# Patient Record
Sex: FEMALE | Race: WHITE | Employment: FULL TIME | URBAN - METROPOLITAN AREA
[De-identification: names, ages, dates, MRNs, and addresses within clinical notes are randomized per-mention and may not be internally consistent; named-entity substitution may affect disease eponyms.]

---

## 2017-03-09 ENCOUNTER — OFFICE VISIT (OUTPATIENT)
Dept: FAMILY MEDICINE | Facility: CLINIC | Age: 62
End: 2017-03-09
Payer: COMMERCIAL

## 2017-03-09 VITALS
WEIGHT: 169.31 LBS | OXYGEN SATURATION: 97 % | SYSTOLIC BLOOD PRESSURE: 146 MMHG | HEART RATE: 97 BPM | DIASTOLIC BLOOD PRESSURE: 90 MMHG | TEMPERATURE: 97.5 F | HEIGHT: 64 IN | BODY MASS INDEX: 28.91 KG/M2

## 2017-03-09 DIAGNOSIS — B86 SCABIES: Primary | ICD-10-CM

## 2017-03-09 PROCEDURE — 99213 OFFICE O/P EST LOW 20 MIN: CPT | Performed by: PHYSICIAN ASSISTANT

## 2017-03-09 RX ORDER — TRIAMCINOLONE ACETONIDE 1 MG/G
CREAM TOPICAL
Qty: 80 G | Refills: 0 | Status: SHIPPED | OUTPATIENT
Start: 2017-03-09 | End: 2017-05-09

## 2017-03-09 RX ORDER — PERMETHRIN 50 MG/G
CREAM TOPICAL
Qty: 60 G | Refills: 1 | Status: SHIPPED | OUTPATIENT
Start: 2017-03-09 | End: 2017-05-09

## 2017-03-09 NOTE — MR AVS SNAPSHOT
After Visit Summary   3/9/2017    Jacqueline Banegas    MRN: [id number]           Patient Information     Date Of Birth          1955        Visit Information        Provider Department      3/9/2017 11:40 AM Klaudia Chow PA-C [facility]        Today's Diagnoses     Screen for colon cancer    -  1    Scabies          Care Instructions    Permethrin cream apply chin to toes and leave on overnight  Wash off in the morning  Reapply in 1 week  Wash all clothing and bedding that was used during infection  Continue allegra and Benadryl for hives  Scabies  Scabies is an infection caused by mites that burrow into the skin. The mites, called Sarcoptes scabiei, are very tiny. They cause severe itching. Though children are most commonly infected, anyone can get scabies. Scabies mites can pass from person to person through close physical contact. They can also be passed through shared clothing, towels, and bedding. Scabies infection is not usually dangerous, but it is uncomfortable. Because it is so contagious, scabies should be treated immediately to keep the infection from spreading.     Scabies bites are often found in body creases.      Symptoms  Symptoms of scabies appear about 4 weeks to 6 weeks after infection in a child or adult who has never had scabies before. A child or adult who has been infected before will experience symptoms much sooner, in 1 day to 4 days. Signs of scabies infection may include:    Intense itching, especially at night or after a hot bath.    Skin irritations that look like hives, insect bites, pimples, or blisters, especially on warmer areas of the body (such as between the fingers, in the armpits, and in the creases of the wrists, elbows, and knees).    Sores on the body caused by scratching (the sores may become infected).    [place] created by mites traveling under the skin, which look like lines on the skin s surface.  Treating Scabies  Infection  Scabies infections are usually treated with a prescription lotion that kills the mites. The lotion must be applied to the entire body from the neck down (including the palms of the hands, soles of the feet, groin, and under the fingernails). The lotion must be left on for 8 hours to 14 hours. In some cases, a second application of lotion is needed a week after the first. Medications work quickly, but most children and adults continue to have an itchy rash for several weeks after treatment. Marks on the skin from scabies usually go away in a week or 2, but sometimes take a few months to clear.  Preventing Spread of the Infection  To prevent reinfection and the spread of scabies to others, follow these instructions:    Treatment of all household members who may have been exposed to scabies may be necessary, whether they show symptoms or not. Talk with your doctor.    Wash the infected person s clothing, towels, bed linens, cloth toys, and other personal items in very hot, soapy water. Dry them thoroughly. Do not share among family members.     Seal items that can t be washed in plastic bags for 2 weeks.    Vacuum floors and furniture. Throw the vacuum bag away afterward.    Notify an infected child s school and caregivers so that other children can be checked and treated.    Keep an infected child home from day care or school until the morning after treatment for scabies.    Warn children not to share items such as clothing and towels with other children.    DO NOT spray your house with chemicals or pesticides. These can be dangerous to your family s health.     When to Call the Doctor  Call your doctor if:    The infected person has a fever, red streaks, pain, or swelling of the skin.    Sores get worse or do not heal.    New rashes appear or itching continues for more than 2 weeks after treatment.        2213-7592 [employer]. [address] [place] [address], [place] [address]. All rights reserved.  "This information is not intended as a substitute for professional medical care. Always follow your healthcare professional's instructions.              Follow-ups after your visit        Who to contact     If you have questions or need follow up information about today's clinic visit or your schedule please contact [facility] directly at [phone].  Normal or non-critical lab and imaging results will be communicated to you by MyChart, letter or phone within 4 business days after the clinic has received the results. If you do not hear from us within 7 days, please contact the clinic through [employer]ourage[employer] or phone. If you have a critical or abnormal lab result, we will notify you by phone as soon as possible.  Submit refill requests through [employer] or call your pharmacy and they will forward the refill request to us. Please allow 3 business days for your refill to be completed.          Additional Information About Your Visit        MyChart Information     [employer] gives you secure access to your electronic health record. If you see a primary care provider, you can also send messages to your care team and make appointments. If you have questions, please call your primary care clinic.  If you do not have a primary care provider, please call [phone] and they will assist you.        Care EveryWhere ID     This is your Care EveryWhere ID. This could be used by other organizations to access your [facility] medical records  [id number]        Your Vitals Were     Pulse Temperature Height Pulse Oximetry BMI (Body Mass Index)       97 97.5  F (36.4  C) (Oral) 5' 4\" (1.626 m) 97% 29.06 kg/m2        Blood Pressure from Last 3 Encounters:   03/09/17 146/90   11/10/16 126/86   10/31/16 136/88    Weight from Last 3 Encounters:   03/09/17 169 lb 5 oz (76.8 kg)   11/10/16 170 lb (77.1 kg)   10/31/16 170 lb 12.8 oz (77.5 kg)              Today, you had the following     No orders found for display         Today's " Medication Changes          These changes are accurate as of: 3/9/17 12:06 PM.  If you have any questions, ask your nurse or doctor.               Start taking these medicines.        Dose/Directions    permethrin 5 % cream   Commonly known as:  ELIMITE   Used for:  Scabies   Started by:  Klaudia Chow PA-C        Apply cream from head to toe (except the face); leave on for 8-14 hours then wash off with water; reapply in 1 week   Quantity:  60 g   Refills:  1            Where to get your medicines      These medications were sent to [facility] - [place], [place] - [address]  [address], [facility] [address]     Phone:  [phone]     permethrin 5 % cream                Primary Care Provider Office Phone # Fax #    Venessa Carter [phone]       [facility] [address] [place] [address]        Thank you!     Thank you for choosing [facility]  for your care. Our goal is always to provide you with excellent care. Hearing back from our patients is one way we can continue to improve our services. Please take a few minutes to complete the written survey that you may receive in the mail after your visit with us. Thank you!             Your Updated Medication List - Protect others around you: Learn how to safely use, store and throw away your medicines at www.disposemymeds.org.          This list is accurate as of: 3/9/17 12:06 PM.  Always use your most recent med list.                   Brand Name Dispense Instructions for use    ALLEGRA PO      Take by mouth as needed for allergies       aspirin 81 MG tablet      Take 1 tablet (81 mg) by mouth daily       permethrin 5 % cream    ELIMITE    60 g    Apply cream from head to toe (except the face); leave on for 8-14 hours then wash off with water; reapply in 1 week       simvastatin 40 MG tablet    ZOCOR    90 tablet    Take 1 tablet by mouth daily        SUMAtriptan 50 MG tablet    IMITREX    9 tablet    Take 1-2 tablets ( mg) by mouth at onset of headache for migraine May repeat in 2 hours if needed: max 2/day       SYNTHROID 112 MCG tablet   Generic drug:  levothyroxine     90 tablet    Take 1 tablet by mouth daily

## 2017-03-09 NOTE — PROGRESS NOTES
SUBJECTIVE:                                                    Jacqueline Banegas is a 62 year old female who presents to clinic today for the following health issues:      Rash     Onset: 8-9 months , 1 day ago got worse     Description:   Location: all over body  Character: red, blotchy  Itching (Pruritis): YES    Progression of Symptoms:  worsening    Accompanying Signs & Symptoms:  Fever: no   Body aches or joint pain: no   Sore throat symptoms: no   Recent cold symptoms: YES     grandkids had scabies and stayed at house last week and were sleeping in the same bed with the patient.    History:   Previous similar rash: YES    Precipitating factors:   Exposure to similar rash: YES  New exposures: medication cassia rivero    Recent travel: no     Alleviating factors:      Has seen an allergist for the hives      Therapies Tried and outcome: allegra , cassia seltzer           Problem list and histories reviewed & adjusted, as indicated.  Additional history: as documented    Patient Active Problem List   Diagnosis     Hyperlipidemia with target LDL less than 130     Migraines     History of basal cell carcinoma     PFO (patent foramen ovale)     Advanced directives, counseling/discussion     Acquired hypothyroidism     Past Surgical History   Procedure Laterality Date     C  delivery only  ,,      Appendectomy       C shoulder surg proc unlisted       RT     C laminectomy,>2 sgmt,cervical       Colonoscopy  2011     Procedure:COLONOSCOPY; Colonoscopy, screening; Surgeon:AUDREY SPENCER; Location:[facility] OR     Biopsy       Skin Ca.       Social History   Substance Use Topics     Smoking status: Former Smoker     Packs/day: 0.25     Years: 10.00     Types: Cigarettes     Quit date: 1990     Smokeless tobacco: Never Used     Alcohol use No     Family History   Problem Relation Age of Onset     Cancer - colorectal Mother 65     HEART DISEASE Mother      IHSS     Hypertension Mother   "    Colon Cancer Mother      CANCER Father      skin     Blood Disease Father      pernicious anemia     Hypertension Father      Hyperlipidemia Father      Asthma Daughter      CANCER Paternal Aunt      uterine     DIABETES Other      cousin, type I     DIABETES Other          Current Outpatient Prescriptions   Medication Sig Dispense Refill     permethrin (ELIMITE) 5 % cream Apply cream from head to toe (except the face); leave on for 8-14 hours then wash off with water; reapply in 1 week 60 g 1     Fexofenadine HCl (ALLEGRA PO) Take by mouth as needed for allergies       simvastatin (ZOCOR) 40 MG tablet Take 1 tablet by mouth daily 90 tablet 3     SYNTHROID 112 MCG tablet Take 1 tablet by mouth daily 90 tablet 3     aspirin 81 MG tablet Take 1 tablet (81 mg) by mouth daily       SUMAtriptan (IMITREX) 50 MG tablet Take 1-2 tablets ( mg) by mouth at onset of headache for migraine May repeat in 2 hours if needed: max 2/day (Patient not taking: Reported on 3/9/2017) 9 tablet 11     Allergies   Allergen Reactions     Erythromycin Hives     palpitations     Penicillins Rash     palpitations     Peanuts [Nuts] Itching and Rash       Reviewed and updated as needed this visit by clinical staff  Tobacco  Allergies  Meds  Med Hx  Surg Hx  Fam Hx  Soc Hx      Reviewed and updated as needed this visit by Provider         ROS:  Constitutional, HEENT, cardiovascular, pulmonary, gi and gu systems are negative, except as otherwise noted.    OBJECTIVE:                                                    /90  Pulse 97  Temp 97.5  F (36.4  C) (Oral)  Ht 5' 4\" (1.626 m)  Wt 169 lb 5 oz (76.8 kg)  SpO2 97%  BMI 29.06 kg/m2  Body mass index is 29.06 kg/(m^2).  GENERAL: healthy, alert and no distress  SKIN: multiple wheals , burrows and excoriation marks on the waist  And in the groin     Diagnostic Test Results:  none      ASSESSMENT/PLAN:                                                        ICD-10-CM    1. Scabies " B86 permethrin (ELIMITE) 5 % cream     Permethrin cream apply chin to toes and leave on overnight  Wash off in the morning  Reapply in 1 week  Wash all clothing and bedding that was used during infection  Continue allegra and Benadryl for hives  Triamcinolone cream three times a day for the  itching      Klaudia Chow PA-C  [facility]

## 2017-03-09 NOTE — PATIENT INSTRUCTIONS
Permethrin cream apply chin to toes and leave on overnight  Wash off in the morning  Reapply in 1 week  Wash all clothing and bedding that was used during infection  Continue allegra and Benadryl for hives  Scabies  Scabies is an infection caused by mites that burrow into the skin. The mites, called Sarcoptes scabiei, are very tiny. They cause severe itching. Though children are most commonly infected, anyone can get scabies. Scabies mites can pass from person to person through close physical contact. They can also be passed through shared clothing, towels, and bedding. Scabies infection is not usually dangerous, but it is uncomfortable. Because it is so contagious, scabies should be treated immediately to keep the infection from spreading.     Scabies bites are often found in body creases.      Symptoms  Symptoms of scabies appear about 4 weeks to 6 weeks after infection in a child or adult who has never had scabies before. A child or adult who has been infected before will experience symptoms much sooner, in 1 day to 4 days. Signs of scabies infection may include:    Intense itching, especially at night or after a hot bath.    Skin irritations that look like hives, insect bites, pimples, or blisters, especially on warmer areas of the body (such as between the fingers, in the armpits, and in the creases of the wrists, elbows, and knees).    Sores on the body caused by scratching (the sores may become infected).    [place] created by mites traveling under the skin, which look like lines on the skin s surface.  Treating Scabies Infection  Scabies infections are usually treated with a prescription lotion that kills the mites. The lotion must be applied to the entire body from the neck down (including the palms of the hands, soles of the feet, groin, and under the fingernails). The lotion must be left on for 8 hours to 14 hours. In some cases, a second application of lotion is needed a week after the first. Medications  work quickly, but most children and adults continue to have an itchy rash for several weeks after treatment. Marks on the skin from scabies usually go away in a week or 2, but sometimes take a few months to clear.  Preventing Spread of the Infection  To prevent reinfection and the spread of scabies to others, follow these instructions:    Treatment of all household members who may have been exposed to scabies may be necessary, whether they show symptoms or not. Talk with your doctor.    Wash the infected person s clothing, towels, bed linens, cloth toys, and other personal items in very hot, soapy water. Dry them thoroughly. Do not share among family members.     Seal items that can t be washed in plastic bags for 2 weeks.    Vacuum floors and furniture. Throw the vacuum bag away afterward.    Notify an infected child s school and caregivers so that other children can be checked and treated.    Keep an infected child home from day care or school until the morning after treatment for scabies.    Warn children not to share items such as clothing and towels with other children.    DO NOT spray your house with chemicals or pesticides. These can be dangerous to your family s health.     When to Call the Doctor  Call your doctor if:    The infected person has a fever, red streaks, pain, or swelling of the skin.    Sores get worse or do not heal.    New rashes appear or itching continues for more than 2 weeks after treatment.        4548-7510 [employer]. [address] [place] [address]. All rights reserved. This information is not intended as a substitute for professional medical care. Always follow your healthcare professional's instructions.

## 2017-03-09 NOTE — NURSING NOTE
"Chief Complaint   Patient presents with     Derm Problem       Initial /90  Pulse 97  Temp 97.5  F (36.4  C) (Oral)  Ht 5' 4\" (1.626 m)  Wt 169 lb 5 oz (76.8 kg)  SpO2 97%  BMI 29.06 kg/m2 Estimated body mass index is 29.06 kg/(m^2) as calculated from the following:    Height as of this encounter: 5' 4\" (1.626 m).    Weight as of this encounter: 169 lb 5 oz (76.8 kg).  Medication Reconciliation: complete       Nadeen Gotti CMA      "

## 2017-05-09 ENCOUNTER — OFFICE VISIT (OUTPATIENT)
Dept: FAMILY MEDICINE | Facility: CLINIC | Age: 62
End: 2017-05-09
Payer: COMMERCIAL

## 2017-05-09 ENCOUNTER — RADIANT APPOINTMENT (OUTPATIENT)
Dept: GENERAL RADIOLOGY | Facility: CLINIC | Age: 62
End: 2017-05-09
Attending: FAMILY MEDICINE
Payer: COMMERCIAL

## 2017-05-09 VITALS
BODY MASS INDEX: 28.17 KG/M2 | SYSTOLIC BLOOD PRESSURE: 136 MMHG | OXYGEN SATURATION: 94 % | RESPIRATION RATE: 15 BRPM | DIASTOLIC BLOOD PRESSURE: 78 MMHG | TEMPERATURE: 98 F | WEIGHT: 165 LBS | HEART RATE: 104 BPM | HEIGHT: 64 IN

## 2017-05-09 DIAGNOSIS — K13.0 MUCOCELE OF LOWER LIP: ICD-10-CM

## 2017-05-09 DIAGNOSIS — M25.561 RIGHT KNEE PAIN, UNSPECIFIED CHRONICITY: Primary | ICD-10-CM

## 2017-05-09 DIAGNOSIS — M17.11 PRIMARY OSTEOARTHRITIS OF RIGHT KNEE: Primary | ICD-10-CM

## 2017-05-09 DIAGNOSIS — M25.561 RIGHT KNEE PAIN, UNSPECIFIED CHRONICITY: ICD-10-CM

## 2017-05-09 PROCEDURE — 73562 X-RAY EXAM OF KNEE 3: CPT | Mod: RT

## 2017-05-09 PROCEDURE — 99213 OFFICE O/P EST LOW 20 MIN: CPT | Performed by: FAMILY MEDICINE

## 2017-05-09 NOTE — MR AVS SNAPSHOT
After Visit Summary   5/9/2017    Jacqueline Banegas    MRN: [id number]           Patient Information     Date Of Birth          1955        Visit Information        Provider Department      5/9/2017 5:40 PM Venessa Crater MD [facility]        Today's Diagnoses     Right knee pain, unspecified chronicity    -  1    Mucocele of lower lip          Care Instructions    [facility]    If you have any questions regarding to your visit please contact your care team:       Team Red:   Clinic Hours Telephone Number   Dr. Venessa Carlin, NP   7am-7pm  Monday - Thursday   7am-5pm  Fridays  [phone]  (Appointment scheduling available 24/7)    Questions about your visit?   Team Line  [phone]   [facility] [place] [facility] 11am-9pm Monday-Friday Saturday-Sunday- 9am-5pm   [place] - 5pm-9pm Monday-Friday Saturday-Sunday- 9am-5pm  [phone] - [facility]  [phone] - [place]       What options do I have for visits at the clinic other than the traditional office visit?  To expand how we care for you, many of our providers are utilizing electronic visits (e-visits) and telephone visits, when medically appropriate, for interactions with their patients rather than a visit in the clinic.   We also offer nurse visits for many medical concerns. Just like any other service, we will bill your insurance company for this type of visit based on time spent on the phone with your provider. Not all insurance companies cover these visits. Please check with your medical insurance if this type of visit is covered. You will be responsible for any charges that are not paid by your insurance.      E-visits via [employer]:  generally incur a $35.00 fee.  Telephone visits:  Time spent on the phone: *charged based on time that is spent on the phone in increments of 10 minutes. Estimated cost:   5-10 mins $30.00   11-20  mins. $59.00   21-30 mins. $85.00     Use [employer] (secure email communication and access to your chart) to send your primary care provider a message or make an appointment. Ask someone on your Team how to sign up for FoodFan.  For a Price Quote for your services, please call our Consumer Price Line at [phone].      As always, Thank you for trusting us with your health care needs!          Follow-ups after your visit        Additional Services     OTOLARYNGOLOGY REFERRAL       Your provider has referred you to: RICHELLE: [facility] [phone]   [email or web address]    Please be aware that coverage of these services is subject to the terms and limitations of your health insurance plan.  Call member services at your health plan with any benefit or coverage questions.      Please bring the following with you to your appointment:    (1) Any X-Rays, CTs or MRIs which have been performed.  Contact the facility where they were done to arrange for  prior to your scheduled appointment.   (2) List of current medications  (3) This referral request   (4) Any documents/labs given to you for this referral                  Follow-up notes from your care team     Return in about 6 months (around 11/9/2017) for physical (fasting labs up to one week prior).      Who to contact     If you have questions or need follow up information about today's clinic visit or your schedule please contact [facility] directly at [phone].  Normal or non-critical lab and imaging results will be communicated to you by [employer]Chiam[employer]art, letter or phone within 4 business days after the clinic has received the results. If you do not hear from us within 7 days, please contact the clinic through [employer] or phone. If you have a critical or abnormal lab result, we will notify you by phone as soon as possible.  Submit refill requests through FoodFan or call your pharmacy and they will forward the  "refill request to us. Please allow 3 business days for your refill to be completed.          Additional Information About Your Visit        [facility] Information     [employer] gives you secure access to your electronic health record. If you see a primary care provider, you can also send messages to your care team and make appointments. If you have questions, please call your primary care clinic.  If you do not have a primary care provider, please call [phone] and they will assist you.        Care EveryWhere ID     This is your Care EveryWhere ID. This could be used by other organizations to access your [facility] medical records  [id number]        Your Vitals Were     Pulse Temperature Respirations Height Pulse Oximetry BMI (Body Mass Index)    104 98  F (36.7  C) 15 5' 4\" (1.626 m) 94% 28.32 kg/m2       Blood Pressure from Last 3 Encounters:   05/09/17 136/78   03/09/17 146/90   11/10/16 126/86    Weight from Last 3 Encounters:   05/09/17 165 lb (74.8 kg)   03/09/17 169 lb 5 oz (76.8 kg)   11/10/16 170 lb (77.1 kg)              We Performed the Following     OTOLARYNGOLOGY REFERRAL        Primary Care Provider Office Phone # Fax #    Venessa Carter [phone]       [address] [address]        Thank you!     Thank you for choosing [facility]  for your care. Our goal is always to provide you with excellent care. Hearing back from our patients is one way we can continue to improve our services. Please take a few minutes to complete the written survey that you may receive in the mail after your visit with us. Thank you!             Your Updated Medication List - Protect others around you: Learn how to safely use, store and throw away your medicines at www.disposemymeds.org.          This list is accurate as of: 5/9/17  6:33 PM.  Always use your most recent med list.                   Brand Name Dispense Instructions for use    ALLEGRA PO      " Take by mouth as needed for allergies Reported on 5/9/2017       aspirin 81 MG tablet      Take 81 mg by mouth daily Reported on 5/9/2017       simvastatin 40 MG tablet    ZOCOR    90 tablet    Take 1 tablet by mouth daily       SUMAtriptan 50 MG tablet    IMITREX    9 tablet    Take 1-2 tablets ( mg) by mouth at onset of headache for migraine May repeat in 2 hours if needed: max 2/day       SYNTHROID 112 MCG tablet   Generic drug:  levothyroxine     90 tablet    Take 1 tablet by mouth daily

## 2017-05-09 NOTE — NURSING NOTE
"Chief Complaint   Patient presents with     Lesion     left side bottom of lip [id number]     Knee Pain     right knee pain and achy/ movement x2 month       Initial /78  Pulse 104  Temp 98  F (36.7  C)  Resp 15  Ht 5' 4\" (1.626 m)  Wt 165 lb (74.8 kg)  SpO2 94%  BMI 28.32 kg/m2 Estimated body mass index is 28.32 kg/(m^2) as calculated from the following:    Height as of this encounter: 5' 4\" (1.626 m).    Weight as of this encounter: 165 lb (74.8 kg).  Medication Reconciliation: complete     Andreia Griffith. MA      "

## 2017-05-09 NOTE — PROGRESS NOTES
"  SUBJECTIVE:                                                    Jacqueline Banegas is a 62 year old female who presents to clinic today for the following health issues:    Joint Pain     Onset: 2-3 months    Description:   Location: right knee  Character: Sharp    Intensity: moderate    Progression of Symptoms: same    Accompanying Signs & Symptoms:  Other symptoms: radiation of pain to goes in the back of calf and swelling   History:   Previous similar pain: no       Precipitating factors:   Trauma or overuse: YES    Alleviating factors:  Improved by: stretching and ibprofen       Therapies Tried and outcome: walking, ibuprofen    Symptoms began after prolonged walking. No instabilty or locking. Symptoms are mild and improving.     ROS:  C: NEGATIVE for fever, chills, change in weight  ENT/MOUTH: soft lesion on lower lip   MUSCULOSKELETAL: right knee pain   N: NEGATIVE for weakness, dizziness or paresthesias    OBJECTIVE:                                                    /78  Pulse 104  Temp 98  F (36.7  C)  Resp 15  Ht 5' 4\" (1.626 m)  Wt 165 lb (74.8 kg)  SpO2 94%  BMI 28.32 kg/m2  Body mass index is 28.32 kg/(m^2).  GENERAL: healthy, alert and no distress  HENT: pink symmetric papule on lower lip   MS: no gross musculoskeletal defects noted, no edema  PSYCH: mentation appears normal, affect normal/bright    Diagnostic Test Results:  Xray pending      ASSESSMENT/PLAN:                                                    (M25.561) Right knee pain, unspecified chronicity  (primary encounter diagnosis)  Comment: suspect MMT   Plan: XR Knee Right 3 Views        Over the counter pain medication as needed, ice as she finds helpful, avoidance of aggravating activities, and return for persistence for consideration of further imaging or referral.     (K13.79) Mucocele of lower lip  Plan: OTOLARYNGOLOGY REFERRAL             See Patient Instructions    Venessa Carter MD  [facility]    "

## 2017-05-09 NOTE — PATIENT INSTRUCTIONS
[facility]    If you have any questions regarding to your visit please contact your care team:       Team Red:   Clinic Hours Telephone Number   Dr. Venessa Carlin, NP   7am-7pm  Monday - Thursday   7am-5pm  Fridays  [phone]  (Appointment scheduling available 24/7)    Questions about your visit?   Team Line  [phone]   [facility] [place] [facility] 11am-9pm Monday-Friday Saturday-Sunday- 9am-5pm   [place] - 5pm-9pm Monday-Friday Saturday-Sunday- 9am-5pm  [phone] - [place]   [phone] - [place]       What options do I have for visits at the clinic other than the traditional office visit?  To expand how we care for you, many of our providers are utilizing electronic visits (e-visits) and telephone visits, when medically appropriate, for interactions with their patients rather than a visit in the clinic.   We also offer nurse visits for many medical concerns. Just like any other service, we will bill your insurance company for this type of visit based on time spent on the phone with your provider. Not all insurance companies cover these visits. Please check with your medical insurance if this type of visit is covered. You will be responsible for any charges that are not paid by your insurance.      E-visits via [employer]:  generally incur a $35.00 fee.  Telephone visits:  Time spent on the phone: *charged based on time that is spent on the phone in increments of 10 minutes. Estimated cost:   5-10 mins $30.00   11-20 mins. $59.00   21-30 mins. $85.00     Use [employer] (secure email communication and access to your chart) to send your primary care provider a message or make an appointment. Ask someone on your Team how to sign up for [employer].  For a Price Quote for your services, please call our Consumer Price Line at [phone].      As always, Thank you for trusting us with your health care needs!

## 2017-05-10 NOTE — PROGRESS NOTES
Jacqueline,    If your pain persists, you can see our sports medicine provider to further evaluate a possible soft tissue injury. I have referred you to:  FM: [place] [facility]  [phone]   [email or web address]    Venessa Carter MD

## 2017-05-27 ENCOUNTER — HEALTH MAINTENANCE LETTER (OUTPATIENT)
Age: 62
End: 2017-05-27

## 2017-07-11 ENCOUNTER — OFFICE VISIT (OUTPATIENT)
Dept: ORTHOPEDICS | Facility: CLINIC | Age: 62
End: 2017-07-11
Payer: COMMERCIAL

## 2017-07-11 VITALS
DIASTOLIC BLOOD PRESSURE: 80 MMHG | SYSTOLIC BLOOD PRESSURE: 122 MMHG | BODY MASS INDEX: 26.33 KG/M2 | WEIGHT: 158 LBS | HEIGHT: 65 IN

## 2017-07-11 DIAGNOSIS — M17.11 PRIMARY OSTEOARTHRITIS OF RIGHT KNEE: Primary | ICD-10-CM

## 2017-07-11 PROCEDURE — 20610 DRAIN/INJ JOINT/BURSA W/O US: CPT | Mod: RT | Performed by: FAMILY MEDICINE

## 2017-07-11 PROCEDURE — 99203 OFFICE O/P NEW LOW 30 MIN: CPT | Mod: 25 | Performed by: FAMILY MEDICINE

## 2017-07-11 RX ORDER — LIDOCAINE HYDROCHLORIDE 10 MG/ML
4 INJECTION, SOLUTION INFILTRATION; PERINEURAL ONCE
Qty: 4 ML | Refills: 0 | OUTPATIENT
Start: 2017-07-11 | End: 2017-07-11

## 2017-07-11 RX ORDER — METHYLPREDNISOLONE ACETATE 40 MG/ML
40 INJECTION, SUSPENSION INTRA-ARTICULAR; INTRALESIONAL; INTRAMUSCULAR; SOFT TISSUE ONCE
Qty: 1 ML | Refills: 0 | OUTPATIENT
Start: 2017-07-11 | End: 2017-07-11

## 2017-07-11 NOTE — PROGRESS NOTES
[facility]   Clinic Visit s 2017    PCP: Venessa Carter      Jacqueline is a 62 year old female who is seen in consultation at the request of Dr. Carter  for   Chief Complaint   Patient presents with     Knee Pain     right       Injury: Reports insidious onset without acute precipitating event.    Location of Pain: right medial posterior knee  Duration of Pain: 4 month(s)  Rating of Pain at worst: 8/10  Rating of Pain Currently: 0/10  Pain is worse with: sitting, driving, standing for too long, extension  Pain is better with: flexion, ice  Treatment so far consists of: ice and ibuprofen  Associated symptoms: swelling and burning sensations, and feelings its may give out  Prior History of related problems: none    Social History: works at clinical director at [employer]     Past Medical History:   Diagnosis Date     Adenomatous goiter      Allergic rhinitis      Basal cell carcinoma ,     scalp, chest, RT upper arm, back     DDD (degenerative disc disease), cervical      History of blood transfusion          Hyperlipidemia LDL goal < 130      Hypothyroidism      Migraine headaches      PFO (patent foramen ovale) 2010     Right knee DJD      Tubular adenoma 2005     Urticaria        Patient Active Problem List    Diagnosis Date Noted     Migraines      Priority: High     Hyperlipidemia with target LDL less than 130      Priority: High             Right knee DJD      Priority: Medium     Acquired hypothyroidism 10/31/2016     Priority: Medium     PFO (patent foramen ovale) 2010     Priority: Medium     Advanced directives, counseling/discussion 2011     Priority: Low     Advanced directive materials discussed and given to patient.         History of basal cell carcinoma 2010     Priority: Low       Family History   Problem Relation Age of Onset     Cancer - colorectal Mother 65     HEART DISEASE Mother      IHSS     Hypertension Mother   "    Colon Cancer Mother      CANCER Father      skin     Blood Disease Father      pernicious anemia     Hypertension Father      Hyperlipidemia Father      Asthma Daughter      CANCER Paternal Aunt      uterine     DIABETES Other      cousin, type I     DIABETES Other        Social History     Social History     Marital status:      Spouse name: N/A     Number of children: 4     Years of education: 16     Occupational History     RN, clinical director [employer]     Social History Main Topics     Smoking status: Former Smoker     Packs/day: 0.25     Years: 10.00     Types: Cigarettes     Quit date: 1990     Smokeless tobacco: Never Used     Alcohol use No       Past Surgical History:   Procedure Laterality Date     APPENDECTOMY  1967     BIOPSY      Skin Ca.     C  DELIVERY ONLY  ,,      C LAMINECTOMY,>2 SGMT,CERVICAL       C SHOULDER SURG PROC UNLISTED      RT     COLONOSCOPY  2011    Procedure:COLONOSCOPY; Colonoscopy, screening; Surgeon:AUDREY SPENCER; Location:[facility] OR       Review of Systems   Musculoskeletal: Positive for joint pain.   All other systems reviewed and are negative.        Physical Exam   Musculoskeletal:        Right knee: Medial joint line tenderness noted.     /80  Ht 5' 4.5\" (1.638 m)  Wt 158 lb (71.7 kg)  BMI 26.7 kg/m2  Constitutional:well-developed, well-nourished, and in no distress.   Cardiovascular: Intact distal pulses.    Neurological: alert. Gait Abnormal:   Antalgic gait  mild limp  Skin: Skin is warm and dry.   Psychiatric: Mood and affect normal.   Respiratory: unlabored, speaks in full sentences  Lymph: no LAD, no lymphangitis      Left Knee Exam   Swelling: None  Effusion: No    Tenderness   None    Range of Motion   Extension: Normal  Flexion:     Normal    Tests   McMurrays:  Medial - Negative      Lateral - Negative  Lachman:  Anterior - Negative    Posterior - n/t  Drawer:       Anterior - Negative     " Posterior - Negative  Varus:  Negative  Valgus: Negative  Pivot Shift: n/t  Patellar Apprehension: No    Comments:  Mild crepitus with ROM    Right Knee Exam   Swelling: Moderate  Effusion: Yes    Tenderness   The patient is experiencing tenderness in the medial joint line.    Range of Motion   Extension: Normal  Flexion:     140    Tests   McMurrays:  Medial - Positive        Lachman:  Anterior - Negative      Drawer:       Anterior - Negative    Posterior - Negative  Varus:  Negative  Valgus: Negative  Pivot Shift: n/t  Patellar Apprehension: No            X-ray images Ordered and independently reviewed by me in the office today with the patient. X-ray shows:mild djd      KNEE RIGHT THREE VIEWS   5/9/2017 6:42 PM      HISTORY: Pain in right knee.     COMPARISON: None.     FINDINGS: Mild hypertrophic changes right knee including  patellofemoral articulation. No fracture. Nothing clearly acute.         IMPRESSION: Mild degenerative changes right knee.     SAMANTHA CRESPO MD    ASSESSMENT/PLAN    ICD-10-CM    1. Primary osteoarthritis of right knee M17.11 DRAIN/INJECT LARGE JOINT/BURSA     METHYLPREDNISOLONE 40 MG INJ     methylPREDNISolone acetate (DEPO-MEDROL) 40 MG/ML injection     lidocaine 1 % injection     Reviewed nature of degenerative arthritis, discussed options including joint protection strategies and symptom management, including NSAIDS,  acetaminophen on a scheduled and/or as needed basis, joint injection with cortisone or hyaluronic acid derivatives, bracing, glucosamine, maintenance of overall knee stability through strengthening through physical therapy.  Pt opts for  symptom treatment and injection therapy    The benefits, risks, indications for and alternatives to the procedure were discussed with the patient, including bleeding, infection, hyperglycemia, localized lipodystrophy and hypopigmentation. She elected to proceed, and informed consent was signed.    PROCEDURE:  JOINT INJECTION.          After a discussion of risks, benefits and side effects of procedure, informed patient consent was obtained, and form signed by patient and physician.       The right  knee was prepped with Chloroprep.    INJECTION:  Using 4 cc of 1% lidocaine mixed                           with 40 mg of DepoMedrol, the right knee joint was successfully injected                           without complication using a 22G needle with the patient lying supine and the injection administered using the superolateral or lateral patellar approach.  She tolerated the procedure well with minimal discomfort. Bandaid applied. Instructed to monitor for increased warmth, redness, pain or swelling which might indicate an infection.

## 2017-07-11 NOTE — MR AVS SNAPSHOT
"              After Visit Summary   7/11/2017    Jacqueline Banegas    MRN: [id number]           Patient Information     Date Of Birth          1955        Visit Information        Provider Department      7/11/2017 12:00 PM Damaso Mejia MD [place] [facility]        Today's Diagnoses     Primary osteoarthritis of right knee    -  1       Follow-ups after your visit        Who to contact     If you have questions or need follow up information about today's clinic visit or your schedule please contact [facility] directly at [phone].  Normal or non-critical lab and imaging results will be communicated to you by [employer], letter or phone within 4 business days after the clinic has received the results. If you do not hear from us within 7 days, please contact the clinic through [employer] or phone. If you have a critical or abnormal lab result, we will notify you by phone as soon as possible.  Submit refill requests through [employer] or call your pharmacy and they will forward the refill request to us. Please allow 3 business days for your refill to be completed.          Additional Information About Your Visit        MyChart Information     [employer] gives you secure access to your electronic health record. If you see a primary care provider, you can also send messages to your care team and make appointments. If you have questions, please call your primary care clinic.  If you do not have a primary care provider, please call [phone] and they will assist you.        Care EveryWhere ID     This is your Care EveryWhere ID. This could be used by other organizations to access your [place] medical records  [id number]        Your Vitals Were     Height BMI (Body Mass Index)                5' 4.5\" (1.638 m) 26.7 kg/m2           Blood Pressure from Last 3 Encounters:   07/11/17 122/80   05/09/17 136/78   03/09/17 146/90    " Weight from Last 3 Encounters:   07/11/17 158 lb (71.7 kg)   05/09/17 165 lb (74.8 kg)   03/09/17 169 lb 5 oz (76.8 kg)              We Performed the Following     DRAIN/INJECT LARGE JOINT/BURSA     METHYLPREDNISOLONE 40 MG INJ          Today's Medication Changes          These changes are accurate as of: 7/11/17  1:13 PM.  If you have any questions, ask your nurse or doctor.               Start taking these medicines.        Dose/Directions    lidocaine 1 % injection   Used for:  Primary osteoarthritis of right knee   Started by:  Damaso Mejia MD        Dose:  4 mL   4 mLs by INTRA-ARTICULAR route once for 1 dose   Quantity:  4 mL   Refills:  0       methylPREDNISolone acetate 40 MG/ML injection   Commonly known as:  DEPO-MEDROL   Used for:  Primary osteoarthritis of right knee   Started by:  Damaso Mejia MD        Dose:  40 mg   1 mL (40 mg) by INTRA-ARTICULAR route once for 1 dose   Quantity:  1 mL   Refills:  0            Where to get your medicines      Some of these will need a paper prescription and others can be bought over the counter.  Ask your nurse if you have questions.     You don't need a prescription for these medications     lidocaine 1 % injection    methylPREDNISolone acetate 40 MG/ML injection                Primary Care Provider Office Phone # Fax #    Venessa Carter [phone]       [address] [address]        Equal Access to Services     [facility] LOVE AH: Hadii jaiden vickers hadasho Soayan, waaxda luqadaha, qaybta kaalmada adeegyada, marco antonio neil. So [facility] [phone].    ATENCIÓN: Si habla español, tiene a [place] disposición servicios gratuitos de asistencia lingüística. Llame al [phone].    We comply with applicable federal civil rights laws and [place] laws. We do not discriminate on the basis of race, color, national origin, age, disability sex, sexual orientation or gender  identity.            Thank you!     Thank you for choosing [place]  for your care. Our goal is always to provide you with excellent care. Hearing back from our patients is one way we can continue to improve our services. Please take a few minutes to complete the written survey that you may receive in the mail after your visit with us. Thank you!             Your Updated Medication List - Protect others around you: Learn how to safely use, store and throw away your medicines at www.disposemymeds.org.          This list is accurate as of: 7/11/17  1:13 PM.  Always use your most recent med list.                   Brand Name Dispense Instructions for use Diagnosis    ALLEGRA PO      Take by mouth as needed for allergies Reported on 5/9/2017        aspirin 81 MG tablet      Take 81 mg by mouth daily Reported on 5/9/2017        lidocaine 1 % injection     4 mL    4 mLs by INTRA-ARTICULAR route once for 1 dose    Primary osteoarthritis of right knee       methylPREDNISolone acetate 40 MG/ML injection    DEPO-MEDROL    1 mL    1 mL (40 mg) by INTRA-ARTICULAR route once for 1 dose    Primary osteoarthritis of right knee       simvastatin 40 MG tablet    ZOCOR    90 tablet    Take 1 tablet by mouth daily    Hyperlipidemia with target LDL less than 130       SUMAtriptan 50 MG tablet    IMITREX    9 tablet    Take 1-2 tablets ( mg) by mouth at onset of headache for migraine May repeat in 2 hours if needed: max 2/day    Migraine without status migrainosus, not intractable, unspecified migraine type       SYNTHROID 112 MCG tablet   Generic drug:  levothyroxine     90 tablet    Take 1 tablet by mouth daily    Acquired hypothyroidism

## 2017-07-11 NOTE — NURSING NOTE
"Chief Complaint   Patient presents with     Knee Pain     right       Initial /80  Ht 5' 4.5\" (1.638 m)  Wt 158 lb (71.7 kg)  BMI 26.7 kg/m2 Estimated body mass index is 26.7 kg/(m^2) as calculated from the following:    Height as of this encounter: 5' 4.5\" (1.638 m).    Weight as of this encounter: 158 lb (71.7 kg).  Medication Reconciliation: complete    "

## 2017-07-11 NOTE — Clinical Note
Here's an update on your patient, Jacqueline Banegas. Thank you for allowing me at [facility] [place] to be involved in the care of your patient. Please feel free to reach out to me on [employer], Epic Staff Message, or by phone at [phone].   Damaso Mejia MD [facility] [address] , [address] [place] [address] Phone: [phone] Fax: [phone]

## 2017-07-18 ENCOUNTER — TELEPHONE (OUTPATIENT)
Dept: ORTHOPEDICS | Facility: CLINIC | Age: 62
End: 2017-07-18

## 2017-07-18 DIAGNOSIS — M25.561 RECURRENT PAIN OF RIGHT KNEE: Primary | ICD-10-CM

## 2017-07-18 NOTE — TELEPHONE ENCOUNTER
Patient called stating her knee is not feeling any better and would like to pursue action with an MRI.

## 2017-07-18 NOTE — TELEPHONE ENCOUNTER
Confirmed that MRI has been ordered for patient.     She has expressed agreement and understanding to return to clinic within 2 days after her MRI to go over results.

## 2017-07-24 ENCOUNTER — TELEPHONE (OUTPATIENT)
Dept: ORTHOPEDICS | Facility: CLINIC | Age: 62
End: 2017-07-24

## 2017-07-24 NOTE — TELEPHONE ENCOUNTER
Patient called inquiring about results of Right knee MRI.    Plan for results from last OV: follow up in office  Preferred contact number: Home  Patient wants to make sure results are back from [facility] in before she schedules an appointment for follow up.     Please contact patient. (writer does not have acces to [facility] results)    NGA Vargas RN

## 2017-07-25 NOTE — TELEPHONE ENCOUNTER
Left voicemail for patient stating that her MRI results are in.     Dr. Mejia is on vacation this week, so please schedule her for next week if she calls back.

## 2017-08-01 ENCOUNTER — OFFICE VISIT (OUTPATIENT)
Dept: ORTHOPEDICS | Facility: CLINIC | Age: 62
End: 2017-08-01
Payer: COMMERCIAL

## 2017-08-01 DIAGNOSIS — S83.231A COMPLEX TEAR OF MEDIAL MENISCUS OF RIGHT KNEE AS CURRENT INJURY, INITIAL ENCOUNTER: ICD-10-CM

## 2017-08-01 DIAGNOSIS — M17.11 PRIMARY OSTEOARTHRITIS OF RIGHT KNEE: Primary | ICD-10-CM

## 2017-08-01 PROCEDURE — 99213 OFFICE O/P EST LOW 20 MIN: CPT | Performed by: FAMILY MEDICINE

## 2017-08-01 NOTE — MR AVS SNAPSHOT
After Visit Summary   8/1/2017    Jacqueline Banegas    MRN: [id number]           Patient Information     Date Of Birth          1955        Visit Information        Provider Department      8/1/2017 3:40 PM Damaso Mejia MD [place] [facility]        Today's Diagnoses     Primary osteoarthritis of right knee    -  1    Complex tear of medial meniscus of right knee as current injury, initial encounter           Follow-ups after your visit        Additional Services     ORTHO  REFERRAL       [facility] is referring you to the Orthopedic  Services at [facility] and Orthopedic [facility].       The  Representative will assist you in the coordination of your Orthopedic and Musculoskeletal Care as prescribed by your physician.    The  Representative will call you within 1 business day to help schedule your appointment, or you may contact the  Representative at:    All areas ~ [phone]     Type of Referral : Surgical / Specialist  - Critical access hospital ortho surg, or [facility] Ortho surg/first available      Timeframe requested: Routine    Coverage of these services is subject to the terms and limitations of your health insurance plan.  Please call member services at your health plan with any benefit or coverage questions.      If X-rays, CT or MRI's have been performed, please contact the facility where they were done to arrange for , prior to your scheduled appointment.  Please bring this referral request to your appointment and present it to your specialist.                  Who to contact     If you have questions or need follow up information about today's clinic visit or your schedule please contact [place] [facility] directly at [phone].  Normal or non-critical lab and imaging results will be communicated to you by MyChart, letter or  phone within 4 business days after the clinic has received the results. If you do not hear from us within 7 days, please contact the clinic through [employer] or phone. If you have a critical or abnormal lab result, we will notify you by phone as soon as possible.  Submit refill requests through [employer] or call your pharmacy and they will forward the refill request to us. Please allow 3 business days for your refill to be completed.          Additional Information About Your Visit        [email or web address] Information     [employer] gives you secure access to your electronic health record. If you see a primary care provider, you can also send messages to your care team and make appointments. If you have questions, please call your primary care clinic.  If you do not have a primary care provider, please call [phone] and they will assist you.        Care EveryWhere ID     This is your Care EveryWhere ID. This could be used by other organizations to access your [place] medical records  [id number]         Blood Pressure from Last 3 Encounters:   07/11/17 122/80   05/09/17 136/78   03/09/17 146/90    Weight from Last 3 Encounters:   07/11/17 158 lb (71.7 kg)   05/09/17 165 lb (74.8 kg)   03/09/17 169 lb 5 oz (76.8 kg)              We Performed the Following     ORTHO  REFERRAL        Primary Care Provider Office Phone # Fax #    Venessa Carter [phone]       [address] [address]        Equal Access to Services     MAX ALEMAN : Hadii aad ku hadasho Soomaali, waaxda luqadaha, qaybta kaalmada adeegyada, marco antonio ortez . So [facility] [phone].    ATENCIÓN: Si habla mel, tiene a [place] disposición servicios gratuitos de asistencia lingüística. Llame al [phone].    We comply with applicable federal civil rights laws and [place] laws. We do not discriminate on the basis of race, color, national origin, age, disability sex, sexual  orientation or gender identity.            Thank you!     Thank you for choosing [place] [facility]  for your care. Our goal is always to provide you with excellent care. Hearing back from our patients is one way we can continue to improve our services. Please take a few minutes to complete the written survey that you may receive in the mail after your visit with us. Thank you!             Your Updated Medication List - Protect others around you: Learn how to safely use, store and throw away your medicines at www.disposemymeds.org.          This list is accurate as of: 8/1/17  4:22 PM.  Always use your most recent med list.                   Brand Name Dispense Instructions for use Diagnosis    ALLEGRA PO      Take by mouth as needed for allergies Reported on 5/9/2017        aspirin 81 MG tablet      Take 81 mg by mouth daily Reported on 5/9/2017        simvastatin 40 MG tablet    ZOCOR    90 tablet    Take 1 tablet by mouth daily    Hyperlipidemia with target LDL less than 130       SUMAtriptan 50 MG tablet    IMITREX    9 tablet    Take 1-2 tablets ( mg) by mouth at onset of headache for migraine May repeat in 2 hours if needed: max 2/day    Migraine without status migrainosus, not intractable, unspecified migraine type       SYNTHROID 112 MCG tablet   Generic drug:  levothyroxine     90 tablet    Take 1 tablet by mouth daily    Acquired hypothyroidism

## 2017-08-01 NOTE — PROGRESS NOTES
[facility]   Follow-up Visit s Aug 1, 2017    PCP: Venessa Carter      Subjective:  Jacqueline is a 62 year old female who is seen in follow up for evaluation of   Chief Complaint   Patient presents with     Knee Pain     right     Her last visit was on 2017.  Since that time, symptoms have been slightly improved. Patient had great relief from injection that lasted 5 days. After she began to notice pain in her knee again. Patient reports she has good and bad days of pain in her knee.     Patient's past medical, surgical, social and family histories are reviewed today.    Jacqueline Banegas is alone today     History from previous visit on 2017    Location of Pain: right medial posterior knee  Duration of Pain: 4 month(s)  Rating of Pain at worst: 8/10  Rating of Pain Currently: 0/10  Pain is worse with: sitting, driving, standing for too long, extension  Pain is better with: flexion, ice  Treatment so far consists of: ice and ibuprofen  Associated symptoms: swelling and burning sensations, and feelings its may give out  Prior History of related problems: none    Social History: works at clinical director at [employer]     Past Medical History:   Diagnosis Date     Adenomatous goiter      Allergic rhinitis      Basal cell carcinoma ,     scalp, chest, RT upper arm, back     DDD (degenerative disc disease), cervical      History of blood transfusion          Hyperlipidemia LDL goal < 130      Hypothyroidism      Migraine headaches      PFO (patent foramen ovale) 2010     Right knee DJD      Tubular adenoma 2005     Urticaria        Patient Active Problem List    Diagnosis Date Noted     Migraines      Priority: High     Hyperlipidemia with target LDL less than 130      Priority: High             Right knee DJD      Priority: Medium     Acquired hypothyroidism 10/31/2016     Priority: Medium     PFO (patent foramen ovale) 2010     Priority: Medium      Advanced directives, counseling/discussion 2011     Priority: Low     Advanced directive materials discussed and given to patient.         History of basal cell carcinoma 2010     Priority: Low       Family History   Problem Relation Age of Onset     Cancer - colorectal Mother 65     HEART DISEASE Mother      IHSS     Hypertension Mother      Colon Cancer Mother      CANCER Father      skin     Blood Disease Father      pernicious anemia     Hypertension Father      Hyperlipidemia Father      Asthma Daughter      CANCER Paternal Aunt      uterine     DIABETES Other      cousin, type I     DIABETES Other        Social History     Social History     Marital status:      Spouse name: N/A     Number of children: 4     Years of education: 16     Occupational History     RN, clinical director [employer]     Social History Main Topics     Smoking status: Former Smoker     Packs/day: 0.25     Years: 10.00     Types: Cigarettes     Quit date: 1990     Smokeless tobacco: Never Used     Alcohol use No       Past Surgical History:   Procedure Laterality Date     APPENDECTOMY       BIOPSY      Skin Ca.     C  DELIVERY ONLY  ,,      C LAMINECTOMY,>2 SGMT,CERVICAL       C SHOULDER SURG PROC UNLISTED      RT     COLONOSCOPY  2011    Procedure:COLONOSCOPY; Colonoscopy, screening; Surgeon:AUDREY SPENCER; Location:[facility] OR       Review of Systems   Musculoskeletal: Positive for joint pain.   All other systems reviewed and are negative.        Physical Exam   Musculoskeletal:        Right knee: Medial joint line tenderness noted.     There were no vitals taken for this visit.  Constitutional:well-developed, well-nourished, and in no distress.   Cardiovascular: Intact distal pulses.    Neurological: alert. Gait Normal:   Gait, station, stance, and balance appear normal for age  Skin: Skin is warm and dry.   Psychiatric: Mood and affect normal.   Respiratory:  unlabored, speaks in full sentences  Lymph: no LAD, no lymphangitis      Right Knee Exam   Swelling: Moderate  Effusion: Yes    Tenderness   The patient is experiencing tenderness in the medial joint line.    Range of Motion   Extension: Normal  Flexion:     140    Tests   McMurrays:  Medial - Positive        Lachman:  Anterior - Negative      Drawer:       Anterior - Negative    Posterior - Negative  Varus:  Negative  Valgus: Negative  Pivot Shift: n/t  Patellar Apprehension: No            X-ray images Ordered and independently reviewed by me in the office today with the patient. X-ray shows:mild djd    Clinical History: Recurrent pain of right knee.     Technique: Multiplanar T1 and T2 weighted sequences of the right knee   without contrast.     Comparison: Radiographs from 5/9/2017     Findings:     Menisci: Complex tear of the posterior horn of the medial meniscus   with predominant radial component. Moderate peripheral extrusion.   Small peripheral undersurface tearing extends to the posterior body.   No displaced meniscal flap or root detachment. Intact lateral   meniscus.      Cruciate Ligaments: The anterior and posterior cruciate ligaments are   well visualized and are intact.      Collateral Ligaments And Posterolateral Corner: The medial collateral   ligament is well visualized and is intact. The fibular collateral   ligament, biceps femoris tendon, popliteus muscle and tendon, and the   popliteomeniscal fascicles are intact. No evidence for posterolateral   corner injury.     Patellofemoral Joint: No evidence for patellar subluxation or   tilting. The retinaculum and extensor mechanism are intact.      Knee Joint: Small joint effusion. Large popliteal cyst with dependent   debris and slight leakage. No intraarticular body.     Bone/Articular Cartilage: No evidence for fracture or contusion.   High-grade cartilage thinning and fissuring throughout the patella   with subchondral marrow edema along the  lateral facet. Full-thickness   cartilage fissuring at the central and medial trochlea.   Full-thickness cartilage loss along the central and peripheral thirds   of the weightbearing medial femoral condyle, and inferior aspect of   the nonweightbearing medial femoral condyle. Low-grade cartilage   thinning along the periphery of the medial tibial plateau.   Full-thickness cartilage defect along the central third of the   weightbearing lateral femoral condyle measuring 7 x 5 mm, with   low-grade cartilage signal changes of the adjacent lateral tibial   plateau. Medial and lateral compartment marginal bony spurring.     Soft Tissues: Negative.     Impression:   1.  Complex tear of the posterior horn of the medial meniscus with   predominant radial component and moderate peripheral extrusion.  2.  High-grade tricompartmental chondromalacia most pronounced in the   medial compartment.  3.  Small joint effusion. Large popliteal cyst with dependent debris   and slight leakage.  4.  Intact cruciate ligaments, collateral ligaments, and lateral   meniscus.  Signed by: Franklin Ulloa    Signed on: Jul- 09:56  KNEE RIGHT THREE VIEWS   5/9/2017 6:42 PM      HISTORY: Pain in right knee.     COMPARISON: None.     FINDINGS: Mild hypertrophic changes right knee including  patellofemoral articulation. No fracture. Nothing clearly acute.         IMPRESSION: Mild degenerative changes right knee.     SAMANTHA CRESPO MD    ASSESSMENT/PLAN    ICD-10-CM    1. Primary osteoarthritis of right knee M17.11    2. Complex tear of medial meniscus of right knee as current injury, initial encounter S83.231A ORTHO  REFERRAL     Overlapping findings of DJD but also complex meniscus tear, which correlates with symptoms, with failure to achieve significant relief after cortisone steroid injection prompting recommendation for ortho surge eval; pt agrees.     Time spent in one-on-one evalution and discussion with an established patient  of this clinic.  regarding nature of problem, course, prior treatments, and therapeutic options, at least 50% of which was spent in counseling and coordination of care: 15 minutes.

## 2017-08-04 ENCOUNTER — OFFICE VISIT (OUTPATIENT)
Dept: ORTHOPEDICS | Facility: CLINIC | Age: 62
End: 2017-08-04
Payer: COMMERCIAL

## 2017-08-04 VITALS — RESPIRATION RATE: 16 BRPM

## 2017-08-04 DIAGNOSIS — M71.21 POPLITEAL CYST, RIGHT: ICD-10-CM

## 2017-08-04 DIAGNOSIS — M25.561 CHRONIC PAIN OF RIGHT KNEE: ICD-10-CM

## 2017-08-04 DIAGNOSIS — S83.231A COMPLEX TEAR OF MEDIAL MENISCUS OF RIGHT KNEE, UNSPECIFIED WHETHER OLD OR CURRENT TEAR, INITIAL ENCOUNTER: ICD-10-CM

## 2017-08-04 DIAGNOSIS — G89.29 CHRONIC PAIN OF RIGHT KNEE: ICD-10-CM

## 2017-08-04 DIAGNOSIS — M17.11 PRIMARY OSTEOARTHRITIS OF RIGHT KNEE: Primary | ICD-10-CM

## 2017-08-04 PROCEDURE — 99203 OFFICE O/P NEW LOW 30 MIN: CPT | Performed by: ORTHOPAEDIC SURGERY

## 2017-08-04 ASSESSMENT — PAIN SCALES - GENERAL: PAINLEVEL: MODERATE PAIN (4)

## 2017-08-04 NOTE — MR AVS SNAPSHOT
After Visit Summary   8/4/2017    Jacqueline Banegas    MRN: [id number]           Patient Information     Date Of Birth          1955        Visit Information        Provider Department      8/4/2017 8:15 AM Mathew Waggoner MD [facility]        Today's Diagnoses     Primary osteoarthritis of right knee    -  1    Complex tear of medial meniscus of right knee, unspecified whether old or current tear, initial encounter        Popliteal cyst, right        Chronic pain of right knee           Follow-ups after your visit        Additional Services     ORTHO  REFERRAL       [facility] is referring you to the Orthopedic  Services at [facility].       The  Representative will assist you in the coordination of your Orthopedic and Musculoskeletal Care as prescribed by your physician.    The  Representative will call you within 1 business day to help schedule your appointment, or you may contact the  Representative at:    All areas ~ [phone]     Type of Referral : Non Surgical , Dr. Olvera for u/s guided right popliteal cyst aspiration      Timeframe requested: 1 - 2 days    Coverage of these services is subject to the terms and limitations of your health insurance plan.  Please call member services at your health plan with any benefit or coverage questions.      If X-rays, CT or MRI's have been performed, please contact the facility where they were done to arrange for , prior to your scheduled appointment.  Please bring this referral request to your appointment and present it to your specialist.                  Follow-up notes from your care team     Return if symptoms worsen or fail to improve.      Who to contact     If you have questions or need follow up information about today's clinic visit or your schedule please contact [facility] directly at [phone].  Normal or  non-critical lab and imaging results will be communicated to you by MyChart, letter or phone within 4 business days after the clinic has received the results. If you do not hear from us within 7 days, please contact the clinic through [employer] or phone. If you have a critical or abnormal lab result, we will notify you by phone as soon as possible.  Submit refill requests through [employer] or call your pharmacy and they will forward the refill request to us. Please allow 3 business days for your refill to be completed.          Additional Information About Your Visit        [employer] Information     [employer] gives you secure access to your electronic health record. If you see a primary care provider, you can also send messages to your care team and make appointments. If you have questions, please call your primary care clinic.  If you do not have a primary care provider, please call [phone] and they will assist you.        Care EveryWhere ID     This is your Care EveryWhere ID. This could be used by other organizations to access your [facility] medical records  [id number]        Your Vitals Were     Respirations                   16            Blood Pressure from Last 3 Encounters:   07/11/17 122/80   05/09/17 136/78   03/09/17 146/90    Weight from Last 3 Encounters:   07/11/17 158 lb (71.7 kg)   05/09/17 165 lb (74.8 kg)   03/09/17 169 lb 5 oz (76.8 kg)              We Performed the Following     ORTHO  REFERRAL        Primary Care Provider Office Phone # Fax #    Venessa Carter [phone]       [address] [address]        Equal Access to Services     [facility]: Hadii aad ku hadasho Soomaali, waaxda luqadaha, qaybta kaalmada adeegyada, marco antonio peters hayalmas ortez . So [facility] [phone].    ATENCIÓN: Si habla español, tiene a [place] disposición servicios gratuitos de asistencia lingüística. Llame al [phone].    We comply with  applicable federal civil rights laws and [place] laws. We do not discriminate on the basis of race, color, national origin, age, disability sex, sexual orientation or gender identity.            Thank you!     Thank you for choosing [facility]  for your care. Our goal is always to provide you with excellent care. Hearing back from our patients is one way we can continue to improve our services. Please take a few minutes to complete the written survey that you may receive in the mail after your visit with us. Thank you!             Your Updated Medication List - Protect others around you: Learn how to safely use, store and throw away your medicines at www.disposemymeds.org.          This list is accurate as of: 8/4/17 11:59 PM.  Always use your most recent med list.                   Brand Name Dispense Instructions for use Diagnosis    ALLEGRA PO      Take by mouth as needed for allergies Reported on 5/9/2017        aspirin 81 MG tablet      Take 81 mg by mouth daily Reported on 5/9/2017        simvastatin 40 MG tablet    ZOCOR    90 tablet    Take 1 tablet by mouth daily    Hyperlipidemia with target LDL less than 130       SUMAtriptan 50 MG tablet    IMITREX    9 tablet    Take 1-2 tablets ( mg) by mouth at onset of headache for migraine May repeat in 2 hours if needed: max 2/day    Migraine without status migrainosus, not intractable, unspecified migraine type       SYNTHROID 112 MCG tablet   Generic drug:  levothyroxine     90 tablet    Take 1 tablet by mouth daily    Acquired hypothyroidism

## 2017-08-04 NOTE — LETTER
8/4/2017         RE: Jacqueline Banegas  [address]  [place] [address]        Dear Colleague,    Thank you for referring your patient, Jacqueline Banegas, to the [facility]. Please see a copy of my visit note below.    CHIEF COMPLAINT:   Chief Complaint   Patient presents with     Knee Pain     Right knee pain. Onset: 3/2017, NKI. Pain is posterior, medial. Had a cortisone injx with Dr. Mejia that helped for 5 days.    .    Jacqueline Banegas is seen today in the [facility] for evaluation of right knee pain at the request of Dr. Damaso Mejia.     HISTORY OF PRESENT ILLNESS    Jacqueline Banegas is a 62 year old female seen for evaluation of ongoing right knee pain with no known injury. Pain has been present for over 4 months, since March 2017 with no known injury. Prior to March, declines having had knee issues. Patient had a cortisone injection on 7/24/2017 with Dr. Mejia, and was treating this as osteoarthritis. She notes the injection lasted 5 days and worked great, then started noticing knee pain again. Today, her pain is moderate, rated a 4/10. Her pain is located over the medial aspect as well as the posterior aspect. Pain is described as constant at some times, intermittent at other times. Sharp at times, a burning sensation at times. She does not have pain at rest, however does have discomfort. Pain is aggravated with leg extension, weightbearing, and walking up and down stairs. With these activities, pain is inconsistent, hurting sometimes and pain-free other times. Knee does have a tendency to swell up. She is working out regularly, with no high-impact actvities. Treatment she has tried: Extra Strength Tylenol and ice. She has not done any physical therapy. She does have pain at rest, notably when extending the knee.    History of low back issues for years.    Present symptoms: moderate pain, medial and posterior right knee.  No locking, catching or giving  way.  Pain severity: 4/10  Frequency of symptoms: frequently  Exacerbating Factors: weight bearing, stairs, leg extension  Relieving Factors: rest, with ice, with Tylenol  Night Pain: No  Pain while at rest: No, discomfort yes.  Numbness or tingling: No   Patient has tried:     NSAIDS: Yes      Physical Therapy: No      Activity modification: Yes      Bracing: No      Injections: Yes, 7/24/2017 cortisone with Dr. Mejia.     Ice: Yes      Assistive device:  No     Other: none    Orthopaedic PMH: none    Other PMH:  has a past medical history of Adenomatous goiter; Allergic rhinitis; Basal cell carcinoma (1997, 2004); DDD (degenerative disc disease), cervical; History of blood transfusion (1984); Hyperlipidemia LDL goal < 130; Hypothyroidism; Migraine headaches; PFO (patent foramen ovale) (12/28/2010); Right knee DJD; Tubular adenoma (06/16/2005); and Urticaria. She also has no past medical history of Acne; Actinic keratosis; Allergies; Cerebral infarction (H); Congestive heart failure, unspecified; COPD (chronic obstructive pulmonary disease) (H); Coronary artery disease; CVA (cerebral infarction); Depressive disorder; Diabetes (H); Eczema; Heart valve disorder; Hypertension; Malignant melanoma nos; Pacemaker; Photosensitive contact dermatitis; Psoriasis; Squamous cell carcinoma; Type II or unspecified type diabetes mellitus without mention of complication, not stated as uncontrolled; Uncomplicated asthma; Unspecified asthma(493.90); or Urticaria.  Patient Active Problem List    Diagnosis Date Noted     Migraines      Priority: High     Hyperlipidemia with target LDL less than 130      Priority: High             Right knee DJD      Priority: Medium     Acquired hypothyroidism 10/31/2016     Priority: Medium     PFO (patent foramen ovale) 12/28/2010     Priority: Medium     Advanced directives, counseling/discussion 08/22/2011     Priority: Low     Advanced directive materials discussed and given to patient.          History of basal cell carcinoma 2010     Priority: Low       Surgical Hx:  has a past surgical history that includes  DELIVERY ONLY (,, ); appendectomy (); SHOULDER SURG PROC UNLISTED (); LAMINECTOMY,>2 SGMT,CERVICAL (); Colonoscopy (2011); and biopsy.    Medications:   Current Outpatient Prescriptions:      Fexofenadine HCl (ALLEGRA PO), Take by mouth as needed for allergies Reported on 2017, Disp: , Rfl:      simvastatin (ZOCOR) 40 MG tablet, Take 1 tablet by mouth daily, Disp: 90 tablet, Rfl: 3     SYNTHROID 112 MCG tablet, Take 1 tablet by mouth daily, Disp: 90 tablet, Rfl: 3     SUMAtriptan (IMITREX) 50 MG tablet, Take 1-2 tablets ( mg) by mouth at onset of headache for migraine May repeat in 2 hours if needed: max 2/day (Patient not taking: Reported on 2017), Disp: 9 tablet, Rfl: 11     aspirin 81 MG tablet, Take 81 mg by mouth daily Reported on 2017, Disp: , Rfl:     Allergies:   Allergies   Allergen Reactions     Erythromycin Hives     palpitations     Penicillins Rash     palpitations       Social Hx: nurse with [employer]   reports that she quit smoking about 27 years ago. Her smoking use included Cigarettes. She has a 2.50 pack-year smoking history. She has never used smokeless tobacco. She reports that she does not drink alcohol or use illicit drugs.    Family Hx: family history includes Asthma in her daughter; Blood Disease in her father; CANCER in her father and paternal aunt; Cancer - colorectal (age of onset: 65) in her mother; Colon Cancer in her mother; DIABETES in some other family members; HEART DISEASE in her mother; Hyperlipidemia in her father; Hypertension in her father and mother.    REVIEW OF SYSTEMS: 10 point ROS neg other than the symptoms noted above in the HPI and PMH. Notables include  CONSTITUTIONAL:NEGATIVE for fever, chills, change in weight  INTEGUMENTARY/SKIN: NEGATIVE for worrisome rashes, moles or  lesions  MUSCULOSKELETAL:See HPI above  NEURO: NEGATIVE for weakness, dizziness or paresthesias    This document serves as a record of the services and decisions personally performed and made by Mathew Waggoner MD. It was created on his behalf by Sivan Mcneill, a trained medical scribe. The creation of this document is based the provider's statements to the medical scribe.    Scribtosha Mcneill 8:42 AM 8/4/2017    PHYSICAL EXAM:  Resp 16   GENERAL APPEARANCE: healthy, alert, no distress  SKIN: no suspicious lesions or rashes  NEURO: Normal strength and tone, mentation intact and speech normal  PSYCH:  mentation appears normal and affect normal, not anxious  RESPIRATORY: No increased work of breathing.  HANDS: no clubbing, nail pitting.    BILATERAL LOWER EXTREMITIES:  Gait: slight  favoring right  Alignment: varus  No gross deformities or masses.  No Quad atrophy, strength normal.  Intact sensation deep peroneal nerve, superficial peroneal nerve, med/lat tibial nerve, sural nerve, saphenous nerve  Intact EHL, EDL, TA, FHL, GS, quadriceps hamstrings and hip flexors  Toes warm and well perfused, brisk capillary refill. Palpable 2+ dp pulses.  Bilateral calf soft and nttp or squeeze.  No palpable popliteal lymphadenopathy.  DTRs: achilles 2+, patella 2+.  Edema: none  Hips with full, pain-free motion. No irritability with flexion, adduction, and internal rotation.    LEFT KNEE EXAM:    Skin: intact, no ecchymosis or erythema  ROM: 0 extension to 135 flexion  Tight hamstrings on straight leg raise.  Effusion: none; mild posterior fullness  Tender: pes anserine bursa  NTTP med/lat joint line, anterior or posterior knee  McMurrays: negative    MCL: stable, and non-painful at both 0 and 30 degrees knee flexion  Varus stress: stable, and non-painful at both 0 and 30 degrees knee flexion  Lachmans: neg, firm endpoint  Posterior Drawer stable  Patellofemoral joint:                Apprehension: negative              Crepitations:  mild   Grind: positive    RIGHT KNEE EXAM:    Skin: intact, no ecchymosis or erythema  Squat: 50 %, not limited by pain. Mild burning sensation.  ROM: 1 extension to 115-120 flexion with medial discomfort  Tight hamstrings on straight leg raise.  Effusion: small effusion; posterior fullness.  Tender: medial joint line, pes anserine bursa  NTTP lat joint line, anterior or posterior knee  McMurrays: negative    MCL: stable, and non-painful at both 0 and 30 degrees knee flexion  Varus stress: stable, and non-painful at both 0 and 30 degrees knee flexion  Lachmans: neg, firm endpoint  Posterior Drawer stable  Patellofemoral joint:                Apprehension: negative              Crepitations: mild   Grind: positive    X-RAY:  3 views right knee from 5/9/2017 were reviewed in clinic today. On my review, no obvious fractures or dislocations. Mild tricompartmental osteophytes present. mild narrowing of the medial joint space and patellofemoral joint space.    MRI:  MRI right knee from 6/21/2017 was reviewed in clinic today.    Impression:   1.  Complex tear of the posterior horn of the medial meniscus with   predominant radial component and moderate peripheral extrusion.  2.  High-grade tricompartmental chondromalacia most pronounced in the   medial compartment.  High-grade cartilage thinning and fissuring throughout the patella   with subchondral marrow edema along the lateral facet. Full-thickness   cartilage fissuring at the central and medial trochlea.   Full-thickness cartilage loss along the central and peripheral thirds   of the weightbearing medial femoral condyle, and inferior aspect of   the nonweightbearing medial femoral condyle. Low-grade cartilage   thinning along the periphery of the medial tibial plateau.   Full-thickness cartilage defect along the central third of the   weightbearing lateral femoral condyle measuring 7 x 5 mm, with   low-grade cartilage signal changes of the adjacent lateral tibial  "  plateau. Medial and lateral compartment marginal bony spurring.  3.  Small joint effusion. Large popliteal cyst with dependent debris   and slight leakage.  4.  Intact cruciate ligaments, collateral ligaments, and lateral   meniscus.      Impression:   62 year old female with right knee pain, osteoarthritis, medial meniscus tear, popliteal cyst.    Plan:  OSTEOARTHRITIS:  * reviewed imaging studies with patient, showing arthritis. Arthritis is wearing of the cartilage due to longstanding \"wear and tear\" or can follow an injury to the joint.  * the severity of arthritis on MRI would suggest worse arthritis than the xrays show.  * I don't know how much relief of symptoms she'd get with an arthroscopy and debridement given the amount of arthritis.  * in the setting of underlying chondrosis, predictability of arthroscopy is uncertain, unless mechanical symptoms present due to the meniscus tear. Arthroscopy surgery is not intended nor expected to alleviate arthritic pain symptoms, nor will it treat or correct underlying arthritic changes. Arthritis and symptoms related to arthritis could worsen with arthroscopy and meniscal and/or chondral debridement.      Discussed various treatments for degenerative arthrosis of the knee, including nonoperative and operative approaches. Nonoperative approaches, which are exhausted prior to operative treatment, include doing nothing and living with the pain as patient has been doing, activity modification (avoid aggravating activities), physical therapy and strengthening exercises, weight loss, anti-inflammatory medications, bracing, ambulatory aids (cane, walker) and injections. Once these have been tried and are deemed unsuccessful with a good effort, and the patient is an appropriate candidate, the next treatment would be knee arthroplasty or replacement. Depending on the location of the arthritis, knee replacement can be partial (one side of the knee affected by arthritis) or a " total knee arthroplasty (all 3 compartments). The risks, perceived benefits and perioperative rehabilitation expectations of knee arthroplasty were discussed in detail. Also discussed that approximately 10% of patients that undergo knee arthroplasty are not happy following surgery and may have worse pain or no improvement in pain, contrary to their preoperative expectations.      * Rest  * Activity modification - avoid activities that aggravate symptoms.  * NSAIDS - regular use for inflammation, with food, as long as no contra-indications. Please discuss with pcp if needed.  * Ice twice daily to three times daily.  * Compression wrap  * Elevation of extremity to reduce swelling  * PT ordered for strengthening, stretching and range of motion exercises  * Tylenol as needed for pain    * discussed referring for an image-guided popliteal cyst aspiration. Patient elects to proceed. Will refer to Dr. Olvera for U/S guided popliteal cyst aspiration/injection.    * return to clinic as needed.        The information in this document, created by a scribe for me, accurately reflects the services I personally performed and the decisions made by me. I have reviewed and approved this document for accuracy.      Mathew Waggoner M.D., M.S.  Dept. of Orthopaedic Surgery  [facility]    Again, thank you for allowing me to participate in the care of your patient.        Sincerely,        Mathew Waggoner MD

## 2017-08-04 NOTE — PROGRESS NOTES
CHIEF COMPLAINT:   Chief Complaint   Patient presents with     Knee Pain     Right knee pain. Onset: 3/2017, NKI. Pain is posterior, medial. Had a cortisone injx with Dr. Mejia that helped for 5 days.    .    Jacqueline Banegas is seen today in the [facility] for evaluation of right knee pain at the request of Dr. Damaso Mejia.     HISTORY OF PRESENT ILLNESS    Jacqueline Banegas is a 62 year old female seen for evaluation of ongoing right knee pain with no known injury. Pain has been present for over 4 months, since March 2017 with no known injury. Prior to March, declines having had knee issues. Patient had a cortisone injection on 7/24/2017 with Dr. Mejia, and was treating this as osteoarthritis. She notes the injection lasted 5 days and worked great, then started noticing knee pain again. Today, her pain is moderate, rated a 4/10. Her pain is located over the medial aspect as well as the posterior aspect. Pain is described as constant at some times, intermittent at other times. Sharp at times, a burning sensation at times. She does not have pain at rest, however does have discomfort. Pain is aggravated with leg extension, weightbearing, and walking up and down stairs. With these activities, pain is inconsistent, hurting sometimes and pain-free other times. Knee does have a tendency to swell up. She is working out regularly, with no high-impact actvities. Treatment she has tried: Extra Strength Tylenol and ice. She has not done any physical therapy. She does have pain at rest, notably when extending the knee.    History of low back issues for years.    Present symptoms: moderate pain, medial and posterior right knee.  No locking, catching or giving way.  Pain severity: 4/10  Frequency of symptoms: frequently  Exacerbating Factors: weight bearing, stairs, leg extension  Relieving Factors: rest, with ice, with Tylenol  Night Pain: No  Pain while at rest: No, discomfort yes.  Numbness or  tingling: No   Patient has tried:     NSAIDS: Yes      Physical Therapy: No      Activity modification: Yes      Bracing: No      Injections: Yes, 2017 cortisone with Dr. Mejia.     Ice: Yes      Assistive device:  No     Other: none    Orthopaedic PMH: none    Other PMH:  has a past medical history of Adenomatous goiter; Allergic rhinitis; Basal cell carcinoma (, ); DDD (degenerative disc disease), cervical; History of blood transfusion (); Hyperlipidemia LDL goal < 130; Hypothyroidism; Migraine headaches; PFO (patent foramen ovale) (2010); Right knee DJD; Tubular adenoma (2005); and Urticaria. She also has no past medical history of Acne; Actinic keratosis; Allergies; Cerebral infarction (H); Congestive heart failure, unspecified; COPD (chronic obstructive pulmonary disease) (H); Coronary artery disease; CVA (cerebral infarction); Depressive disorder; Diabetes (H); Eczema; Heart valve disorder; Hypertension; Malignant melanoma nos; Pacemaker; Photosensitive contact dermatitis; Psoriasis; Squamous cell carcinoma; Type II or unspecified type diabetes mellitus without mention of complication, not stated as uncontrolled; Uncomplicated asthma; Unspecified asthma(493.90); or Urticaria.  Patient Active Problem List    Diagnosis Date Noted     Migraines      Priority: High     Hyperlipidemia with target LDL less than 130      Priority: High             Right knee DJD      Priority: Medium     Acquired hypothyroidism 10/31/2016     Priority: Medium     PFO (patent foramen ovale) 2010     Priority: Medium     Advanced directives, counseling/discussion 2011     Priority: Low     Advanced directive materials discussed and given to patient.         History of basal cell carcinoma 2010     Priority: Low       Surgical Hx:  has a past surgical history that includes  DELIVERY ONLY (,, ); appendectomy (); SHOULDER SURG PROC UNLISTED (); LAMINECTOMY,>2  SGMT,CERVICAL (1995); Colonoscopy (9/22/2011); and biopsy.    Medications:   Current Outpatient Prescriptions:      Fexofenadine HCl (ALLEGRA PO), Take by mouth as needed for allergies Reported on 5/9/2017, Disp: , Rfl:      simvastatin (ZOCOR) 40 MG tablet, Take 1 tablet by mouth daily, Disp: 90 tablet, Rfl: 3     SYNTHROID 112 MCG tablet, Take 1 tablet by mouth daily, Disp: 90 tablet, Rfl: 3     SUMAtriptan (IMITREX) 50 MG tablet, Take 1-2 tablets ( mg) by mouth at onset of headache for migraine May repeat in 2 hours if needed: max 2/day (Patient not taking: Reported on 7/11/2017), Disp: 9 tablet, Rfl: 11     aspirin 81 MG tablet, Take 81 mg by mouth daily Reported on 5/9/2017, Disp: , Rfl:     Allergies:   Allergies   Allergen Reactions     Erythromycin Hives     palpitations     Penicillins Rash     palpitations       Social Hx: nurse with [employer]   reports that she quit smoking about 27 years ago. Her smoking use included Cigarettes. She has a 2.50 pack-year smoking history. She has never used smokeless tobacco. She reports that she does not drink alcohol or use illicit drugs.    Family Hx: family history includes Asthma in her daughter; Blood Disease in her father; CANCER in her father and paternal aunt; Cancer - colorectal (age of onset: 65) in her mother; Colon Cancer in her mother; DIABETES in some other family members; HEART DISEASE in her mother; Hyperlipidemia in her father; Hypertension in her father and mother.    REVIEW OF SYSTEMS: 10 point ROS neg other than the symptoms noted above in the HPI and PMH. Notables include  CONSTITUTIONAL:NEGATIVE for fever, chills, change in weight  INTEGUMENTARY/SKIN: NEGATIVE for worrisome rashes, moles or lesions  MUSCULOSKELETAL:See HPI above  NEURO: NEGATIVE for weakness, dizziness or paresthesias    This document serves as a record of the services and decisions personally performed and made by Mathew Waggoner MD. It was created on his behalf by  Sivan Mcneill, a trained medical scribe. The creation of this document is based the provider's statements to the medical scribe.    Scribtosha Mcneill 8:42 AM 8/4/2017    PHYSICAL EXAM:  Resp 16   GENERAL APPEARANCE: healthy, alert, no distress  SKIN: no suspicious lesions or rashes  NEURO: Normal strength and tone, mentation intact and speech normal  PSYCH:  mentation appears normal and affect normal, not anxious  RESPIRATORY: No increased work of breathing.  HANDS: no clubbing, nail pitting.    BILATERAL LOWER EXTREMITIES:  Gait: slight  favoring right  Alignment: varus  No gross deformities or masses.  No Quad atrophy, strength normal.  Intact sensation deep peroneal nerve, superficial peroneal nerve, med/lat tibial nerve, sural nerve, saphenous nerve  Intact EHL, EDL, TA, FHL, GS, quadriceps hamstrings and hip flexors  Toes warm and well perfused, brisk capillary refill. Palpable 2+ dp pulses.  Bilateral calf soft and nttp or squeeze.  No palpable popliteal lymphadenopathy.  DTRs: achilles 2+, patella 2+.  Edema: none  Hips with full, pain-free motion. No irritability with flexion, adduction, and internal rotation.    LEFT KNEE EXAM:    Skin: intact, no ecchymosis or erythema  ROM: 0 extension to 135 flexion  Tight hamstrings on straight leg raise.  Effusion: none; mild posterior fullness  Tender: pes anserine bursa  NTTP med/lat joint line, anterior or posterior knee  McMurrays: negative    MCL: stable, and non-painful at both 0 and 30 degrees knee flexion  Varus stress: stable, and non-painful at both 0 and 30 degrees knee flexion  Lachmans: neg, firm endpoint  Posterior Drawer stable  Patellofemoral joint:                Apprehension: negative              Crepitations: mild   Grind: positive    RIGHT KNEE EXAM:    Skin: intact, no ecchymosis or erythema  Squat: 50 %, not limited by pain. Mild burning sensation.  ROM: 1 extension to 115-120 flexion with medial discomfort  Tight hamstrings on straight leg  raise.  Effusion: small effusion; posterior fullness.  Tender: medial joint line, pes anserine bursa  NTTP lat joint line, anterior or posterior knee  McMurrays: negative    MCL: stable, and non-painful at both 0 and 30 degrees knee flexion  Varus stress: stable, and non-painful at both 0 and 30 degrees knee flexion  Lachmans: neg, firm endpoint  Posterior Drawer stable  Patellofemoral joint:                Apprehension: negative              Crepitations: mild   Grind: positive    X-RAY:  3 views right knee from 5/9/2017 were reviewed in clinic today. On my review, no obvious fractures or dislocations. Mild tricompartmental osteophytes present. mild narrowing of the medial joint space and patellofemoral joint space.    MRI:  MRI right knee from 6/21/2017 was reviewed in clinic today.    Impression:   1.  Complex tear of the posterior horn of the medial meniscus with   predominant radial component and moderate peripheral extrusion.  2.  High-grade tricompartmental chondromalacia most pronounced in the   medial compartment.  High-grade cartilage thinning and fissuring throughout the patella   with subchondral marrow edema along the lateral facet. Full-thickness   cartilage fissuring at the central and medial trochlea.   Full-thickness cartilage loss along the central and peripheral thirds   of the weightbearing medial femoral condyle, and inferior aspect of   the nonweightbearing medial femoral condyle. Low-grade cartilage   thinning along the periphery of the medial tibial plateau.   Full-thickness cartilage defect along the central third of the   weightbearing lateral femoral condyle measuring 7 x 5 mm, with   low-grade cartilage signal changes of the adjacent lateral tibial   plateau. Medial and lateral compartment marginal bony spurring.  3.  Small joint effusion. Large popliteal cyst with dependent debris   and slight leakage.  4.  Intact cruciate ligaments, collateral ligaments, and lateral  "  meniscus.      Impression:   62 year old female with right knee pain, osteoarthritis, medial meniscus tear, popliteal cyst.    Plan:  OSTEOARTHRITIS:  * reviewed imaging studies with patient, showing arthritis. Arthritis is wearing of the cartilage due to longstanding \"wear and tear\" or can follow an injury to the joint.  * the severity of arthritis on MRI would suggest worse arthritis than the xrays show.  * I don't know how much relief of symptoms she'd get with an arthroscopy and debridement given the amount of arthritis.  * in the setting of underlying chondrosis, predictability of arthroscopy is uncertain, unless mechanical symptoms present due to the meniscus tear. Arthroscopy surgery is not intended nor expected to alleviate arthritic pain symptoms, nor will it treat or correct underlying arthritic changes. Arthritis and symptoms related to arthritis could worsen with arthroscopy and meniscal and/or chondral debridement.      Discussed various treatments for degenerative arthrosis of the knee, including nonoperative and operative approaches. Nonoperative approaches, which are exhausted prior to operative treatment, include doing nothing and living with the pain as patient has been doing, activity modification (avoid aggravating activities), physical therapy and strengthening exercises, weight loss, anti-inflammatory medications, bracing, ambulatory aids (cane, walker) and injections. Once these have been tried and are deemed unsuccessful with a good effort, and the patient is an appropriate candidate, the next treatment would be knee arthroplasty or replacement. Depending on the location of the arthritis, knee replacement can be partial (one side of the knee affected by arthritis) or a total knee arthroplasty (all 3 compartments). The risks, perceived benefits and perioperative rehabilitation expectations of knee arthroplasty were discussed in detail. Also discussed that approximately 10% of patients that " undergo knee arthroplasty are not happy following surgery and may have worse pain or no improvement in pain, contrary to their preoperative expectations.      * Rest  * Activity modification - avoid activities that aggravate symptoms.  * NSAIDS - regular use for inflammation, with food, as long as no contra-indications. Please discuss with pcp if needed.  * Ice twice daily to three times daily.  * Compression wrap  * Elevation of extremity to reduce swelling  * PT ordered for strengthening, stretching and range of motion exercises  * Tylenol as needed for pain    * discussed referring for an image-guided popliteal cyst aspiration. Patient elects to proceed. Will refer to Dr. Olvera for U/S guided popliteal cyst aspiration/injection.    * return to clinic as needed.        The information in this document, created by a scribe for me, accurately reflects the services I personally performed and the decisions made by me. I have reviewed and approved this document for accuracy.      Mathew Waggoner M.D., M.S.  Dept. of Orthopaedic Surgery  [facility]

## 2017-10-17 NOTE — PATIENT INSTRUCTIONS
[facility]    If you have any questions regarding to your visit please contact your care team:       Team Red:   Clinic Hours Telephone Number   Dr. Venessa Cooper  (pediatrics)  Rose Carlin NP 7am-7pm  Monday - Thursday   7am-5pm  Fridays  [phone] (fax)    Kaycee RANDHAWA  [phone]   Urgent Care - [place] and [place] Monday-Friday  [place] - 11am-8pm  Saturday-Sunday  Both sites - 9am-5pm  [phone] - [facility]  [phone] - [place]       What options do I have for visits at the clinic other than the traditional office visit?  To expand how we care for you, many of our providers are utilizing electronic visits (e-visits) and telephone visits, when medically appropriate, for interactions with their patients rather than a visit in the clinic.   We also offer nurse visits for many medical concerns. Just like any other service, we will bill your insurance company for this type of visit based on time spent on the phone with your provider. Not all insurance companies cover these visits. Please check with your medical insurance if this type of visit is covered. You will be responsible for any charges that are not paid by your insurance.      E-visits via [employer]:  generally incur a $35.00 fee.  Telephone visits:  Time spent on the phone: *charged based on time that is spent on the phone in increments of 10 minutes. Estimated cost:   5-10 mins $30.00   11-20 mins. $59.00   21-30 mins. $85.00     As always, Thank you for trusting us with your health care needs!  Ghada BARNETT MA            Preventive Health Recommendations  Female Ages 50 - 64    Yearly exam: See your health care provider every year in order to  o Review health changes.   o Discuss preventive care.    o Review your medicines if your doctor has prescribed any.      Get a Pap test every three years (unless you have an abnormal result and your provider advises testing  more often).    If you get Pap tests with HPV test, you only need to test every 5 years, unless you have an abnormal result.     You do not need a Pap test if your uterus was removed (hysterectomy) and you have not had cancer.    You should be tested each year for STDs (sexually transmitted diseases) if you're at risk.     Have a mammogram every 1 to 2 years.    Have a colonoscopy at age 50, or have a yearly FIT test (stool test). These exams screen for colon cancer.      Have a cholesterol test every 5 years, or more often if advised.    Have a diabetes test (fasting glucose) every three years. If you are at risk for diabetes, you should have this test more often.     If you are at risk for osteoporosis (brittle bone disease), think about having a bone density scan (DEXA).    Shots: Get a flu shot each year. Get a tetanus shot every 10 years.    Nutrition:     Eat at least 5 servings of fruits and vegetables each day.    Eat whole-grain bread, whole-wheat pasta and brown rice instead of white grains and rice.    Talk to your provider about Calcium and Vitamin D.     Lifestyle    Exercise at least 150 minutes a week (30 minutes a day, 5 days a week). This will help you control your weight and prevent disease.    Limit alcohol to one drink per day.    No smoking.     Wear sunscreen to prevent skin cancer.     See your dentist every six months for an exam and cleaning.    See your eye doctor every 1 to 2 years.

## 2017-10-31 ENCOUNTER — OFFICE VISIT (OUTPATIENT)
Dept: FAMILY MEDICINE | Facility: CLINIC | Age: 62
End: 2017-10-31
Payer: COMMERCIAL

## 2017-10-31 VITALS
TEMPERATURE: 98 F | WEIGHT: 159 LBS | BODY MASS INDEX: 26.49 KG/M2 | OXYGEN SATURATION: 99 % | SYSTOLIC BLOOD PRESSURE: 132 MMHG | DIASTOLIC BLOOD PRESSURE: 80 MMHG | HEART RATE: 96 BPM | HEIGHT: 65 IN

## 2017-10-31 DIAGNOSIS — E03.9 ACQUIRED HYPOTHYROIDISM: ICD-10-CM

## 2017-10-31 DIAGNOSIS — Z12.31 VISIT FOR SCREENING MAMMOGRAM: ICD-10-CM

## 2017-10-31 DIAGNOSIS — Z00.00 ROUTINE HISTORY AND PHYSICAL EXAMINATION OF ADULT: Primary | ICD-10-CM

## 2017-10-31 DIAGNOSIS — L98.9 SKIN LESION: ICD-10-CM

## 2017-10-31 DIAGNOSIS — E78.5 HYPERLIPIDEMIA WITH TARGET LDL LESS THAN 130: ICD-10-CM

## 2017-10-31 DIAGNOSIS — Z12.11 SCREEN FOR COLON CANCER: ICD-10-CM

## 2017-10-31 PROCEDURE — 80061 LIPID PANEL: CPT | Performed by: FAMILY MEDICINE

## 2017-10-31 PROCEDURE — 99396 PREV VISIT EST AGE 40-64: CPT | Performed by: FAMILY MEDICINE

## 2017-10-31 PROCEDURE — 84443 ASSAY THYROID STIM HORMONE: CPT | Performed by: FAMILY MEDICINE

## 2017-10-31 PROCEDURE — 36415 COLL VENOUS BLD VENIPUNCTURE: CPT | Performed by: FAMILY MEDICINE

## 2017-10-31 PROCEDURE — 84439 ASSAY OF FREE THYROXINE: CPT | Performed by: FAMILY MEDICINE

## 2017-10-31 RX ORDER — SIMVASTATIN 40 MG
TABLET ORAL
Qty: 90 TABLET | Refills: 3 | Status: SHIPPED | OUTPATIENT
Start: 2017-10-31 | End: 2018-10-31

## 2017-10-31 RX ORDER — LEVOTHYROXINE SODIUM 112 MCG
TABLET ORAL
Qty: 90 TABLET | Refills: 3 | Status: SHIPPED | OUTPATIENT
Start: 2017-10-31 | End: 2018-10-31

## 2017-10-31 NOTE — PROGRESS NOTES
SUBJECTIVE:   CC: Jacqueline Banegas is an 62 year old woman  who presents for preventive health visit.     Patient also presents for follow-up of hypothyroidism, controlled on current medication.  Denies symptoms of hypo- or hyperthyroidism. Hypercholesterolemia well controlled with current treatment plan without side effects.     Physical   Annual:     Getting at least 3 servings of Calcium per day::  Yes    Bi-annual eye exam::  Yes    Dental care twice a year::  NO    Sleep apnea or symptoms of sleep apnea::  None    Diet::  Regular (no restrictions)    Frequency of exercise::  6-7 days/week    Duration of exercise::  45-60 minutes    Taking medications regularly::  Yes    Medication side effects::  None    Additional concerns today::  YES          Right knee    Today's PHQ-2 Score:   PHQ-2 (  [employer]) 10/29/2017   Q1: Little interest or pleasure in doing things 0   Q2: Feeling down, depressed or hopeless 0   PHQ-2 Score 0   Q1: Little interest or pleasure in doing things Not at all   Q2: Feeling down, depressed or hopeless Not at all   PHQ-2 Score 0       Abuse: Current or Past(Physical, Sexual or Emotional)- No  Do you feel safe in your environment - Yes    Social History   Substance Use Topics     Smoking status: Former Smoker     Packs/day: 0.25     Years: 10.00     Types: Cigarettes     Quit date: 1990     Smokeless tobacco: Never Used     Alcohol use No     The patient does not drink >3 drinks per day nor >7 drinks per week.    Reviewed orders with patient.  Reviewed health maintenance and updated orders accordingly - Yes  Patient Active Problem List   Diagnosis     Hyperlipidemia with target LDL less than 130     Migraines     History of basal cell carcinoma     PFO (patent foramen ovale)     Advanced directives, counseling/discussion     Acquired hypothyroidism     Right knee DJD     Past Surgical History:   Procedure Laterality Date     APPENDECTOMY  1967     BIOPSY      Skin Ca.     C   DELIVERY ONLY  ,,      C LAMINECTOMY,>2 SGMT,CERVICAL       C SHOULDER SURG PROC UNLISTED      RT     COLONOSCOPY  2011    Procedure:COLONOSCOPY; Colonoscopy, screening; Surgeon:AUDREY SPENCER; Location: OR       Social History   Substance Use Topics     Smoking status: Former Smoker     Packs/day: 0.25     Years: 10.00     Types: Cigarettes     Quit date: 1990     Smokeless tobacco: Never Used     Alcohol use No     Family History   Problem Relation Age of Onset     Cancer - colorectal Mother 65     HEART DISEASE Mother      IHSS     Hypertension Mother      Colon Cancer Mother      CANCER Father      skin     Blood Disease Father      pernicious anemia     Hypertension Father      Hyperlipidemia Father      Asthma Daughter      CANCER Paternal Aunt      uterine     DIABETES Other      cousin, type I     DIABETES Other          Current Outpatient Prescriptions   Medication Sig Dispense Refill     simvastatin (ZOCOR) 40 MG tablet Take 1 tablet by mouth daily 90 tablet 3     SYNTHROID 112 MCG tablet Take 1 tablet by mouth daily 90 tablet 3     Fexofenadine HCl (ALLEGRA PO) Take by mouth as needed for allergies Reported on 2017       SUMAtriptan (IMITREX) 50 MG tablet Take 1-2 tablets ( mg) by mouth at onset of headache for migraine May repeat in 2 hours if needed: max 2/day 9 tablet 11     aspirin 81 MG tablet Take 81 mg by mouth daily Reported on 2017       [DISCONTINUED] simvastatin (ZOCOR) 40 MG tablet Take 1 tablet by mouth daily 90 tablet 3     [DISCONTINUED] SYNTHROID 112 MCG tablet Take 1 tablet by mouth daily 90 tablet 3     Allergies   Allergen Reactions     Erythromycin Hives     palpitations     Penicillins Rash     palpitations         Patient over age 50, mutual decision to screen reflected in health maintenance.      Pertinent mammograms are reviewed under the imaging tab.  History of abnormal Pap smear: NO - age 30-65 PAP every 5 years with  "negative HPV co-testing recommended    Reviewed and updated as needed this visit by clinical staffTobacco  Allergies  Meds  Med Hx  Surg Hx  Fam Hx         Reviewed and updated as needed this visit by Provider  Meds  Med Hx  Surg Hx  Fam Hx        Past Medical History:   Diagnosis Date     Adenomatous goiter      Allergic rhinitis      Basal cell carcinoma ,     scalp, chest, RT upper arm, back     DDD (degenerative disc disease), cervical      History of blood transfusion          Hyperlipidemia LDL goal < 130      Hypothyroidism      Migraine headaches      PFO (patent foramen ovale) 2010     Right knee DJD      Tubular adenoma 2005     Urticaria         Review of Systems  C: NEGATIVE for fever, chills, change in weight  INTEGUMENTARY/SKIN: new skin lesions   E: NEGATIVE for vision changes or irritation  ENT: NEGATIVE for ear, mouth and throat problems  R: NEGATIVE for significant cough or SOB  B: NEGATIVE for masses, tenderness or discharge  CV: NEGATIVE for chest pain, palpitations or peripheral edema  GI: NEGATIVE for nausea, abdominal pain, heartburn, or change in bowel habits  : NEGATIVE for unusual urinary or vaginal symptoms. No vaginal bleeding.  M: NEGATIVE for significant arthralgias or myalgia  N: NEGATIVE for weakness, dizziness or paresthesias  P: NEGATIVE for changes in mood or affect      OBJECTIVE:   /80  Pulse 96  Temp 98  F (36.7  C) (Oral)  Ht 5' 4.5\" (1.638 m)  Wt 159 lb (72.1 kg)  SpO2 99%  Breastfeeding? No  BMI 26.87 kg/m2  Physical Exam  GENERAL: healthy, alert and no distress  EYES: Eyes grossly normal to inspection, PERRL and conjunctivae and sclerae normal  HENT: ear canals and TM's normal, nose and mouth without ulcers or lesions  NECK: no adenopathy, no asymmetry, masses, or scars and thyroid normal to palpation  RESP: lungs clear to auscultation - no rales, rhonchi or wheezes  BREAST: normal without masses, tenderness or nipple " discharge and no palpable axillary masses or adenopathy  CV: regular rate and rhythm, normal S1 S2, no S3 or S4, no murmur, click or rub, no peripheral edema and peripheral pulses strong  ABDOMEN: soft, nontender, no hepatosplenomegaly, no masses and bowel sounds normal  MS: no gross musculoskeletal defects noted, no edema  SKIN: scattered sun damaged skin with pink papules on back, chest and extremities   NEURO: Normal strength and tone, mentation intact and speech normal  PSYCH: mentation appears normal, affect normal/bright    ASSESSMENT/PLAN:   (Z00.00) Routine history and physical examination of adult  (primary encounter diagnosis)  Plan: GASTROENTEROLOGY ADULT REF PROCEDURE ONLY          (E03.9) Acquired hypothyroidism  Comment: euthyroid on replacement   Plan: TSH WITH FREE T4 REFLEX, SYNTHROID 112 MCG         tablet          (E78.5) Hyperlipidemia with target LDL less than 130  Comment: Well controlled with medications without side effects.   Plan: Lipid panel reflex to direct LDL Fasting,         simvastatin (ZOCOR) 40 MG tablet          (L98.9) Skin lesion  Comment: Differential diagnoses would include: actinic keratoses   Plan: DERMATOLOGY REFERRAL             COUNSELING:  Reviewed preventive health counseling, as reflected in patient instructions  Special attention given to:        Regular exercise       Healthy diet/nutrition       Osteoporosis Prevention/Bone Health       Colon cancer screening       Consider Hep C screening for patients born between 1945 and 1965       The 10-year ASCVD risk score (Bon Wier MONICA Jr, et al., 2013) is: 3.7%    Values used to calculate the score:      Age: 62 years      Sex: Female      Is Non- : No      Diabetic: No      Tobacco smoker: No      Systolic Blood Pressure: 132 mmHg      Is BP treated: No      HDL Cholesterol: 58 mg/dL      Total Cholesterol: 166 mg/dL      BP Screening:   Last 3 BP Readings:    BP Readings from Last 3 Encounters:  "  10/31/17 132/80   07/11/17 122/80   05/09/17 136/78       The following was recommended to the patient:  Re-screen BP within a year and recommended lifestyle modifications   reports that she quit smoking about 27 years ago. Her smoking use included Cigarettes. She has a 2.50 pack-year smoking history. She has never used smokeless tobacco.    Estimated body mass index is 26.87 kg/(m^2) as calculated from the following:    Height as of this encounter: 5' 4.5\" (1.638 m).    Weight as of this encounter: 159 lb (72.1 kg).   Weight management plan: Discussed healthy diet and exercise guidelines and patient will follow up in 12 months in clinic to re-evaluate.    Counseling Resources:  ATP IV Guidelines  Pooled Cohorts Equation Calculator  Breast Cancer Risk Calculator  FRAX Risk Assessment  ICSI Preventive Guidelines  Dietary Guidelines for Americans, 2010  USDA's MyPlate  ASA Prophylaxis  Lung CA Screening    Venessa Carter MD  [facility]  "

## 2017-10-31 NOTE — MR AVS SNAPSHOT
After Visit Summary   10/31/2017    Jacqueline Banegas    MRN: [id number]           Patient Information     Date Of Birth          1955        Visit Information        Provider Department      10/31/2017 4:40 PM Venessa Carter MD [facility]        Today's Diagnoses     Routine history and physical examination of adult    -  1    Acquired hypothyroidism        Hyperlipidemia with target LDL less than 130        Skin lesion        Screen for colon cancer        Visit for screening mammogram          Care Instructions    [facility]    If you have any questions regarding to your visit please contact your care team:       Team Red:   Clinic Hours Telephone Number   Dr. Venessa Cooper  (pediatrics)  Rose Carlin NP 7am-7pm  Monday - Thursday   7am-5pm  Fridays  [phone] (fax)    Kaycee RANDHAWA  [phone]   Urgent Care - [place] and [place] Monday-Friday  [place] - 11am-8pm  Saturday-Sunday  Both sites - 9am-5pm  [phone] - [facility]  [phone] - [place]       What options do I have for visits at the clinic other than the traditional office visit?  To expand how we care for you, many of our providers are utilizing electronic visits (e-visits) and telephone visits, when medically appropriate, for interactions with their patients rather than a visit in the clinic.   We also offer nurse visits for many medical concerns. Just like any other service, we will bill your insurance company for this type of visit based on time spent on the phone with your provider. Not all insurance companies cover these visits. Please check with your medical insurance if this type of visit is covered. You will be responsible for any charges that are not paid by your insurance.      E-visits via [employer]:  generally incur a $35.00 fee.  Telephone visits:  Time spent on the phone: *charged based on time that is spent on the phone  in increments of 10 minutes. Estimated cost:   5-10 mins $30.00   11-20 mins. $59.00   21-30 mins. $85.00     As always, Thank you for trusting us with your health care needs!  Ghada BARNETT MA            Preventive Health Recommendations  Female Ages 50 - 64    Yearly exam: See your health care provider every year in order to  o Review health changes.   o Discuss preventive care.    o Review your medicines if your doctor has prescribed any.      Get a Pap test every three years (unless you have an abnormal result and your provider advises testing more often).    If you get Pap tests with HPV test, you only need to test every 5 years, unless you have an abnormal result.     You do not need a Pap test if your uterus was removed (hysterectomy) and you have not had cancer.    You should be tested each year for STDs (sexually transmitted diseases) if you're at risk.     Have a mammogram every 1 to 2 years.    Have a colonoscopy at age 50, or have a yearly FIT test (stool test). These exams screen for colon cancer.      Have a cholesterol test every 5 years, or more often if advised.    Have a diabetes test (fasting glucose) every three years. If you are at risk for diabetes, you should have this test more often.     If you are at risk for osteoporosis (brittle bone disease), think about having a bone density scan (DEXA).    Shots: Get a flu shot each year. Get a tetanus shot every 10 years.    Nutrition:     Eat at least 5 servings of fruits and vegetables each day.    Eat whole-grain bread, whole-wheat pasta and brown rice instead of white grains and rice.    Talk to your provider about Calcium and Vitamin D.     Lifestyle    Exercise at least 150 minutes a week (30 minutes a day, 5 days a week). This will help you control your weight and prevent disease.    Limit alcohol to one drink per day.    No smoking.     Wear sunscreen to prevent skin cancer.     See your dentist every six months for an exam and cleaning.    See  your eye doctor every 1 to 2 years.            Follow-ups after your visit        Additional Services     DERMATOLOGY REFERRAL       Your provider has referred you to: [facility]: [facility] [phone]   [email or web address]    Please be aware that coverage of these services is subject to the terms and limitations of your health insurance plan.  Call member services at your health plan with any benefit or coverage questions.      Please bring the following with you to your appointment:    (1) Any X-Rays, CTs or MRIs which have been performed.  Contact the facility where they were done to arrange for  prior to your scheduled appointment.    (2) List of current medications  (3) This referral request   (4) Any documents/labs given to you for this referral            GASTROENTEROLOGY ADULT REF PROCEDURE ONLY       Last Lab Result: No results found for: CR  Body mass index is 26.87 kg/(m^2).     Needed:  No  Language:  English    Patient will be contacted to schedule procedure.     Please be aware that coverage of these services is subject to the terms and limitations of your health insurance plan.  Call member services at your health plan with any benefit or coverage questions.  Any procedures must be performed at a [place] facility OR coordinated by your clinic's referral office.    Please bring the following with you to your appointment:    (1) Any X-Rays, CTs or MRIs which have been performed.  Contact the facility where they were done to arrange for  prior to your scheduled appointment.    (2) List of current medications   (3) This referral request   (4) Any documents/labs given to you for this referral                  Future tests that were ordered for you today     Open Future Orders        Priority Expected Expires Ordered    MA SCREENING DIGITAL BILAT - Future  (s+30) Routine  10/17/2018 10/31/2017           "  Who to contact     If you have questions or need follow up information about today's clinic visit or your schedule please contact [facility] directly at [phone].  Normal or non-critical lab and imaging results will be communicated to you by MyChart, letter or phone within 4 business days after the clinic has received the results. If you do not hear from us within 7 days, please contact the clinic through MyChart or phone. If you have a critical or abnormal lab result, we will notify you by phone as soon as possible.  Submit refill requests through [employer] or call your pharmacy and they will forward the refill request to us. Please allow 3 business days for your refill to be completed.          Additional Information About Your Visit        [employer] Information     [employer] gives you secure access to your electronic health record. If you see a primary care provider, you can also send messages to your care team and make appointments. If you have questions, please call your primary care clinic.  If you do not have a primary care provider, please call [phone] and they will assist you.        Care EveryWhere ID     This is your Care EveryWhere ID. This could be used by other organizations to access your [facility] medical records  [id number]        Your Vitals Were     Pulse Temperature Height Pulse Oximetry Breastfeeding? BMI (Body Mass Index)    96 98  F (36.7  C) (Oral) 5' 4.5\" (1.638 m) 99% No 26.87 kg/m2       Blood Pressure from Last 3 Encounters:   10/31/17 132/80   07/11/17 122/80   05/09/17 136/78    Weight from Last 3 Encounters:   10/31/17 159 lb (72.1 kg)   07/11/17 158 lb (71.7 kg)   05/09/17 165 lb (74.8 kg)              We Performed the Following     DERMATOLOGY REFERRAL     GASTROENTEROLOGY ADULT REF PROCEDURE ONLY     Lipid panel reflex to direct LDL Fasting     TSH WITH FREE T4 REFLEX        Primary Care Provider Office Phone # Fax #    Venessa Carter [phone] " [phone]       [address]  [place] [address]        Equal Access to Services     FORTUNATOTYESHA LOVE : Hadii aad ku hadjuan albertoo Sochristianoali, waaxda luqadaha, qaybta kaalmada adegrace, marco antonio radhain hayaaandrea swiftjuliet heck neelima neil. So [facility] [phone].    ATENCIÓN: Si habla español, tiene a [place] disposición servicios gratuitos de asistencia lingüística. Llame al [phone].    We comply with applicable federal civil rights laws and [place] laws. We do not discriminate on the basis of race, color, national origin, age, disability, sex, sexual orientation, or gender identity.            Thank you!     Thank you for choosing [facility]  for your care. Our goal is always to provide you with excellent care. Hearing back from our patients is one way we can continue to improve our services. Please take a few minutes to complete the written survey that you may receive in the mail after your visit with us. Thank you!             Your Updated Medication List - Protect others around you: Learn how to safely use, store and throw away your medicines at www.disposemymeds.org.          This list is accurate as of: 10/31/17  5:59 PM.  Always use your most recent med list.                   Brand Name Dispense Instructions for use Diagnosis    ALLEGRA PO      Take by mouth as needed for allergies Reported on 5/9/2017        aspirin 81 MG tablet      Take 81 mg by mouth daily Reported on 5/9/2017        simvastatin 40 MG tablet    ZOCOR    90 tablet    Take 1 tablet by mouth daily    Hyperlipidemia with target LDL less than 130       SUMAtriptan 50 MG tablet    IMITREX    9 tablet    Take 1-2 tablets ( mg) by mouth at onset of headache for migraine May repeat in 2 hours if needed: max 2/day    Migraine without status migrainosus, not intractable, unspecified migraine type       SYNTHROID 112 MCG tablet   Generic drug:  levothyroxine     90 tablet    Take 1 tablet by mouth daily    Acquired hypothyroidism

## 2017-10-31 NOTE — NURSING NOTE
"Chief Complaint   Patient presents with     Physical       Initial /80  Pulse 96  Temp 98  F (36.7  C) (Oral)  Ht 5' 4.5\" (1.638 m)  Wt 159 lb (72.1 kg)  SpO2 99%  Breastfeeding? No  BMI 26.87 kg/m2 Estimated body mass index is 26.87 kg/(m^2) as calculated from the following:    Height as of this encounter: 5' 4.5\" (1.638 m).    Weight as of this encounter: 159 lb (72.1 kg).  Medication Reconciliation: complete Ghada BARNETT MA      "

## 2017-11-01 LAB
CHOLEST SERPL-MCNC: 171 MG/DL
HDLC SERPL-MCNC: 59 MG/DL
LDLC SERPL CALC-MCNC: 95 MG/DL
NONHDLC SERPL-MCNC: 112 MG/DL
T4 FREE SERPL-MCNC: 0.91 NG/DL (ref 0.76–1.46)
TRIGL SERPL-MCNC: 86 MG/DL
TSH SERPL DL<=0.005 MIU/L-ACNC: 4.85 MU/L (ref 0.4–4)

## 2017-11-01 NOTE — PROGRESS NOTES
Your cholesterol is well controlled. Your thyroid dose is fine. Follow-up yearly.     Venessa Carter MD

## 2017-12-05 ENCOUNTER — HOSPITAL ENCOUNTER (OUTPATIENT)
Facility: AMBULATORY SURGERY CENTER | Age: 62
Discharge: HOME | End: 2017-12-05
Attending: SURGERY | Admitting: SURGERY
Payer: COMMERCIAL

## 2017-12-05 ENCOUNTER — SURGERY (OUTPATIENT)
Age: 62
End: 2017-12-05

## 2017-12-05 VITALS
SYSTOLIC BLOOD PRESSURE: 125 MMHG | OXYGEN SATURATION: 98 % | BODY MASS INDEX: 26.33 KG/M2 | RESPIRATION RATE: 16 BRPM | WEIGHT: 158 LBS | TEMPERATURE: 97.8 F | HEIGHT: 65 IN | DIASTOLIC BLOOD PRESSURE: 90 MMHG

## 2017-12-05 PROCEDURE — 45385 COLONOSCOPY W/LESION REMOVAL: CPT

## 2017-12-05 PROCEDURE — 99152 MOD SED SAME PHYS/QHP 5/>YRS: CPT | Performed by: SURGERY

## 2017-12-05 PROCEDURE — 45381 COLONOSCOPY SUBMUCOUS NJX: CPT | Mod: PT | Performed by: SURGERY

## 2017-12-05 PROCEDURE — G8918 PT W/O PREOP ORDER IV AB PRO: HCPCS

## 2017-12-05 PROCEDURE — 88305 TISSUE EXAM BY PATHOLOGIST: CPT | Performed by: SURGERY

## 2017-12-05 PROCEDURE — 45385 COLONOSCOPY W/LESION REMOVAL: CPT | Mod: PT | Performed by: SURGERY

## 2017-12-05 PROCEDURE — 45381 COLONOSCOPY SUBMUCOUS NJX: CPT

## 2017-12-05 PROCEDURE — G8907 PT DOC NO EVENTS ON DISCHARG: HCPCS

## 2017-12-05 RX ORDER — FENTANYL CITRATE 50 UG/ML
INJECTION, SOLUTION INTRAMUSCULAR; INTRAVENOUS PRN
Status: DISCONTINUED | OUTPATIENT
Start: 2017-12-05 | End: 2017-12-05 | Stop reason: HOSPADM

## 2017-12-05 RX ORDER — ONDANSETRON 2 MG/ML
4 INJECTION INTRAMUSCULAR; INTRAVENOUS
Status: DISCONTINUED | OUTPATIENT
Start: 2017-12-05 | End: 2017-12-06 | Stop reason: HOSPADM

## 2017-12-05 RX ORDER — LIDOCAINE 40 MG/G
CREAM TOPICAL
Status: DISCONTINUED | OUTPATIENT
Start: 2017-12-05 | End: 2017-12-06 | Stop reason: HOSPADM

## 2017-12-05 RX ADMIN — FENTANYL CITRATE 100 MCG: 50 INJECTION, SOLUTION INTRAMUSCULAR; INTRAVENOUS at 09:47

## 2017-12-07 LAB — COPATH REPORT: NORMAL

## 2017-12-08 ENCOUNTER — RADIANT APPOINTMENT (OUTPATIENT)
Dept: MAMMOGRAPHY | Facility: CLINIC | Age: 62
End: 2017-12-08
Payer: COMMERCIAL

## 2017-12-08 DIAGNOSIS — Z12.31 VISIT FOR SCREENING MAMMOGRAM: ICD-10-CM

## 2017-12-08 PROCEDURE — G0202 SCR MAMMO BI INCL CAD: HCPCS | Mod: TC

## 2017-12-12 LAB — COLONOSCOPY: NORMAL

## 2018-06-18 ENCOUNTER — OFFICE VISIT (OUTPATIENT)
Dept: FAMILY MEDICINE | Facility: CLINIC | Age: 63
End: 2018-06-18
Payer: COMMERCIAL

## 2018-06-18 VITALS — HEART RATE: 88 BPM | DIASTOLIC BLOOD PRESSURE: 87 MMHG | SYSTOLIC BLOOD PRESSURE: 146 MMHG | OXYGEN SATURATION: 98 %

## 2018-06-18 DIAGNOSIS — D22.9 MULTIPLE MELANOCYTIC NEVI: ICD-10-CM

## 2018-06-18 DIAGNOSIS — L81.4 SOLAR LENTIGO: ICD-10-CM

## 2018-06-18 DIAGNOSIS — D48.5 NEOPLASM OF UNCERTAIN BEHAVIOR OF SKIN: ICD-10-CM

## 2018-06-18 DIAGNOSIS — Z85.828 HISTORY OF BASAL CELL CARCINOMA: ICD-10-CM

## 2018-06-18 DIAGNOSIS — L57.0 AK (ACTINIC KERATOSIS): ICD-10-CM

## 2018-06-18 DIAGNOSIS — Z86.007 HISTORY OF SQUAMOUS CELL CARCINOMA IN SITU OF SKIN: ICD-10-CM

## 2018-06-18 DIAGNOSIS — Z12.83 SKIN CANCER SCREENING: Primary | ICD-10-CM

## 2018-06-18 PROCEDURE — 17003 DESTRUCT PREMALG LES 2-14: CPT | Performed by: FAMILY MEDICINE

## 2018-06-18 PROCEDURE — 11300 SHAVE SKIN LESION 0.5 CM/<: CPT | Mod: 59 | Performed by: FAMILY MEDICINE

## 2018-06-18 PROCEDURE — 88305 TISSUE EXAM BY PATHOLOGIST: CPT | Mod: TC | Performed by: FAMILY MEDICINE

## 2018-06-18 PROCEDURE — 99213 OFFICE O/P EST LOW 20 MIN: CPT | Mod: 25 | Performed by: FAMILY MEDICINE

## 2018-06-18 PROCEDURE — 11301 SHAVE SKIN LESION 0.6-1.0 CM: CPT | Mod: 59 | Performed by: FAMILY MEDICINE

## 2018-06-18 PROCEDURE — 17000 DESTRUCT PREMALG LESION: CPT | Mod: 51 | Performed by: FAMILY MEDICINE

## 2018-06-18 NOTE — PROGRESS NOTES
[facility] - PRIMARY CARE SKIN    CC : skin cancer screening (full-body)  SUBJECTIVE:                                                    Jacqueline Banegas is a 63 year old female who presents to clinic today for a full-body skin exam because of her history of skin cancer.    Bothersome lesions noticed by the patient or other skin concerns :  Issue One : Lesion on chest.  Onset : 2-3 months  Issue Two : Lesions on both arms.  Onset : 2-3 months  Issue Three : Lesions on lower legs.    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 ? Skin cancers on leg  Family history of skin cancer : YES - non-melanoma skin cancer in father and sister.  Autoimmune : NO    Ancestry : [place], English, [place], [place]     Sun Exposure History  Previous history of significant sun exposure:  Blistering sunburns : YES  Tanning beds : YES - when younger.  Sunscreen Use : YES, SPF : 50.  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    Refer to electronic medical record (EMR) for past medical history and medications.    INTEGUMENTARY/SKIN: POSITIVE for changing lesions  ROS : 14 point review of systems was negative except the symptoms listed above in the HPI.    This document serves as a record of the services and decisions personally performed and made by Katlyn Sultana MD. It was created on her behalf by Nicho Garay, a trained medical scribe.  The creation of this document is based on the scribe's personal observations and the provider's statements to the medical scribe.  Nicho Garay, June 18, 2018 7:46 AM      OBJECTIVE:                                                     GENERAL: healthy, alert and no distress  SKIN: Choudhary Skin Type - I.  This patient was examined from the top of the head to the bottom of the feet  including scalp, face, neck, back, chest, breasts, buttocks, both arms, both legs, both hands, both feet, all 10 fingers and all 10 toes. The dermatoscope was used to help evaluate pigmented lesions.  Skin Pertinent Findings:  Right arm(s) and left arm(s) : Multiple brown, macule(s) most consistent with benign solar lentigo. Multiple brown macules of various sizes and shapes most consistent with (benign) melanocytic nevi.    Abdomen : Multiple brown macules of various sizes and shapes most consistent with (benign) melanocytic nevi.    Back : brown, macule(s) most consistent with benign solar lentigo.              Diffuse actinic damage on the skin  Significant Findings:  Left mid-arm : 5x3 mm in size, scaly, erythematous, indurated lesion. ? Basal cell carcinoma ? Squamous cell carcinoma ? Other      Left lateral mid-upper arm : 5 mm in size, scaly, indurated lesion. ? Basal cell carcinoma ? Squamous cell carcinoma ? Other      Right upper chest, 1 cm inferior to medial clavicle : 5 mm in size, raised, smooth lesion. ? Basal cell carcinoma ? Squamous cell carcinoma ? Other    Chest, midline at T3 : 9 x 2 mm in size, scaly, indurated lesion partially overlying previous excision site. Previous pathology from 7/2016 s/p ED&C indicated basal cell carcinoma. ? Basal cell carcinoma ? Other    Left upper chest, 1 cm inferior to medial clavicle : 2 mm in size, raised coarse-textured, erythematous lesion within previous scar. ? Actinic keratosis ? Recurrent basal cell carcinoma ? Other      Right lower leg, inferior to medial malleolus : 6 x 4 mm in size, scaly erythematous, indurated lesion. ? Actinic keratosis ? Squamous cell carcinoma ? Other. Patient return for shave excision.    Right medial mid-calf, 15 cm superior to medial malleolus : 4 mm  in size, scaly, indurated lesion. ? Actinic keratosis ? Squamous cell carcinoma ? Other. Patient return for shave excision.    Right medial calf, 6 cm superior to medial malleolus : 7 x 5 mm in size, scaly, erythematous, indurated lesion. ? Actinic keratosis ? Squamous cell carcinoma ? Other. Patient return for shave excision.      Mid-back, at T4 : 6 x 4 mm in size, scaly, erythematous lesion. ? Actinic keratosis ? Squamous cell carcinoma ? Other. Patient to return for shave excision.      Right lateral back, mid-posterior axillary line : 6 x 3 mm in size, erythematous, indurated, scaly lesion. ? Actinic keratosis ? Squamous cell carcinoma ? Other. Patient to return for shave excision.    Back, 12 cm right of midline, at T7 : 4 mm in size, erythematous, scaly, indurated lesion. ? Actinic keratosis ? Squamous cell carcinoma ? Other. Patient to return for shave excision.      Left lateral posterior leg, 2 cm inferior to flexor crease : 7 x 5 mm in size, indurated, scaly, erythematous lesion. ? Basal cell carcinoma ? Squamous cell carcinoma ? Other. Patient to return for shave excision.      Arms, Trunk : 3-10 mm in size, erythematous, scaly, non-indurated lesion(s) most consistent with actinic keratoses.  Name : Liquid Nitrogen Cry-Ac Cryotherapy.  Indication : Pre-malignant lesion.  Location(s) : left upper arm - x2, chest midline - x1, left mid-upper breast - x1 .  Completed by : Katlyn Sultana MD  Note : Discussed natural history of lesion and treatment options. Prior to treatment, we discussed inflammation, tenderness post-procedure, the healing process, and the risks of pain, infection, scarring, blistering, and hypo-/hyperpigmentation after healing. Explained that these lesions may grow back and may need additional treatment or re-treatment. The patient expressed a desire to proceed with cryotherapy.    The lesion(s) was treated with liquid nitrogen Cry-Ac, five second freeze repeated twice with a pause to allow  "for the area to thaw. If this lesion should recur, then it needs to be re-evaluated.    Patient tolerated the procedure well and left in good condition.  Total number of lesions treated : 4.    Diagnostic Test Results:  none     MDM : she will return to complete shave removal of the remaining suspicious lesions.      ASSESSMENT:                                                      Encounter Diagnoses   Name Primary?     Skin cancer screening Yes     Neoplasm of uncertain behavior of skin      AK (actinic keratosis)      History of basal cell carcinoma      History of squamous cell carcinoma in situ of skin      Solar lentigo      Multiple melanocytic nevi          PLAN:                                                    Patient Instructions   FUTURE APPOINTMENTS    Follow up in 2-3 weeks for shave excisions of the four other lesions on the right lower leg and three lesions on the back.    Follow up in 1 year(s) for a full-body skin cancer screening.    Follow up per pathology report.    WOUND CARE INSTRUCTIONS  1. Wash hands before every dressing change.  2. After 24 hours, change dressing daily.  3. Wash the wound area with a mild soap, then rinse.  4. Gently pat dry with a sterile gauze or Q-tip.  5. Using a Q-tip, apply Vaseline or Aquaphor only over entire wound. Do NOT use Neosporin - as many people react to neomycin.  6. Finally, cover with a bandage or sterile non-stick gauze with micropore paper tape.  7. Repeat once daily until wound has healed.      Soap, water and shampoo will not hurt this area.    Do not go swimming or take baths, but showering is encouraged.    Limit use of the area where the procedure was done for a few days to allow for optimal healing.    If you experience bleeding:  Wash hands and hold firm pressure on the area for 10 minutes without checking to see if the bleeding has stopped. \"Checking\" pulls off the protective wound clot and restarts the bleeding all over again. Re-apply " pressure for 10 minutes if necessary to stop bleeding.  Use additional sterile gauze and tape to maintain pressure once bleeding has stopped.  If bleeding continues, then call back to clinic at [phone].    Signs of Infection:  Infection can occur in any area where skin has been disrupted.  If you notice persistent redness, swelling, colored drainage, increasing pain, fever or other signs of infection, please call us at: [phone] and ask to have me or my colleague paged. We will call you back to discuss.    Pathology Results:  You will be notified, generally via letter or MyChart, in approximately 10 days. If there is anything we need to discuss or further treatment needed, I will call you to discuss it.    PATIENT INFORMATION : WOUNDS  During the healing process you will notice a number of changes. All wounds develop a small halo of redness surrounding the wound.  This means healing is occurring. Severe itching with extensive redness usually indicates sensitivity to the ointment or bandage tape used to dress the wound.  You should call our office if this develops.      Swelling  and/or discoloration around your surgical site is common, particularly when performed around the eye.    All wounds normally drain.  The larger the wound the more drainage there will be.  After 7-10 days, you will notice the wound beginning to shrink and new skin will begin to grow.  The wound is healed when you can see skin has formed over the entire area.  A healed wound has a healthy, shiny look to the surface and is red to dark pink in color to normalize.  Wounds may take approximately 4-6 weeks to heal.  Larger wounds may take 6-8 weeks. After the wound is healed you may discontinue dressing changes.    You may experience a sensation of tightness as your wound heals. This is normal and will gradually subside.    Your healed wound may be sensitive to temperature changes. This sensitivity improves with time, but if you re  having a lot of discomfort, try to avoid temperature extremes.    Patients frequently experience itching after their wound appears to have healed because of the continue healing under the skin.  Plain Vaseline will help relieve the itching.    ACTINIC KERATOSES POST-TREATMENT CARE INSTRUCTIONS  Actinic keratoses are benign, scaly or gritty lesions that appear in sun-exposed areas and may progress to skin cancers. For this reason, it is important to treat them before they become cancerous. Liquid nitrogen is the most commonly used and most effective treatment for actinic keratoses; it is mildly uncomfortable when applied to the skin, but the discomfort rapidly subsides.    Post-Treatment:  You may experience burning and/or stinging immediately following the procedure. The discomfort from the procedure may persist over the next 12-24 hours. The area treated will look pinker and slightly swollen before the healing process begins. You may also notice redness, swelling, tenderness, weeping and crusts or scabs. Healing time is approximately 10-14 days.    Blister - You may or may notice blistering from the freezing. If you develop an uncomfortable blister from today's treatment, you may gently puncture this with a needle that has been cleaned with alcohol. However, do not remove the protective skin layer of the blister.    Scab - After a few days, you may notice scaliness or scab formation. Do not pick at the scabs because this may cause slower healing and a permanent scar.    The skin may appear temporarily darker at the treatment site, but this usually fades over a period of months, provided that the area is protected from the sun.    Care of the areas treated:    Wash the area with a mild cleanser.    Gently pat dry.    Do not rub.     Keep protected from the sun during the healing process and for a full year following treatment as the skin continues to remodel during this time.    Apply Vaseline or Aquaphor ointment  "sparingly to the site for the first 7 days after treatment.    Do not use Neosporin, as many people eventually develop a medication allergy, that can easily be confused with an infection, to Neomycin.    Return if:  There should not be any residual scaling. If there is any concern that the lesion has persisted after 4-6 weeks, make an appointment for a re-check. Healing time does vary depending on your individual healing process and the area of the body treated. Most patients will be healed in one month.    Signs of Infection:  Thankfully this is rare. However if you notice persistent colored drainage, increasing pain, fever or other signs of infection, please call us at: [phone]    SUN PROTECTION INSTRUCTIONS  Sun damage can lead to skin cancer and premature aging of the skin.      The best way to protect from sun damage to your skin is to avoid the sun during peak hours (10 am - 2 pm) even on overcast days.      Use UPF sun-protective clothing, which while more expensive initially provides longer lasting coverage without having to worry about remembering to re-apply.  1. Wear a wide-brimmed hat and sunglasses.   2. Wear sun-protective clothing.  [employer] other [employer] make sun protective clothing that are stylish, comfortable and cool. [employer] and other [employer] make UV arm sleeves suitable for golfing, gardening and other activities.      Sunscreen instructions:  1. Use sunscreens with Zinc Oxide, Titanium Dioxide or Avobenzone to protect from UVA rays.  2. Use SPF 30-50+ to protect from UVB rays.  3. Re-apply every 2 hours even if water resistant.  4. Apply on your face every day even when cloudy and even in the winter. UVA \"aging rays\" penetrate window glass and are just as strong in the winter as in the summer.    Product Recommendations:    Good examples include: Blue Lizard, EltaMD, Solbar    Good daily moisturizers with SPF: Vanicream, CeraVe.    For sensitive skin, consider : " "SkinMedica Essential Defense Mineral Shield Broad Spectrum SPF 35      Never use tanning beds. Tanning beds are associated with much higher risks of skin cancer.    All tanning damages the skin. Aim for ivory skin year round and you will have less trouble with your skin in years to come. There is no merit in getting \"a base tan\" before a warm weather vacation, as any tanning indicates your body's response to sun damage.    Stop smoking. Smokers have higher rates of skin cancer and also have premature skin wrinkling.    FYI  You should use about 3 tablespoons of sunscreen to protect your whole body. Thus a typical eight ounce bottle of sunscreen should last 4 applications. Remember, that the SPF rating is compromised if you don t apply enough. Most people only apply 1/2 - 1/3 of the amount they need. Also don t forget areas such as your ears, feet, upper back and harder to reach places. Keep in mind that these amounts should be increased for larger body sizes.    Sunscreens with titanium dioxide and/or zinc oxide in the active ingredients are physical blockers as opposed to chemical blockers. Chemical-free sunscreens should not irritate the skin.    Spray-on sunscreens may be used for touch-up application only, not as a base layer. Also, use with caution around small children due to inhalation risk.    Avoid retinyl palmitate products.    Avoid combination products that include both sunscreen and insect repellant, as sunscreen should be applied every 2 hours, but insect repellant should not be applied as frequently.    SPF means sun protection factor, which is just the degree to which the sunscreen can protect against UVB rays. There is no rating system for UVA rays. SPF is calculated as the time skin will burn when sunscreen is applied vs. skin without sunscreen.    Water resistant sunscreens should be re-applied every 1-2 hours.    For more " information:  http://www.skincancer.org/prevention/sun-protection/sunscreen/sunscreens-safe-and-effective    SKIN CANCER SELF-EXAM INSTRUCTIONS  Check every month in the mirror or with a household member. Be aware of any changes, especially bleeding or tenderness. Also, make sure to check your nails for color changes after removal of nail polish.    For melanoma, check for:  A - Asymmetry. One half unlike the other half.  B - Border. Irregular, scalloped, ragged, notched, blurred or poorly defined borders.  C - Color. Color variations from one area to another, with shades of tan, brown and/or black present. Sometimes white, red or blue.  D - Diameter. Greater than 6 mm (about the size of a pencil eraser). Any new growth of a mole should be concerning and be evaluated.  E - Evolving. A mole or skin lesion that looks different from the rest or is changing in size, shape or color.    For basal cell carcinoma and squamous cell carcinoma, check for:    Sores, shiny bumps, nodules, scaly lesions, or wart-like growths that are itchy, tender, crusting, scabbing, eroding, oozing or bleeding.    Open sores/wounds or reddish/irritated areas that do not heal within 2-3 weeks.    Scar-like areas that are white, yellow or waxy in color.    The patient was counseled about sunscreens and sun avoidance. The patient was counseled to check the skin regularly and report any lesion that is new, changing, itching, scabbing, bleeding or otherwise bothersome. The patient was discharged ambulatory and in stable condition.      PROCEDURES:                                                    Name : Shave Excision  Indication : Excision of tissue for pathology evaluation.  Location(s) : Left mid-arm : 5x3 mm in size, scaly, erythematous, indurated lesion. ? Basal cell carcinoma ? Squamous cell carcinoma ? Other.  Completed by : Katlyn Sultana MD  Photo Taken : yes.  Anesthesia : Patient was anesthetized by infiltrating the area surrounding the  lesion with 1% lidocaine.   epinephrine [id number] : Yes.  Buffered with bicarbonate : Yes.  Note : Discussed the risk of pain, infection, scarring, hypo- or hyperpigmentation and recurrence or need for re-treatment. The benefits of treatment and alternative treatments were also discussed.    During this procedure, the universal protocol was utilized. The patient's identity was confirmed by no less than two patient identifiers, correct procedure was verified, correct site was verified and marked as applicable and a final pause was completed.    Sterile technique was used throughout the procedure. The skin was cleaned and prepped with surgical cleanser. Once adequate anesthesia was obtained, the lesion was removed with a deep scallop shave procedure. The specimen was sent to pathology.    Direct pressure and aluminum chloride and monopolar cautery was applied for hemostasis. No bleeding was present upon the completion of the procedure. The wound was coated with antibacterial ointment. A dry sterile dressing was applied. Patient tolerated the procedure well and left in satisfactory condition.    Primary provider and referring provider will be informed regarding the tissue report when it returns.    Name : Shave Excision  Indication : Excision of tissue for pathology evaluation.  Location(s) : Left lateral mid-upper arm : 5 mm in size, scaly, indurated lesion. ? Basal cell carcinoma ? Squamous cell carcinoma ? Other.  Completed by : Katlyn Sultana MD  Photo Taken : yes.  Anesthesia : Patient was anesthetized by infiltrating the area surrounding the lesion with 1% lidocaine.   epinephrine [id number] : Yes.  Buffered with bicarbonate : Yes.  Note : Discussed the risk of pain, infection, scarring, hypo- or hyperpigmentation and recurrence or need for re-treatment. The benefits of treatment and alternative treatments were also discussed.    During this procedure, the universal protocol was utilized. The patient's identity was  confirmed by no less than two patient identifiers, correct procedure was verified, correct site was verified and marked as applicable and a final pause was completed.    Sterile technique was used throughout the procedure. The skin was cleaned and prepped with surgical cleanser. Once adequate anesthesia was obtained, the lesion was removed with a deep scallop shave procedure. The specimen was sent to pathology.    Direct pressure and aluminum chloride and monopolar cautery was applied for hemostasis. No bleeding was present upon the completion of the procedure. The wound was coated with antibacterial ointment. A dry sterile dressing was applied. Patient tolerated the procedure well and left in satisfactory condition.    Primary provider and referring provider will be informed regarding the tissue report when it returns.    Name : Shave Excision  Indication : Excision of tissue for pathology evaluation.  Location(s) : Right upper chest, 1 cm inferior to medial clavicle : 5 mm in size, raised, smooth lesion. ? Basal cell carcinoma ? Squamous cell carcinoma ? Other.  Completed by : Katlyn Sultana MD  Photo Taken : yes.  Anesthesia : Patient was anesthetized by infiltrating the area surrounding the lesion with 1% lidocaine.   epinephrine [id number] : Yes.  Buffered with bicarbonate : Yes.  Note : Discussed the risk of pain, infection, scarring, hypo- or hyperpigmentation and recurrence or need for re-treatment. The benefits of treatment and alternative treatments were also discussed.    During this procedure, the universal protocol was utilized. The patient's identity was confirmed by no less than two patient identifiers, correct procedure was verified, correct site was verified and marked as applicable and a final pause was completed.    Sterile technique was used throughout the procedure. The skin was cleaned and prepped with surgical cleanser. Once adequate anesthesia was obtained, the lesion was removed with a deep  scallop shave procedure. The specimen was sent to pathology.    Direct pressure and aluminum chloride and monopolar cautery was applied for hemostasis. No bleeding was present upon the completion of the procedure. The wound was coated with antibacterial ointment. A dry sterile dressing was applied. Patient tolerated the procedure well and left in satisfactory condition.    Primary provider and referring provider will be informed regarding the tissue report when it returns.    Name : Shave Excision  Indication : Excision of tissue for pathology evaluation.  Location(s) : Chest, midline at T3 : 9 x 2 mm in size, scaly, indurated lesion partially overlying previous excision site. Previous pathology from 7/2016 s/p ED&C indicated basal cell carcinoma. ? Basal cell carcinoma ? Other.  Completed by : Katlyn Sultana MD  Photo Taken : yes.  Anesthesia : Patient was anesthetized by infiltrating the area surrounding the lesion with 1% lidocaine.   epinephrine [id number] : Yes.  Buffered with bicarbonate : Yes.  Note : Discussed the risk of pain, infection, scarring, hypo- or hyperpigmentation and recurrence or need for re-treatment. The benefits of treatment and alternative treatments were also discussed.    During this procedure, the universal protocol was utilized. The patient's identity was confirmed by no less than two patient identifiers, correct procedure was verified, correct site was verified and marked as applicable and a final pause was completed.    Sterile technique was used throughout the procedure. The skin was cleaned and prepped with surgical cleanser. Once adequate anesthesia was obtained, the lesion was removed with a deep scallop shave procedure. The specimen was sent to pathology.    Direct pressure and aluminum chloride and monopolar cautery was applied for hemostasis. No bleeding was present upon the completion of the procedure. The wound was coated with antibacterial ointment. A dry sterile dressing was  applied. Patient tolerated the procedure well and left in satisfactory condition.    Primary provider and referring provider will be informed regarding the tissue report when it returns.    Name : Shave Excision  Indication : Excision of tissue for pathology evaluation.  Location(s) : Left upper chest, 1 cm inferior to medial clavicle : 2 mm in size, raised coarse-textured, erythematous lesion within previous scar. ? Actinic keratosis ? Recurrent basal cell carcinoma ? Other.  Completed by : Katlyn Sultana MD  Photo Taken : yes.  Anesthesia : Patient was anesthetized by infiltrating the area surrounding the lesion with 1% lidocaine.   epinephrine [id number] : Yes.  Buffered with bicarbonate : Yes.  Note : Discussed the risk of pain, infection, scarring, hypo- or hyperpigmentation and recurrence or need for re-treatment. The benefits of treatment and alternative treatments were also discussed.    During this procedure, the universal protocol was utilized. The patient's identity was confirmed by no less than two patient identifiers, correct procedure was verified, correct site was verified and marked as applicable and a final pause was completed.    Sterile technique was used throughout the procedure. The skin was cleaned and prepped with surgical cleanser. Once adequate anesthesia was obtained, the lesion was removed with a deep scallop shave procedure. The specimen was sent to pathology.    Direct pressure and aluminum chloride and monopolar cautery was applied for hemostasis. No bleeding was present upon the completion of the procedure. The wound was coated with antibacterial ointment. A dry sterile dressing was applied. Patient tolerated the procedure well and left in satisfactory condition.    Primary provider and referring provider will be informed regarding the tissue report when it returns.      The information in this document, created by the medical scribe for me, accurately reflects the services I personally  performed and the decisions made by me. I have reviewed and approved this document for accuracy prior to leaving the patient care area.  Katlyn Sultana MD June 18, 2018 7:46 AM  [facility] [place]

## 2018-06-18 NOTE — MR AVS SNAPSHOT
"              After Visit Summary   6/18/2018    Jacqueline Banegas    MRN: [id number]           Patient Information     Date Of Birth          1955        Visit Information        Provider Department      6/18/2018 7:40 AM Garima Sultana MD [facility] – [place], [place]        Today's Diagnoses     Skin cancer screening    -  1    Neoplasm of uncertain behavior of skin        AK (actinic keratosis)        History of basal cell carcinoma        History of squamous cell carcinoma in situ of skin        Solar lentigo        Multiple melanocytic nevi          Care Instructions    FUTURE APPOINTMENTS    Follow up in 2 weeks for a 20 minute appointment for shave excisions of other lesions and pathology discussion.    Follow up every 6 months for a full-body skin cancer screening.    Follow up per pathology report.    WOUND CARE INSTRUCTIONS  1. Wash hands before every dressing change.  2. After 24 hours, change dressing daily.  3. Wash the wound area with a mild soap, then rinse.  4. Gently pat dry with a sterile gauze or Q-tip.  5. Using a Q-tip, apply Vaseline or Aquaphor only over entire wound. Do NOT use Neosporin - as many people react to neomycin.  6. Finally, cover with a bandage or sterile non-stick gauze with micropore paper tape.  7. Repeat once daily until wound has healed.      Soap, water and shampoo will not hurt this area.    Do not go swimming or take baths, but showering is encouraged.    Limit use of the area where the procedure was done for a few days to allow for optimal healing.    If you experience bleeding:  Wash hands and hold firm pressure on the area for 10 minutes without checking to see if the bleeding has stopped. \"Checking\" pulls off the protective wound clot and restarts the bleeding all over again. Re-apply pressure for 10 minutes if necessary to stop bleeding.  Use additional sterile gauze and tape to maintain pressure once bleeding has stopped.  If bleeding continues, then " call back to clinic at [phone].    Signs of Infection:  Infection can occur in any area where skin has been disrupted.  If you notice persistent redness, swelling, colored drainage, increasing pain, fever or other signs of infection, please call us at: [phone] and ask to have me or my colleague paged. We will call you back to discuss.    Pathology Results:  You will be notified, generally via letter or MyChart, in approximately 10 days. If there is anything we need to discuss or further treatment needed, I will call you to discuss it.    PATIENT INFORMATION : WOUNDS  During the healing process you will notice a number of changes. All wounds develop a small halo of redness surrounding the wound.  This means healing is occurring. Severe itching with extensive redness usually indicates sensitivity to the ointment or bandage tape used to dress the wound.  You should call our office if this develops.      Swelling  and/or discoloration around your surgical site is common, particularly when performed around the eye.    All wounds normally drain.  The larger the wound the more drainage there will be.  After 7-10 days, you will notice the wound beginning to shrink and new skin will begin to grow.  The wound is healed when you can see skin has formed over the entire area.  A healed wound has a healthy, shiny look to the surface and is red to dark pink in color to normalize.  Wounds may take approximately 4-6 weeks to heal.  Larger wounds may take 6-8 weeks. After the wound is healed you may discontinue dressing changes.    You may experience a sensation of tightness as your wound heals. This is normal and will gradually subside.    Your healed wound may be sensitive to temperature changes. This sensitivity improves with time, but if you re having a lot of discomfort, try to avoid temperature extremes.    Patients frequently experience itching after their wound appears to have healed because of the continue  healing under the skin.  Plain Vaseline will help relieve the itching.    ACTINIC KERATOSES POST-TREATMENT CARE INSTRUCTIONS  Actinic keratoses are benign, scaly or gritty lesions that appear in sun-exposed areas and may progress to skin cancers. For this reason, it is important to treat them before they become cancerous. Liquid nitrogen is the most commonly used and most effective treatment for actinic keratoses; it is mildly uncomfortable when applied to the skin, but the discomfort rapidly subsides.    Post-Treatment:  You may experience burning and/or stinging immediately following the procedure. The discomfort from the procedure may persist over the next 12-24 hours. The area treated will look pinker and slightly swollen before the healing process begins. You may also notice redness, swelling, tenderness, weeping and crusts or scabs. Healing time is approximately 10-14 days.    Blister - You may or may notice blistering from the freezing. If you develop an uncomfortable blister from today's treatment, you may gently puncture this with a needle that has been cleaned with alcohol. However, do not remove the protective skin layer of the blister.    Scab - After a few days, you may notice scaliness or scab formation. Do not pick at the scabs because this may cause slower healing and a permanent scar.    The skin may appear temporarily darker at the treatment site, but this usually fades over a period of months, provided that the area is protected from the sun.    Care of the areas treated:    Wash the area with a mild cleanser.    Gently pat dry.    Do not rub.     Keep protected from the sun during the healing process and for a full year following treatment as the skin continues to remodel during this time.    Apply Vaseline or Aquaphor ointment sparingly to the site for the first 7 days after treatment.    Do not use Neosporin, as many people eventually develop a medication allergy, that can easily be confused  "with an infection, to Neomycin.    Return if:  There should not be any residual scaling. If there is any concern that the lesion has persisted after 4-6 weeks, make an appointment for a re-check. Healing time does vary depending on your individual healing process and the area of the body treated. Most patients will be healed in one month.    Signs of Infection:  Thankfully this is rare. However if you notice persistent colored drainage, increasing pain, fever or other signs of infection, please call us at: [phone]    SUN PROTECTION INSTRUCTIONS  Sun damage can lead to skin cancer and premature aging of the skin.      The best way to protect from sun damage to your skin is to avoid the sun during peak hours (10 am - 2 pm) even on overcast days.      Use UPF sun-protective clothing, which while more expensive initially provides longer lasting coverage without having to worry about remembering to re-apply.  1. Wear a wide-brimmed hat and sunglasses.   2. Wear sun-protective clothing.  [employer] and other [employer] make sun protective clothing that are stylish, comfortable and cool. [employer] and other [employer] make UV arm sleeves suitable for golfing, gardening and other activities.      Sunscreen instructions:  1. Use sunscreens with Zinc Oxide, Titanium Dioxide or Avobenzone to protect from UVA rays.  2. Use SPF 30-50+ to protect from UVB rays.  3. Re-apply every 2 hours even if water resistant.  4. Apply on your face every day even when cloudy and even in the winter. UVA \"aging rays\" penetrate window glass and are just as strong in the winter as in the summer.    Product Recommendations:    Good examples include: Blue Lucero, EltaMD, Solbar    Good daily moisturizers with SPF: Vanicream, CeraVe.    For sensitive skin, consider : SkinMedica Essential Defense Mineral Shield Broad Spectrum SPF 35      Never use tanning beds. Tanning beds are associated with much higher risks of skin cancer.    All " "tanning damages the skin. Aim for ivory skin year round and you will have less trouble with your skin in years to come. There is no merit in getting \"a base tan\" before a warm weather vacation, as any tanning indicates your body's response to sun damage.    Stop smoking. Smokers have higher rates of skin cancer and also have premature skin wrinkling.    FYI  You should use about 3 tablespoons of sunscreen to protect your whole body. Thus a typical eight ounce bottle of sunscreen should last 4 applications. Remember, that the SPF rating is compromised if you don t apply enough. Most people only apply 1/2 - 1/3 of the amount they need. Also don t forget areas such as your ears, feet, upper back and harder to reach places. Keep in mind that these amounts should be increased for larger body sizes.    Sunscreens with titanium dioxide and/or zinc oxide in the active ingredients are physical blockers as opposed to chemical blockers. Chemical-free sunscreens should not irritate the skin.    Spray-on sunscreens may be used for touch-up application only, not as a base layer. Also, use with caution around small children due to inhalation risk.    Avoid retinyl palmitate products.    Avoid combination products that include both sunscreen and insect repellant, as sunscreen should be applied every 2 hours, but insect repellant should not be applied as frequently.    SPF means sun protection factor, which is just the degree to which the sunscreen can protect against UVB rays. There is no rating system for UVA rays. SPF is calculated as the time skin will burn when sunscreen is applied vs. skin without sunscreen.    Water resistant sunscreens should be re-applied every 1-2 hours.    For more information:  http://www.skincancer.org/prevention/sun-protection/sunscreen/sunscreens-safe-and-effective    SKIN CANCER SELF-EXAM INSTRUCTIONS  Check every month in the mirror or with a household member. Be aware of any changes, especially " bleeding or tenderness. Also, make sure to check your nails for color changes after removal of nail polish.    For melanoma, check for:  A - Asymmetry. One half unlike the other half.  B - Border. Irregular, scalloped, ragged, notched, blurred or poorly defined borders.  C - Color. Color variations from one area to another, with shades of tan, brown and/or black present. Sometimes white, red or blue.  D - Diameter. Greater than 6 mm (about the size of a pencil eraser). Any new growth of a mole should be concerning and be evaluated.  E - Evolving. A mole or skin lesion that looks different from the rest or is changing in size, shape or color.    For basal cell carcinoma and squamous cell carcinoma, check for:    Sores, shiny bumps, nodules, scaly lesions, or wart-like growths that are itchy, tender, crusting, scabbing, eroding, oozing or bleeding.    Open sores/wounds or reddish/irritated areas that do not heal within 2-3 weeks.    Scar-like areas that are white, yellow or waxy in color.          Follow-ups after your visit        Who to contact     If you have questions or need follow up information about today's clinic visit or your schedule please contact [facility] [place] directly at [phone].  Normal or non-critical lab and imaging results will be communicated to you by Gl[employer], letter or phone within 4 business days after the clinic has received the results. If you do not hear from us within 7 days, please contact the clinic through MyChart or phone. If you have a critical or abnormal lab result, we will notify you by phone as soon as possible.  Submit refill requests through [employer] or call your pharmacy and they will forward the refill request to us. Please allow 3 business days for your refill to be completed.          Additional Information About Your Visit        [employer] Information     [employer] gives you secure access to your electronic health record. If you see a primary care provider, you  can also send messages to your care team and make appointments. If you have questions, please call your primary care clinic.  If you do not have a primary care provider, please call [phone] and they will assist you.        Care EveryWhere ID     This is your Care EveryWhere ID. This could be used by other organizations to access your [facility] medical records  [id number]        Your Vitals Were     Pulse Pulse Oximetry                88 98%           Blood Pressure from Last 3 Encounters:   06/18/18 146/87   12/05/17 125/90   10/31/17 132/80    Weight from Last 3 Encounters:   11/30/17 158 lb (71.7 kg)   10/31/17 159 lb (72.1 kg)   07/11/17 158 lb (71.7 kg)              Today, you had the following     No orders found for display       Primary Care Provider Office Phone # Fax #    Venessa Carter [phone]       [address] [facility] [address]        Equal Access to Services     [facility] Hospital[facility] : Hadii aad ku hadasho Soomaali, waaxda luqadaha, qaybta kaalmada adeegyada, waxay idiin hayaan adeeg kharash la'dorindan . So [facility] [phone].    ATENCIÓN: Si habla español, tiene a [place] disposición servicios gratuitos de asistencia lingüística. Llame al [phone].    We comply with applicable federal civil rights laws and [place] laws. We do not discriminate on the basis of race, color, national origin, age, disability, sex, sexual orientation, or gender identity.            Thank you!     Thank you for choosing [facility] [place]  for your care. Our goal is always to provide you with excellent care. Hearing back from our patients is one way we can continue to improve our services. Please take a few minutes to complete the written survey that you may receive in the mail after your visit with us. Thank you!             Your Updated Medication List - Protect others around you: Learn how to safely use, store and throw away your medicines at www.disposemymeds.org.          This list is  accurate as of 6/18/18  8:18 AM.  Always use your most recent med list.                   Brand Name Dispense Instructions for use Diagnosis    ALLEGRA PO      Take by mouth as needed for allergies Reported on 5/9/2017        aspirin 81 MG tablet      Take 81 mg by mouth daily Reported on 5/9/2017        simvastatin 40 MG tablet    ZOCOR    90 tablet    Take 1 tablet by mouth daily    Hyperlipidemia with target LDL less than 130       SUMAtriptan 50 MG tablet    IMITREX    9 tablet    Take 1-2 tablets ( mg) by mouth at onset of headache for migraine May repeat in 2 hours if needed: max 2/day    Migraine without status migrainosus, not intractable, unspecified migraine type       SYNTHROID 112 MCG tablet   Generic drug:  levothyroxine     90 tablet    Take 1 tablet by mouth daily    Acquired hypothyroidism

## 2018-06-18 NOTE — LETTER
6/18/2018         RE: Jacqueline Banegas  [address]  [place] [address]        Dear Colleague,    Thank you for referring your patient, Jacqueline Banegas, to the [facility]. Please see a copy of my visit note below.    [facility] - PRIMARY CARE SKIN    CC : skin cancer screening (full-body)  SUBJECTIVE:                                                    Jacqueline Banegas is a 63 year old female who presents to clinic today for a full-body skin exam because of her history of skin cancer.    Bothersome lesions noticed by the patient or other skin concerns :  Issue One : Lesion on chest.  Onset : 2-3 months  Issue Two : Lesions on both arms.  Onset : 2-3 months  Issue Three : Lesions on lower legs.    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 ? Skin cancers on leg  Family history of skin cancer : YES - non-melanoma skin cancer in father and sister.  Autoimmune : NO    Ancestry : Edith, English, South Korean, Northern     Sun Exposure History  Previous history of significant sun exposure:  Blistering sunburns : YES  Tanning beds : YES - when younger.  Sunscreen Use : YES, SPF : 50.  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    Refer to electronic medical record (EMR) for past medical history and medications.    INTEGUMENTARY/SKIN: POSITIVE for changing lesions  ROS : 14 point review of systems was negative except the symptoms listed above in the HPI.    This document serves as a record of the services and decisions personally performed and made by Katlyn Sultana MD. It was created on her behalf by  Nicho Garay, a trained medical scribe.  The creation of this document is based on the scribe's personal observations and the provider's statements to the medical scribe.  Nicho Garay, June 18, 2018 7:46 AM      OBJECTIVE:                                                    GENERAL: healthy, alert and no distress  SKIN: Choudhary Skin Type - I.  This patient was examined from the top of the head to the bottom of the feet  including scalp, face, neck, back, chest, breasts, buttocks, both arms, both legs, both hands, both feet, all 10 fingers and all 10 toes. The dermatoscope was used to help evaluate pigmented lesions.  Skin Pertinent Findings:  Right arm(s) and left arm(s) : Multiple brown, macule(s) most consistent with benign solar lentigo. Multiple brown macules of various sizes and shapes most consistent with (benign) melanocytic nevi.    Abdomen : Multiple brown macules of various sizes and shapes most consistent with (benign) melanocytic nevi.    Back : brown, macule(s) most consistent with benign solar lentigo.              Diffuse actinic damage on the skin  Significant Findings:  Left mid-arm : 5x3 mm in size, scaly, erythematous, indurated lesion. ? Basal cell carcinoma ? Squamous cell carcinoma ? Other      Left lateral mid-upper arm : 5 mm in size, scaly, indurated lesion. ? Basal cell carcinoma ? Squamous cell carcinoma ? Other      Right upper chest, 1 cm inferior to medial clavicle : 5 mm in size, raised, smooth lesion. ? Basal cell carcinoma ? Squamous cell carcinoma ? Other    Chest, midline at T3 : 9 x 2 mm in size, scaly, indurated lesion partially overlying previous excision site. Previous pathology from 7/2016 s/p ED&C indicated basal cell carcinoma. ? Basal cell carcinoma ? Other    Left upper chest, 1 cm inferior to medial clavicle : 2 mm in size, raised coarse-textured, erythematous lesion within previous scar. ? Actinic keratosis ? Recurrent basal cell carcinoma ? Other      Right lower leg,  inferior to medial malleolus : 6 x 4 mm in size, scaly erythematous, indurated lesion. ? Actinic keratosis ? Squamous cell carcinoma ? Other. Patient return for shave excision.    Right medial mid-calf, 15 cm superior to medial malleolus : 4 mm in size, scaly, indurated lesion. ? Actinic keratosis ? Squamous cell carcinoma ? Other. Patient return for shave excision.    Right medial calf, 6 cm superior to medial malleolus : 7 x 5 mm in size, scaly, erythematous, indurated lesion. ? Actinic keratosis ? Squamous cell carcinoma ? Other. Patient return for shave excision.      Mid-back, at T4 : 6 x 4 mm in size, scaly, erythematous lesion. ? Actinic keratosis ? Squamous cell carcinoma ? Other. Patient to return for shave excision.      Right lateral back, mid-posterior axillary line : 6 x 3 mm in size, erythematous, indurated, scaly lesion. ? Actinic keratosis ? Squamous cell carcinoma ? Other. Patient to return for shave excision.    Back, 12 cm right of midline, at T7 : 4 mm in size, erythematous, scaly, indurated lesion. ? Actinic keratosis ? Squamous cell carcinoma ? Other. Patient to return for shave excision.      Left lateral posterior leg, 2 cm inferior to flexor crease : 7 x 5 mm in size, indurated, scaly, erythematous lesion. ? Basal cell carcinoma ? Squamous cell carcinoma ? Other. Patient to return for shave excision.      Arms, Trunk : 3-10 mm in size, erythematous, scaly, non-indurated lesion(s) most consistent with actinic keratoses.  Name : Liquid Nitrogen Cry-Ac Cryotherapy.  Indication : Pre-malignant lesion.  Location(s) : left upper arm - x2, chest midline - x1, left mid-upper breast - x1 .  Completed by : Katlyn Sultana MD  Note : Discussed natural history of lesion and treatment options. Prior to treatment, we discussed inflammation, tenderness post-procedure, the healing process, and the risks of pain, infection, scarring, blistering, and hypo-/hyperpigmentation after healing. Explained that these  lesions may grow back and may need additional treatment or re-treatment. The patient expressed a desire to proceed with cryotherapy.    The lesion(s) was treated with liquid nitrogen Cry-Ac, five second freeze repeated twice with a pause to allow for the area to thaw. If this lesion should recur, then it needs to be re-evaluated.    Patient tolerated the procedure well and left in good condition.  Total number of lesions treated : 4.    Diagnostic Test Results:  none     MDM : she will return to complete shave removal of the remaining suspicious lesions.      ASSESSMENT:                                                      Encounter Diagnoses   Name Primary?     Skin cancer screening Yes     Neoplasm of uncertain behavior of skin      AK (actinic keratosis)      History of basal cell carcinoma      History of squamous cell carcinoma in situ of skin      Solar lentigo      Multiple melanocytic nevi          PLAN:                                                    Patient Instructions   FUTURE APPOINTMENTS    Follow up in 2-3 weeks for shave excisions of the four other lesions on the right lower leg and three lesions on the back.    Follow up in 1 year(s) for a full-body skin cancer screening.    Follow up per pathology report.    WOUND CARE INSTRUCTIONS  1. Wash hands before every dressing change.  2. After 24 hours, change dressing daily.  3. Wash the wound area with a mild soap, then rinse.  4. Gently pat dry with a sterile gauze or Q-tip.  5. Using a Q-tip, apply Vaseline or Aquaphor only over entire wound. Do NOT use Neosporin - as many people react to neomycin.  6. Finally, cover with a bandage or sterile non-stick gauze with micropore paper tape.  7. Repeat once daily until wound has healed.      Soap, water and shampoo will not hurt this area.    Do not go swimming or take baths, but showering is encouraged.    Limit use of the area where the procedure was done for a few days to allow for optimal  "healing.    If you experience bleeding:  Wash hands and hold firm pressure on the area for 10 minutes without checking to see if the bleeding has stopped. \"Checking\" pulls off the protective wound clot and restarts the bleeding all over again. Re-apply pressure for 10 minutes if necessary to stop bleeding.  Use additional sterile gauze and tape to maintain pressure once bleeding has stopped.  If bleeding continues, then call back to clinic at [phone].    Signs of Infection:  Infection can occur in any area where skin has been disrupted.  If you notice persistent redness, swelling, colored drainage, increasing pain, fever or other signs of infection, please call us at: [phone] and ask to have me or my colleague paged. We will call you back to discuss.    Pathology Results:  You will be notified, generally via letter or MyChart, in approximately 10 days. If there is anything we need to discuss or further treatment needed, I will call you to discuss it.    PATIENT INFORMATION : WOUNDS  During the healing process you will notice a number of changes. All wounds develop a small halo of redness surrounding the wound.  This means healing is occurring. Severe itching with extensive redness usually indicates sensitivity to the ointment or bandage tape used to dress the wound.  You should call our office if this develops.      Swelling  and/or discoloration around your surgical site is common, particularly when performed around the eye.    All wounds normally drain.  The larger the wound the more drainage there will be.  After 7-10 days, you will notice the wound beginning to shrink and new skin will begin to grow.  The wound is healed when you can see skin has formed over the entire area.  A healed wound has a healthy, shiny look to the surface and is red to dark pink in color to normalize.  Wounds may take approximately 4-6 weeks to heal.  Larger wounds may take 6-8 weeks. After the wound is healed you may " discontinue dressing changes.    You may experience a sensation of tightness as your wound heals. This is normal and will gradually subside.    Your healed wound may be sensitive to temperature changes. This sensitivity improves with time, but if you re having a lot of discomfort, try to avoid temperature extremes.    Patients frequently experience itching after their wound appears to have healed because of the continue healing under the skin.  Plain Vaseline will help relieve the itching.    ACTINIC KERATOSES POST-TREATMENT CARE INSTRUCTIONS  Actinic keratoses are benign, scaly or gritty lesions that appear in sun-exposed areas and may progress to skin cancers. For this reason, it is important to treat them before they become cancerous. Liquid nitrogen is the most commonly used and most effective treatment for actinic keratoses; it is mildly uncomfortable when applied to the skin, but the discomfort rapidly subsides.    Post-Treatment:  You may experience burning and/or stinging immediately following the procedure. The discomfort from the procedure may persist over the next 12-24 hours. The area treated will look pinker and slightly swollen before the healing process begins. You may also notice redness, swelling, tenderness, weeping and crusts or scabs. Healing time is approximately 10-14 days.    Blister - You may or may notice blistering from the freezing. If you develop an uncomfortable blister from today's treatment, you may gently puncture this with a needle that has been cleaned with alcohol. However, do not remove the protective skin layer of the blister.    Scab - After a few days, you may notice scaliness or scab formation. Do not pick at the scabs because this may cause slower healing and a permanent scar.    The skin may appear temporarily darker at the treatment site, but this usually fades over a period of months, provided that the area is protected from the sun.    Care of the areas treated:    Wash  "the area with a mild cleanser.    Gently pat dry.    Do not rub.     Keep protected from the sun during the healing process and for a full year following treatment as the skin continues to remodel during this time.    Apply Vaseline or Aquaphor ointment sparingly to the site for the first 7 days after treatment.    Do not use Neosporin, as many people eventually develop a medication allergy, that can easily be confused with an infection, to Neomycin.    Return if:  There should not be any residual scaling. If there is any concern that the lesion has persisted after 4-6 weeks, make an appointment for a re-check. Healing time does vary depending on your individual healing process and the area of the body treated. Most patients will be healed in one month.    Signs of Infection:  Thankfully this is rare. However if you notice persistent colored drainage, increasing pain, fever or other signs of infection, please call us at: [phone]    SUN PROTECTION INSTRUCTIONS  Sun damage can lead to skin cancer and premature aging of the skin.      The best way to protect from sun damage to your skin is to avoid the sun during peak hours (10 am - 2 pm) even on overcast days.      Use UPF sun-protective clothing, which while more expensive initially provides longer lasting coverage without having to worry about remembering to re-apply.  1. Wear a wide-brimmed hat and sunglasses.   2. Wear sun-protective clothing.  [employer] and other [employer] make sun protective clothing that are stylish, comfortable and cool. [employer] and other [employer] make UV arm sleeves suitable for golfing, gardening and other activities.      Sunscreen instructions:  1. Use sunscreens with Zinc Oxide, Titanium Dioxide or Avobenzone to protect from UVA rays.  2. Use SPF 30-50+ to protect from UVB rays.  3. Re-apply every 2 hours even if water resistant.  4. Apply on your face every day even when cloudy and even in the winter. UVA \"aging " "rays\" penetrate window glass and are just as strong in the winter as in the summer.    Product Recommendations:    Good examples include: Blue Lizard, EltaMD, Solbar    Good daily moisturizers with SPF: Vanicream, CeraVe.    For sensitive skin, consider : SkinMedica Essential Defense Mineral Shield Broad Spectrum SPF 35      Never use tanning beds. Tanning beds are associated with much higher risks of skin cancer.    All tanning damages the skin. Aim for ivory skin year round and you will have less trouble with your skin in years to come. There is no merit in getting \"a base tan\" before a warm weather vacation, as any tanning indicates your body's response to sun damage.    Stop smoking. Smokers have higher rates of skin cancer and also have premature skin wrinkling.    FYI  You should use about 3 tablespoons of sunscreen to protect your whole body. Thus a typical eight ounce bottle of sunscreen should last 4 applications. Remember, that the SPF rating is compromised if you don t apply enough. Most people only apply 1/2 - 1/3 of the amount they need. Also don t forget areas such as your ears, feet, upper back and harder to reach places. Keep in mind that these amounts should be increased for larger body sizes.    Sunscreens with titanium dioxide and/or zinc oxide in the active ingredients are physical blockers as opposed to chemical blockers. Chemical-free sunscreens should not irritate the skin.    Spray-on sunscreens may be used for touch-up application only, not as a base layer. Also, use with caution around small children due to inhalation risk.    Avoid retinyl palmitate products.    Avoid combination products that include both sunscreen and insect repellant, as sunscreen should be applied every 2 hours, but insect repellant should not be applied as frequently.    SPF means sun protection factor, which is just the degree to which the sunscreen can protect against UVB rays. There is no rating system for UVA " rays. SPF is calculated as the time skin will burn when sunscreen is applied vs. skin without sunscreen.    Water resistant sunscreens should be re-applied every 1-2 hours.    For more information:  http://www.skincancer.org/prevention/sun-protection/sunscreen/sunscreens-safe-and-effective    SKIN CANCER SELF-EXAM INSTRUCTIONS  Check every month in the mirror or with a household member. Be aware of any changes, especially bleeding or tenderness. Also, make sure to check your nails for color changes after removal of nail polish.    For melanoma, check for:  A - Asymmetry. One half unlike the other half.  B - Border. Irregular, scalloped, ragged, notched, blurred or poorly defined borders.  C - Color. Color variations from one area to another, with shades of tan, brown and/or black present. Sometimes white, red or blue.  D - Diameter. Greater than 6 mm (about the size of a pencil eraser). Any new growth of a mole should be concerning and be evaluated.  E - Evolving. A mole or skin lesion that looks different from the rest or is changing in size, shape or color.    For basal cell carcinoma and squamous cell carcinoma, check for:    Sores, shiny bumps, nodules, scaly lesions, or wart-like growths that are itchy, tender, crusting, scabbing, eroding, oozing or bleeding.    Open sores/wounds or reddish/irritated areas that do not heal within 2-3 weeks.    Scar-like areas that are white, yellow or waxy in color.    The patient was counseled about sunscreens and sun avoidance. The patient was counseled to check the skin regularly and report any lesion that is new, changing, itching, scabbing, bleeding or otherwise bothersome. The patient was discharged ambulatory and in stable condition.      PROCEDURES:                                                    Name : Shave Excision  Indication : Excision of tissue for pathology evaluation.  Location(s) : Left mid-arm : 5x3 mm in size, scaly, erythematous, indurated lesion. ? Basal  cell carcinoma ? Squamous cell carcinoma ? Other.  Completed by : Katlyn Sultana MD  Photo Taken : yes.  Anesthesia : Patient was anesthetized by infiltrating the area surrounding the lesion with 1% lidocaine.   epinephrine [id number] : Yes.  Buffered with bicarbonate : Yes.  Note : Discussed the risk of pain, infection, scarring, hypo- or hyperpigmentation and recurrence or need for re-treatment. The benefits of treatment and alternative treatments were also discussed.    During this procedure, the universal protocol was utilized. The patient's identity was confirmed by no less than two patient identifiers, correct procedure was verified, correct site was verified and marked as applicable and a final pause was completed.    Sterile technique was used throughout the procedure. The skin was cleaned and prepped with surgical cleanser. Once adequate anesthesia was obtained, the lesion was removed with a deep scallop shave procedure. The specimen was sent to pathology.    Direct pressure and aluminum chloride and monopolar cautery was applied for hemostasis. No bleeding was present upon the completion of the procedure. The wound was coated with antibacterial ointment. A dry sterile dressing was applied. Patient tolerated the procedure well and left in satisfactory condition.    Primary provider and referring provider will be informed regarding the tissue report when it returns.    Name : Shave Excision  Indication : Excision of tissue for pathology evaluation.  Location(s) : Left lateral mid-upper arm : 5 mm in size, scaly, indurated lesion. ? Basal cell carcinoma ? Squamous cell carcinoma ? Other.  Completed by : Katlyn Sultana MD  Photo Taken : yes.  Anesthesia : Patient was anesthetized by infiltrating the area surrounding the lesion with 1% lidocaine.   epinephrine [id number] : Yes.  Buffered with bicarbonate : Yes.  Note : Discussed the risk of pain, infection, scarring, hypo- or hyperpigmentation and recurrence or need  for re-treatment. The benefits of treatment and alternative treatments were also discussed.    During this procedure, the universal protocol was utilized. The patient's identity was confirmed by no less than two patient identifiers, correct procedure was verified, correct site was verified and marked as applicable and a final pause was completed.    Sterile technique was used throughout the procedure. The skin was cleaned and prepped with surgical cleanser. Once adequate anesthesia was obtained, the lesion was removed with a deep scallop shave procedure. The specimen was sent to pathology.    Direct pressure and aluminum chloride and monopolar cautery was applied for hemostasis. No bleeding was present upon the completion of the procedure. The wound was coated with antibacterial ointment. A dry sterile dressing was applied. Patient tolerated the procedure well and left in satisfactory condition.    Primary provider and referring provider will be informed regarding the tissue report when it returns.    Name : Shave Excision  Indication : Excision of tissue for pathology evaluation.  Location(s) : Right upper chest, 1 cm inferior to medial clavicle : 5 mm in size, raised, smooth lesion. ? Basal cell carcinoma ? Squamous cell carcinoma ? Other.  Completed by : Katlyn Sultana MD  Photo Taken : yes.  Anesthesia : Patient was anesthetized by infiltrating the area surrounding the lesion with 1% lidocaine.   epinephrine [id number] : Yes.  Buffered with bicarbonate : Yes.  Note : Discussed the risk of pain, infection, scarring, hypo- or hyperpigmentation and recurrence or need for re-treatment. The benefits of treatment and alternative treatments were also discussed.    During this procedure, the universal protocol was utilized. The patient's identity was confirmed by no less than two patient identifiers, correct procedure was verified, correct site was verified and marked as applicable and a final pause was  completed.    Sterile technique was used throughout the procedure. The skin was cleaned and prepped with surgical cleanser. Once adequate anesthesia was obtained, the lesion was removed with a deep scallop shave procedure. The specimen was sent to pathology.    Direct pressure and aluminum chloride and monopolar cautery was applied for hemostasis. No bleeding was present upon the completion of the procedure. The wound was coated with antibacterial ointment. A dry sterile dressing was applied. Patient tolerated the procedure well and left in satisfactory condition.    Primary provider and referring provider will be informed regarding the tissue report when it returns.    Name : Shave Excision  Indication : Excision of tissue for pathology evaluation.  Location(s) : Chest, midline at T3 : 9 x 2 mm in size, scaly, indurated lesion partially overlying previous excision site. Previous pathology from 7/2016 s/p ED&C indicated basal cell carcinoma. ? Basal cell carcinoma ? Other.  Completed by : Katlyn Sultana MD  Photo Taken : yes.  Anesthesia : Patient was anesthetized by infiltrating the area surrounding the lesion with 1% lidocaine.   epinephrine [id number] : Yes.  Buffered with bicarbonate : Yes.  Note : Discussed the risk of pain, infection, scarring, hypo- or hyperpigmentation and recurrence or need for re-treatment. The benefits of treatment and alternative treatments were also discussed.    During this procedure, the universal protocol was utilized. The patient's identity was confirmed by no less than two patient identifiers, correct procedure was verified, correct site was verified and marked as applicable and a final pause was completed.    Sterile technique was used throughout the procedure. The skin was cleaned and prepped with surgical cleanser. Once adequate anesthesia was obtained, the lesion was removed with a deep scallop shave procedure. The specimen was sent to pathology.    Direct pressure and aluminum  chloride and monopolar cautery was applied for hemostasis. No bleeding was present upon the completion of the procedure. The wound was coated with antibacterial ointment. A dry sterile dressing was applied. Patient tolerated the procedure well and left in satisfactory condition.    Primary provider and referring provider will be informed regarding the tissue report when it returns.    Name : Shave Excision  Indication : Excision of tissue for pathology evaluation.  Location(s) : Left upper chest, 1 cm inferior to medial clavicle : 2 mm in size, raised coarse-textured, erythematous lesion within previous scar. ? Actinic keratosis ? Recurrent basal cell carcinoma ? Other.  Completed by : Katlyn Sultana MD  Photo Taken : yes.  Anesthesia : Patient was anesthetized by infiltrating the area surrounding the lesion with 1% lidocaine.   epinephrine [id number] : Yes.  Buffered with bicarbonate : Yes.  Note : Discussed the risk of pain, infection, scarring, hypo- or hyperpigmentation and recurrence or need for re-treatment. The benefits of treatment and alternative treatments were also discussed.    During this procedure, the universal protocol was utilized. The patient's identity was confirmed by no less than two patient identifiers, correct procedure was verified, correct site was verified and marked as applicable and a final pause was completed.    Sterile technique was used throughout the procedure. The skin was cleaned and prepped with surgical cleanser. Once adequate anesthesia was obtained, the lesion was removed with a deep scallop shave procedure. The specimen was sent to pathology.    Direct pressure and aluminum chloride and monopolar cautery was applied for hemostasis. No bleeding was present upon the completion of the procedure. The wound was coated with antibacterial ointment. A dry sterile dressing was applied. Patient tolerated the procedure well and left in satisfactory condition.    Primary provider and  referring provider will be informed regarding the tissue report when it returns.      The information in this document, created by the medical scribe for me, accurately reflects the services I personally performed and the decisions made by me. I have reviewed and approved this document for accuracy prior to leaving the patient care area.  Katlyn Sultana MD June 18, 2018 7:46 AM  [facility] [place]    Again, thank you for allowing me to participate in the care of your patient.        Sincerely,        Garima Sultana MD

## 2018-06-18 NOTE — PATIENT INSTRUCTIONS
"FUTURE APPOINTMENTS    Follow up in 2 weeks for a 20 minute appointment for shave excisions of other lesions and pathology discussion.    Follow up every 6 months for a full-body skin cancer screening.    Follow up per pathology report.    WOUND CARE INSTRUCTIONS  1. Wash hands before every dressing change.  2. After 24 hours, change dressing daily.  3. Wash the wound area with a mild soap, then rinse.  4. Gently pat dry with a sterile gauze or Q-tip.  5. Using a Q-tip, apply Vaseline or Aquaphor only over entire wound. Do NOT use Neosporin - as many people react to neomycin.  6. Finally, cover with a bandage or sterile non-stick gauze with micropore paper tape.  7. Repeat once daily until wound has healed.      Soap, water and shampoo will not hurt this area.    Do not go swimming or take baths, but showering is encouraged.    Limit use of the area where the procedure was done for a few days to allow for optimal healing.    If you experience bleeding:  Wash hands and hold firm pressure on the area for 10 minutes without checking to see if the bleeding has stopped. \"Checking\" pulls off the protective wound clot and restarts the bleeding all over again. Re-apply pressure for 10 minutes if necessary to stop bleeding.  Use additional sterile gauze and tape to maintain pressure once bleeding has stopped.  If bleeding continues, then call back to clinic at [phone].    Signs of Infection:  Infection can occur in any area where skin has been disrupted.  If you notice persistent redness, swelling, colored drainage, increasing pain, fever or other signs of infection, please call us at: [phone] and ask to have me or my colleague paged. We will call you back to discuss.    Pathology Results:  You will be notified, generally via letter or MyChart, in approximately 10 days. If there is anything we need to discuss or further treatment needed, I will call you to discuss it.    PATIENT INFORMATION : WOUNDS  During the " healing process you will notice a number of changes. All wounds develop a small halo of redness surrounding the wound.  This means healing is occurring. Severe itching with extensive redness usually indicates sensitivity to the ointment or bandage tape used to dress the wound.  You should call our office if this develops.      Swelling  and/or discoloration around your surgical site is common, particularly when performed around the eye.    All wounds normally drain.  The larger the wound the more drainage there will be.  After 7-10 days, you will notice the wound beginning to shrink and new skin will begin to grow.  The wound is healed when you can see skin has formed over the entire area.  A healed wound has a healthy, shiny look to the surface and is red to dark pink in color to normalize.  Wounds may take approximately 4-6 weeks to heal.  Larger wounds may take 6-8 weeks. After the wound is healed you may discontinue dressing changes.    You may experience a sensation of tightness as your wound heals. This is normal and will gradually subside.    Your healed wound may be sensitive to temperature changes. This sensitivity improves with time, but if you re having a lot of discomfort, try to avoid temperature extremes.    Patients frequently experience itching after their wound appears to have healed because of the continue healing under the skin.  Plain Vaseline will help relieve the itching.    ACTINIC KERATOSES POST-TREATMENT CARE INSTRUCTIONS  Actinic keratoses are benign, scaly or gritty lesions that appear in sun-exposed areas and may progress to skin cancers. For this reason, it is important to treat them before they become cancerous. Liquid nitrogen is the most commonly used and most effective treatment for actinic keratoses; it is mildly uncomfortable when applied to the skin, but the discomfort rapidly subsides.    Post-Treatment:  You may experience burning and/or stinging immediately following the  procedure. The discomfort from the procedure may persist over the next 12-24 hours. The area treated will look pinker and slightly swollen before the healing process begins. You may also notice redness, swelling, tenderness, weeping and crusts or scabs. Healing time is approximately 10-14 days.    Blister - You may or may notice blistering from the freezing. If you develop an uncomfortable blister from today's treatment, you may gently puncture this with a needle that has been cleaned with alcohol. However, do not remove the protective skin layer of the blister.    Scab - After a few days, you may notice scaliness or scab formation. Do not pick at the scabs because this may cause slower healing and a permanent scar.    The skin may appear temporarily darker at the treatment site, but this usually fades over a period of months, provided that the area is protected from the sun.    Care of the areas treated:    Wash the area with a mild cleanser.    Gently pat dry.    Do not rub.     Keep protected from the sun during the healing process and for a full year following treatment as the skin continues to remodel during this time.    Apply Vaseline or Aquaphor ointment sparingly to the site for the first 7 days after treatment.    Do not use Neosporin, as many people eventually develop a medication allergy, that can easily be confused with an infection, to Neomycin.    Return if:  There should not be any residual scaling. If there is any concern that the lesion has persisted after 4-6 weeks, make an appointment for a re-check. Healing time does vary depending on your individual healing process and the area of the body treated. Most patients will be healed in one month.    Signs of Infection:  Thankfully this is rare. However if you notice persistent colored drainage, increasing pain, fever or other signs of infection, please call us at: [phone]    SUN PROTECTION INSTRUCTIONS  Sun damage can lead to skin cancer and  "premature aging of the skin.      The best way to protect from sun damage to your skin is to avoid the sun during peak hours (10 am - 2 pm) even on overcast days.      Use UPF sun-protective clothing, which while more expensive initially provides longer lasting coverage without having to worry about remembering to re-apply.  1. Wear a wide-brimmed hat and sunglasses.   2. Wear sun-protective clothing.  [employer] and other [employer] make sun protective clothing that are stylish, comfortable and cool. [employer] and other [employer] make UV arm sleeves suitable for golfing, gardening and other activities.      Sunscreen instructions:  1. Use sunscreens with Zinc Oxide, Titanium Dioxide or Avobenzone to protect from UVA rays.  2. Use SPF 30-50+ to protect from UVB rays.  3. Re-apply every 2 hours even if water resistant.  4. Apply on your face every day even when cloudy and even in the winter. UVA \"aging rays\" penetrate window glass and are just as strong in the winter as in the summer.    Product Recommendations:    Good examples include: Blue Lizard, EltaMD, Solbar    Good daily moisturizers with SPF: Vanicream, CeraVe.    For sensitive skin, consider : Skin[employer] Essential Defense Mineral Shield Broad Spectrum SPF 35      Never use tanning beds. Tanning beds are associated with much higher risks of skin cancer.    All tanning damages the skin. Aim for ivory skin year round and you will have less trouble with your skin in years to come. There is no merit in getting \"a base tan\" before a warm weather vacation, as any tanning indicates your body's response to sun damage.    Stop smoking. Smokers have higher rates of skin cancer and also have premature skin wrinkling.    FYI  You should use about 3 tablespoons of sunscreen to protect your whole body. Thus a typical eight ounce bottle of sunscreen should last 4 applications. Remember, that the SPF rating is compromised if you don t apply enough. Most people " only apply 1/2 - 1/3 of the amount they need. Also don t forget areas such as your ears, feet, upper back and harder to reach places. Keep in mind that these amounts should be increased for larger body sizes.    Sunscreens with titanium dioxide and/or zinc oxide in the active ingredients are physical blockers as opposed to chemical blockers. Chemical-free sunscreens should not irritate the skin.    Spray-on sunscreens may be used for touch-up application only, not as a base layer. Also, use with caution around small children due to inhalation risk.    Avoid retinyl palmitate products.    Avoid combination products that include both sunscreen and insect repellant, as sunscreen should be applied every 2 hours, but insect repellant should not be applied as frequently.    SPF means sun protection factor, which is just the degree to which the sunscreen can protect against UVB rays. There is no rating system for UVA rays. SPF is calculated as the time skin will burn when sunscreen is applied vs. skin without sunscreen.    Water resistant sunscreens should be re-applied every 1-2 hours.    For more information:  http://www.skincancer.org/prevention/sun-protection/sunscreen/sunscreens-safe-and-effective    SKIN CANCER SELF-EXAM INSTRUCTIONS  Check every month in the mirror or with a household member. Be aware of any changes, especially bleeding or tenderness. Also, make sure to check your nails for color changes after removal of nail polish.    For melanoma, check for:  A - Asymmetry. One half unlike the other half.  B - Border. Irregular, scalloped, ragged, notched, blurred or poorly defined borders.  C - Color. Color variations from one area to another, with shades of tan, brown and/or black present. Sometimes white, red or blue.  D - Diameter. Greater than 6 mm (about the size of a pencil eraser). Any new growth of a mole should be concerning and be evaluated.  E - Evolving. A mole or skin lesion that looks different  from the rest or is changing in size, shape or color.    For basal cell carcinoma and squamous cell carcinoma, check for:    Sores, shiny bumps, nodules, scaly lesions, or wart-like growths that are itchy, tender, crusting, scabbing, eroding, oozing or bleeding.    Open sores/wounds or reddish/irritated areas that do not heal within 2-3 weeks.    Scar-like areas that are white, yellow or waxy in color.

## 2018-06-19 LAB — COPATH REPORT: NORMAL

## 2018-07-02 ENCOUNTER — OFFICE VISIT (OUTPATIENT)
Dept: FAMILY MEDICINE | Facility: CLINIC | Age: 63
End: 2018-07-02
Payer: COMMERCIAL

## 2018-07-02 VITALS — SYSTOLIC BLOOD PRESSURE: 132 MMHG | DIASTOLIC BLOOD PRESSURE: 83 MMHG | HEART RATE: 76 BPM

## 2018-07-02 DIAGNOSIS — Z85.828 HISTORY OF BASAL CELL CARCINOMA: Primary | ICD-10-CM

## 2018-07-02 DIAGNOSIS — C44.91 SUPERFICIAL BASAL CELL CARCINOMA: ICD-10-CM

## 2018-07-02 DIAGNOSIS — D48.5 NEOPLASM OF UNCERTAIN BEHAVIOR OF SKIN: ICD-10-CM

## 2018-07-02 DIAGNOSIS — Z86.007 HISTORY OF SQUAMOUS CELL CARCINOMA IN SITU OF SKIN: ICD-10-CM

## 2018-07-02 DIAGNOSIS — D09.9 SQUAMOUS CELL CARCINOMA IN SITU: ICD-10-CM

## 2018-07-02 PROCEDURE — 99213 OFFICE O/P EST LOW 20 MIN: CPT | Mod: 25 | Performed by: FAMILY MEDICINE

## 2018-07-02 PROCEDURE — 11301 SHAVE SKIN LESION 0.6-1.0 CM: CPT | Performed by: FAMILY MEDICINE

## 2018-07-02 PROCEDURE — 88305 TISSUE EXAM BY PATHOLOGIST: CPT | Mod: TC | Performed by: FAMILY MEDICINE

## 2018-07-02 RX ORDER — FLUOROURACIL 50 MG/G
CREAM TOPICAL
Qty: 40 G | Refills: 3 | Status: SHIPPED | OUTPATIENT
Start: 2018-07-02 | End: 2018-07-23

## 2018-07-02 NOTE — PATIENT INSTRUCTIONS
FUTURE APPOINTMENTS    Follow up for a 40 minute appointment for wide excision of nodular basal cell carcinoma on the right upper chest. Stitches will have to be in place for 2 weeks with corresponding modification of activity.    Follow up every 3 month(s) for a full-body skin cancer screening.    TOPICAL MEDICATION  Begin treatment in September 2018.   Apply fluorouracil (Efudex) 5% cream (comes as a 40 g tube).  Rotate treatment twice daily for 2 consecutive weeks to each extremity, to chest, to back.      Apply to entire limb or side of trunk.    Use a Q tip for application.    Make sure to wash your hands after application.    The medication is used to treat abnormal cells, so you will develop irritation at the affected areas. It reacts with abnormal skin cells but avoids normal skin cells.    However, stop applying the cream if the area becomes excessively irritated (i.e. painful or weepy).    If you notice excessive irritation, call us at [phone] and ask to have me or my colleague paged. We will call you back to discuss.    Consider application of Vaseline only for skin irritation when applying this medication.    WOUND CARE INSTRUCTIONS  1. Wash hands before every dressing change.  2. After 24 hours, change dressing daily.  3. Wash the wound area with a mild soap, then rinse.  4. Gently pat dry with a sterile gauze or Q-tip.  5. Using a Q-tip, apply Vaseline or Aquaphor only over entire wound. Do NOT use Neosporin - as many people react to neomycin.  6. Finally, cover with a bandage or sterile non-stick gauze with micropore paper tape.  7. Repeat once daily until wound has healed.      Soap, water and shampoo will not hurt this area.    Do not go swimming or take baths, but showering is encouraged.    Limit use of the area where the procedure was done for a few days to allow for optimal healing.    If you experience bleeding:  Wash hands and hold firm pressure on the area for 10 minutes without  "checking to see if the bleeding has stopped. \"Checking\" pulls off the protective wound clot and restarts the bleeding all over again. Re-apply pressure for 10 minutes if necessary to stop bleeding.  Use additional sterile gauze and tape to maintain pressure once bleeding has stopped.  If bleeding continues, then call back to clinic at [phone].    Signs of Infection:  Infection can occur in any area where skin has been disrupted.  If you notice persistent redness, swelling, colored drainage, increasing pain, fever or other signs of infection, please call us at: [phone] and ask to have me or my colleague paged. We will call you back to discuss.    Pathology Results:  You will be notified, generally via letter or MyChart, in approximately 10 days. If there is anything we need to discuss or further treatment needed, I will call you to discuss it.    PATIENT INFORMATION : WOUNDS  During the healing process you will notice a number of changes. All wounds develop a small halo of redness surrounding the wound.  This means healing is occurring. Severe itching with extensive redness usually indicates sensitivity to the ointment or bandage tape used to dress the wound.  You should call our office if this develops.      Swelling  and/or discoloration around your surgical site is common, particularly when performed around the eye.    All wounds normally drain.  The larger the wound the more drainage there will be.  After 7-10 days, you will notice the wound beginning to shrink and new skin will begin to grow.  The wound is healed when you can see skin has formed over the entire area.  A healed wound has a healthy, shiny look to the surface and is red to dark pink in color to normalize.  Wounds may take approximately 4-6 weeks to heal.  Larger wounds may take 6-8 weeks. After the wound is healed you may discontinue dressing changes.    You may experience a sensation of tightness as your wound heals. This is normal " "and will gradually subside.    Your healed wound may be sensitive to temperature changes. This sensitivity improves with time, but if you re having a lot of discomfort, try to avoid temperature extremes.    Patients frequently experience itching after their wound appears to have healed because of the continue healing under the skin.  Plain Vaseline will help relieve the itching.    SUN PROTECTION INSTRUCTIONS  Sun damage can lead to skin cancer and premature aging of the skin.      The best way to protect from sun damage to your skin is to avoid the sun during peak hours (10 am - 2 pm) even on overcast days.      Use UPF sun-protective clothing, which while more expensive initially provides longer lasting coverage without having to worry about remembering to re-apply.  1. Wear a wide-brimmed hat and sunglasses.   2. Wear sun-protective clothing.  [employer] and other [employer] make sun protective clothing that are stylish, comfortable and cool. [email or web address] and other [employer] make UV arm sleeves suitable for golfing, gardening and other activities.      Sunscreen instructions:  1. Use sunscreens with Zinc Oxide, Titanium Dioxide or Avobenzone to protect from UVA rays.  2. Use SPF 30-50+ to protect from UVB rays.  3. Re-apply every 2 hours even if water resistant.  4. Apply on your face every day even when cloudy and even in the winter. UVA \"aging rays\" penetrate window glass and are just as strong in the winter as in the summer.    Product Recommendations:    Good examples include: Blue Lizard, EltaMD, Solbar    Good daily moisturizers with SPF: Vanicream, CeraVe.    For sensitive skin, consider : Skin[employer] Essential Defense Mineral Shield Broad Spectrum SPF 35      Never use tanning beds. Tanning beds are associated with much higher risks of skin cancer.    All tanning damages the skin. Aim for ivory skin year round and you will have less trouble with your skin in years to come. There is no merit in " "getting \"a base tan\" before a warm weather vacation, as any tanning indicates your body's response to sun damage.    Stop smoking. Smokers have higher rates of skin cancer and also have premature skin wrinkling.    FYI  You should use about 3 tablespoons of sunscreen to protect your whole body. Thus a typical eight ounce bottle of sunscreen should last 4 applications. Remember, that the SPF rating is compromised if you don t apply enough. Most people only apply 1/2 - 1/3 of the amount they need. Also don t forget areas such as your ears, feet, upper back and harder to reach places. Keep in mind that these amounts should be increased for larger body sizes.    Sunscreens with titanium dioxide and/or zinc oxide in the active ingredients are physical blockers as opposed to chemical blockers. Chemical-free sunscreens should not irritate the skin.    Spray-on sunscreens may be used for touch-up application only, not as a base layer. Also, use with caution around small children due to inhalation risk.    Avoid retinyl palmitate products.    Avoid combination products that include both sunscreen and insect repellant, as sunscreen should be applied every 2 hours, but insect repellant should not be applied as frequently.    SPF means sun protection factor, which is just the degree to which the sunscreen can protect against UVB rays. There is no rating system for UVA rays. SPF is calculated as the time skin will burn when sunscreen is applied vs. skin without sunscreen.    Water resistant sunscreens should be re-applied every 1-2 hours.    For more information:  http://www.skincancer.org/prevention/sun-protection/sunscreen/sunscreens-safe-and-effective    SKIN CANCER SELF-EXAM INSTRUCTIONS  Check every month in the mirror or with a household member. Be aware of any changes, especially bleeding or tenderness. Also, make sure to check your nails for color changes after removal of nail polish.    For melanoma, check for:  A - " Asymmetry. One half unlike the other half.  B - Border. Irregular, scalloped, ragged, notched, blurred or poorly defined borders.  C - Color. Color variations from one area to another, with shades of tan, brown and/or black present. Sometimes white, red or blue.  D - Diameter. Greater than 6 mm (about the size of a pencil eraser). Any new growth of a mole should be concerning and be evaluated.  E - Evolving. A mole or skin lesion that looks different from the rest or is changing in size, shape or color.    For basal cell carcinoma and squamous cell carcinoma, check for:    Sores, shiny bumps, nodules, scaly lesions, or wart-like growths that are itchy, tender, crusting, scabbing, eroding, oozing or bleeding.    Open sores/wounds or reddish/irritated areas that do not heal within 2-3 weeks.    Scar-like areas that are white, yellow or waxy in color.

## 2018-07-02 NOTE — LETTER
7/2/2018         RE: Jacqueline Banegas  [address]  [place] [address]        Dear Colleague,    Thank you for referring your patient, [place] [facility] [place] see a copy of my visit note below.    [facility] - PRIMARY CARE SKIN    CC : basal cell carcinoma, squamous cell carcinoma in situ, lesion(s)  SUBJECTIVE:                                                    Jacqueline Banegas is a 63 year old female who presents to clinic today for follow-up of pathology confirmed skin cancers. Five previous shave procedures on 6/18/2018 have returned as basal cell carcinoma or squamous cell carcinoma in situ. Seven other lesions have been noticed but not yet shaved.    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 ? 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  Autoimmune : NO    Ancestry : [place], English, [place], [place]     Sun Exposure History  Previous history of significant sun exposure:  Blistering sunburns : YES  Tanning beds : YES - when younger.  Sunscreen Use : YES, SPF : 50.  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    Refer to electronic medical record (EMR) for past medical history and medications.    INTEGUMENTARY/SKIN: POSITIVE for non-healing lesions  ROS : 14 point review of systems was negative except the symptoms listed above in the HPI.    This document serves as a record of the services and decisions personally performed and made by Katlyn Sultana MD. It was created on her behalf by Nicho Garay, a trained medical scribe.  The creation of this document is based on the scribe's personal observations and the provider's statements to the medical scribe.  Nicho Garay, July 2, 2018 7:46 AM      OBJECTIVE:                                                    GENERAL: healthy, alert and no distress  SKIN: Choudhary Skin Type - I.  This patient was examined from the top of the head to the bottom of the feet  including scalp, face, neck, back, chest, breasts, buttocks, both arms, both legs, both hands, both feet, all 10 fingers and all 10 toes. The dermatoscope was used to help evaluate pigmented lesions.  Skin Pertinent Findings:  All previous shave sites are healing well.    Right lower leg, inferior to medial malleolus : 6 x 4 mm in size, scaly erythematous, indurated lesion. ? Actinic keratosis ? Squamous cell carcinoma ? Other. Patient elects for 5-fu treatment.    Right medial mid-calf, 15 cm superior to medial malleolus : 4 mm in size, scaly, indurated lesion. ? Actinic keratosis ? Squamous cell carcinoma ? Other. Patient elects for 5-fu treatment.    Right medial calf, 6 cm superior to medial malleolus : 7 x 5 mm in size, scaly, erythematous, indurated lesion. ? Actinic keratosis ? Squamous cell carcinoma ? Other. Patient elects for 5-fu treatment.      Mid-back, at T4 : 6 x 4 mm in size, scaly, erythematous lesion. ? Actinic keratosis ? Squamous cell carcinoma ? Other. Patient elects for 5-fu treatment.      Right  lateral back, mid-posterior axillary line : 6 x 3 mm in size, erythematous, indurated, scaly lesion. ? Actinic keratosis ? Squamous cell carcinoma ? Other. Patient elects for 5-fu treatment.    Back, 12 cm right of midline, at T7 : 4 mm in size, erythematous, scaly, indurated lesion. ? Actinic keratosis ? Squamous cell carcinoma ? Other. Patient elects for 5-fu treatment.      Left lateral posterior leg, 2 cm inferior to flexor crease : 7 x 5 mm in size, indurated, scaly, erythematous lesion. ? Basal cell carcinoma ? Squamous cell carcinoma ? Other. Shave procedure today.      MDM : Discussion regarding ED&C.,topical 5 FU treatment ( field treatments ) , excision for sure for the nodular basal cell carcinoma . Recurrence rate with each option.   This is a female with multiple superficial basal cell carcinoma and squamous cell carcinoma in situ recommended q 3month skin exams.      ASSESSMENT:                                                      Encounter Diagnoses   Name Primary?     History of basal cell carcinoma Yes     History of squamous cell carcinoma in situ of skin      Neoplasm of uncertain behavior of skin          PLAN:                                                    Patient Instructions   FUTURE APPOINTMENTS    Follow up for a 40 minute appointment for wide excision of nodular basal cell carcinoma on the right upper chest. Stitches will have to be in place for 2 weeks with corresponding modification of activity.    Follow up every 3 month(s) for a full-body skin cancer screening.    TOPICAL MEDICATION  Begin treatment in September 2018.   Apply fluorouracil (Efudex) 5% cream (comes as a 40 g tube).  Rotate treatment twice daily for 2 consecutive weeks to each extremity, to chest, to back.      Apply to entire limb or side of trunk.    Use a Q tip for application.    Make sure to wash your hands after application.    The medication is used to treat abnormal cells, so you will develop irritation at the  "affected areas. It reacts with abnormal skin cells but avoids normal skin cells.    However, stop applying the cream if the area becomes excessively irritated (i.e. painful or weepy).    If you notice excessive irritation, call us at [phone] and ask to have me or my colleague paged. We will call you back to discuss.    Consider application of Vaseline only for skin irritation when applying this medication.    WOUND CARE INSTRUCTIONS  1. Wash hands before every dressing change.  2. After 24 hours, change dressing daily.  3. Wash the wound area with a mild soap, then rinse.  4. Gently pat dry with a sterile gauze or Q-tip.  5. Using a Q-tip, apply Vaseline or Aquaphor only over entire wound. Do NOT use Neosporin - as many people react to neomycin.  6. Finally, cover with a bandage or sterile non-stick gauze with micropore paper tape.  7. Repeat once daily until wound has healed.      Soap, water and shampoo will not hurt this area.    Do not go swimming or take baths, but showering is encouraged.    Limit use of the area where the procedure was done for a few days to allow for optimal healing.    If you experience bleeding:  Wash hands and hold firm pressure on the area for 10 minutes without checking to see if the bleeding has stopped. \"Checking\" pulls off the protective wound clot and restarts the bleeding all over again. Re-apply pressure for 10 minutes if necessary to stop bleeding.  Use additional sterile gauze and tape to maintain pressure once bleeding has stopped.  If bleeding continues, then call back to clinic at [phone].    Signs of Infection:  Infection can occur in any area where skin has been disrupted.  If you notice persistent redness, swelling, colored drainage, increasing pain, fever or other signs of infection, please call us at: [phone] and ask to have me or my colleague paged. We will call you back to discuss.    Pathology Results:  You will be notified, generally via letter " or MyChart, in approximately 10 days. If there is anything we need to discuss or further treatment needed, I will call you to discuss it.    PATIENT INFORMATION : WOUNDS  During the healing process you will notice a number of changes. All wounds develop a small halo of redness surrounding the wound.  This means healing is occurring. Severe itching with extensive redness usually indicates sensitivity to the ointment or bandage tape used to dress the wound.  You should call our office if this develops.      Swelling  and/or discoloration around your surgical site is common, particularly when performed around the eye.    All wounds normally drain.  The larger the wound the more drainage there will be.  After 7-10 days, you will notice the wound beginning to shrink and new skin will begin to grow.  The wound is healed when you can see skin has formed over the entire area.  A healed wound has a healthy, shiny look to the surface and is red to dark pink in color to normalize.  Wounds may take approximately 4-6 weeks to heal.  Larger wounds may take 6-8 weeks. After the wound is healed you may discontinue dressing changes.    You may experience a sensation of tightness as your wound heals. This is normal and will gradually subside.    Your healed wound may be sensitive to temperature changes. This sensitivity improves with time, but if you re having a lot of discomfort, try to avoid temperature extremes.    Patients frequently experience itching after their wound appears to have healed because of the continue healing under the skin.  Plain Vaseline will help relieve the itching.    SUN PROTECTION INSTRUCTIONS  Sun damage can lead to skin cancer and premature aging of the skin.      The best way to protect from sun damage to your skin is to avoid the sun during peak hours (10 am - 2 pm) even on overcast days.      Use UPF sun-protective clothing, which while more expensive initially provides longer lasting coverage without  "having to worry about remembering to re-apply.  1. Wear a wide-brimmed hat and sunglasses.   2. Wear sun-protective clothing.  [employer] and other companies make sun protective clothing that are stylish, comfortable and cool. [employer] and other companies make UV arm sleeves suitable for golfing, gardening and other activities.      Sunscreen instructions:  1. Use sunscreens with Zinc Oxide, Titanium Dioxide or Avobenzone to protect from UVA rays.  2. Use SPF 30-50+ to protect from UVB rays.  3. Re-apply every 2 hours even if water resistant.  4. Apply on your face every day even when cloudy and even in the winter. UVA \"aging rays\" penetrate window glass and are just as strong in the winter as in the summer.    Product Recommendations:    Good examples include: Blue Lizard, EltaMD, Solbar    Good daily moisturizers with SPF: Vanicream, CeraVe.    For sensitive skin, consider : SkinMedica Essential Defense Mineral Shield Broad Spectrum SPF 35      Never use tanning beds. Tanning beds are associated with much higher risks of skin cancer.    All tanning damages the skin. Aim for ivory skin year round and you will have less trouble with your skin in years to come. There is no merit in getting \"a base tan\" before a warm weather vacation, as any tanning indicates your body's response to sun damage.    Stop smoking. Smokers have higher rates of skin cancer and also have premature skin wrinkling.    FYI  You should use about 3 tablespoons of sunscreen to protect your whole body. Thus a typical eight ounce bottle of sunscreen should last 4 applications. Remember, that the SPF rating is compromised if you don t apply enough. Most people only apply 1/2 - 1/3 of the amount they need. Also don t forget areas such as your ears, feet, upper back and harder to reach places. Keep in mind that these amounts should be increased for larger body sizes.    Sunscreens with titanium dioxide and/or zinc oxide in the active " ingredients are physical blockers as opposed to chemical blockers. Chemical-free sunscreens should not irritate the skin.    Spray-on sunscreens may be used for touch-up application only, not as a base layer. Also, use with caution around small children due to inhalation risk.    Avoid retinyl palmitate products.    Avoid combination products that include both sunscreen and insect repellant, as sunscreen should be applied every 2 hours, but insect repellant should not be applied as frequently.    SPF means sun protection factor, which is just the degree to which the sunscreen can protect against UVB rays. There is no rating system for UVA rays. SPF is calculated as the time skin will burn when sunscreen is applied vs. skin without sunscreen.    Water resistant sunscreens should be re-applied every 1-2 hours.    For more information:  http://www.skincancer.org/prevention/sun-protection/sunscreen/sunscreens-safe-and-effective    SKIN CANCER SELF-EXAM INSTRUCTIONS  Check every month in the mirror or with a household member. Be aware of any changes, especially bleeding or tenderness. Also, make sure to check your nails for color changes after removal of nail polish.    For melanoma, check for:  A - Asymmetry. One half unlike the other half.  B - Border. Irregular, scalloped, ragged, notched, blurred or poorly defined borders.  C - Color. Color variations from one area to another, with shades of tan, brown and/or black present. Sometimes white, red or blue.  D - Diameter. Greater than 6 mm (about the size of a pencil eraser). Any new growth of a mole should be concerning and be evaluated.  E - Evolving. A mole or skin lesion that looks different from the rest or is changing in size, shape or color.    For basal cell carcinoma and squamous cell carcinoma, check for:    Sores, shiny bumps, nodules, scaly lesions, or wart-like growths that are itchy, tender, crusting, scabbing, eroding, oozing or bleeding.    Open  sores/wounds or reddish/irritated areas that do not heal within 2-3 weeks.    Scar-like areas that are white, yellow or waxy in color.    The patient was counseled about sunscreens and sun avoidance. The patient was counseled to check the skin regularly and report any lesion that is new, changing, itching, scabbing, bleeding or otherwise bothersome. The patient was discharged ambulatory and in stable condition.      PROCEDURES:                                                    Name : Shave Excision  Indication : Excision of tissue for pathology evaluation.  Location(s) : Left lateral posterior leg, 2 cm inferior to flexor crease : 7 x 5 mm in size, indurated, scaly, erythematous lesion. ? Basal cell carcinoma ? Squamous cell carcinoma ? Other. Patient to return for shave excision.  Completed by : Katlyn Sultana MD  Photo Taken : yes in 6/18/18 office note.  Anesthesia : Patient was anesthetized by infiltrating the area surrounding the lesion with 1% lidocaine.   epinephrine [id number] : Yes.  Buffered with bicarbonate : Yes.  Note : Discussed the risk of pain, infection, scarring, hypo- or hyperpigmentation and recurrence or need for re-treatment. The benefits of treatment and alternative treatments were also discussed.    During this procedure, the universal protocol was utilized. The patient's identity was confirmed by no less than two patient identifiers, correct procedure was verified, correct site was verified and marked as applicable and a final pause was completed.    Sterile technique was used throughout the procedure. The skin was cleaned and prepped with surgical cleanser. Once adequate anesthesia was obtained, the lesion was removed with a deep scallop shave procedure. The specimen was sent to pathology.    Direct pressure and aluminum chloride and monopolar cautery was applied for hemostasis. No bleeding was present upon the completion of the procedure. The wound was coated with antibacterial ointment. A  dry sterile dressing was applied. Patient tolerated the procedure well and left in satisfactory condition.    Primary provider and referring provider will be informed regarding the tissue report when it returns.      The information in this document, created by the medical scribe for me, accurately reflects the services I personally performed and the decisions made by me. I have reviewed and approved this document for accuracy prior to leaving the patient care area.  Katlyn Sultana MD July 2, 2018 7:45 AM  [facility] [place]    Again, thank you for allowing me to participate in the care of your patient.        Sincerely,        Garima Sultana MD

## 2018-07-02 NOTE — PROGRESS NOTES
[facility] - PRIMARY CARE SKIN    CC : basal cell carcinoma, squamous cell carcinoma in situ, lesion(s)  SUBJECTIVE:                                                    Jacqueline Banegas is a 63 year old female who presents to clinic today for follow-up of pathology confirmed skin cancers. Five previous shave procedures on 6/18/2018 have returned as basal cell carcinoma or squamous cell carcinoma in situ. Seven other lesions have been noticed but not yet shaved.    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 ? 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  Autoimmune : NO    Ancestry : Greek, English, [place], [place]     Sun Exposure History  Previous history of significant sun exposure:  Blistering sunburns : YES  Tanning beds : YES - when younger.  Sunscreen Use : YES, SPF : 50.  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    Refer to electronic medical record (EMR) for past medical history and medications.    INTEGUMENTARY/SKIN: POSITIVE for non-healing  lesions  ROS : 14 point review of systems was negative except the symptoms listed above in the HPI.    This document serves as a record of the services and decisions personally performed and made by Katlyn Sultana MD. It was created on her behalf by Nicho Garay, a trained medical scribe.  The creation of this document is based on the scribe's personal observations and the provider's statements to the medical scribe.  Nicho Garay, July 2, 2018 7:46 AM      OBJECTIVE:                                                    GENERAL: healthy, alert and no distress  SKIN: Choudhary Skin Type - I.  This patient was examined from the top of the head to the bottom of the feet  including scalp, face, neck, back, chest, breasts, buttocks, both arms, both legs, both hands, both feet, all 10 fingers and all 10 toes. The dermatoscope was used to help evaluate pigmented lesions.  Skin Pertinent Findings:  All previous shave sites are healing well.    Right lower leg, inferior to medial malleolus : 6 x 4 mm in size, scaly erythematous, indurated lesion. ? Actinic keratosis ? Squamous cell carcinoma ? Other. Patient elects for 5-fu treatment.    Right medial mid-calf, 15 cm superior to medial malleolus : 4 mm in size, scaly, indurated lesion. ? Actinic keratosis ? Squamous cell carcinoma ? Other. Patient elects for 5-fu treatment.    Right medial calf, 6 cm superior to medial malleolus : 7 x 5 mm in size, scaly, erythematous, indurated lesion. ? Actinic keratosis ? Squamous cell carcinoma ? Other. Patient elects for 5-fu treatment.      Mid-back, at T4 : 6 x 4 mm in size, scaly, erythematous lesion. ? Actinic keratosis ? Squamous cell carcinoma ? Other. Patient elects for 5-fu treatment.      Right lateral back, mid-posterior axillary line : 6 x 3 mm in size, erythematous, indurated, scaly lesion. ? Actinic keratosis ? Squamous cell carcinoma ? Other. Patient elects for 5-fu treatment.    Back, 12 cm right of midline, at T7 : 4 mm in  size, erythematous, scaly, indurated lesion. ? Actinic keratosis ? Squamous cell carcinoma ? Other. Patient elects for 5-fu treatment.      Left lateral posterior leg, 2 cm inferior to flexor crease : 7 x 5 mm in size, indurated, scaly, erythematous lesion. ? Basal cell carcinoma ? Squamous cell carcinoma ? Other. Shave procedure today.      MDM : Discussion regarding ED&C.,topical 5 FU treatment ( field treatments ) , excision for sure for the nodular basal cell carcinoma . Recurrence rate with each option.   This is a female with multiple superficial basal cell carcinoma and squamous cell carcinoma in situ recommended q 3month skin exams.      ASSESSMENT:                                                      Encounter Diagnoses   Name Primary?     History of basal cell carcinoma Yes     History of squamous cell carcinoma in situ of skin      Neoplasm of uncertain behavior of skin          PLAN:                                                    Patient Instructions   FUTURE APPOINTMENTS    Follow up for a 40 minute appointment for wide excision of nodular basal cell carcinoma on the right upper chest. Stitches will have to be in place for 2 weeks with corresponding modification of activity.    Follow up every 3 month(s) for a full-body skin cancer screening.    TOPICAL MEDICATION  Begin treatment in September 2018.   Apply fluorouracil (Efudex) 5% cream (comes as a 40 g tube).  Rotate treatment twice daily for 2 consecutive weeks to each extremity, to chest, to back.      Apply to entire limb or side of trunk.    Use a Q tip for application.    Make sure to wash your hands after application.    The medication is used to treat abnormal cells, so you will develop irritation at the affected areas. It reacts with abnormal skin cells but avoids normal skin cells.    However, stop applying the cream if the area becomes excessively irritated (i.e. painful or weepy).    If you notice excessive irritation, call us at [phone]  [phone] and ask to have me or my colleague paged. We will call you back to discuss.    Consider application of Vaseline only for skin irritation when applying this medication.    WOUND CARE INSTRUCTIONS  1. Wash hands before every dressing change.  2. After 24 hours, change dressing daily.  3. Wash the wound area with a mild soap, then rinse.  4. Gently pat dry with a sterile gauze or Q-tip.  5. Using a Q-tip, apply Vaseline or Aquaphor only over entire wound. Do NOT use Neosporin - as many people react to neomycin.  6. Finally, cover with a bandage or sterile non-stick gauze with micropore paper tape.  7. Repeat once daily until wound has healed.      Soap, water and shampoo will not hurt this area.    Do not go swimming or take baths, but showering is encouraged.    Limit use of the area where the procedure was done for a few days to allow for optimal healing.    If you experience bleeding:  Wash hands and hold firm pressure on the area for 10 minutes without checking to see if the bleeding has stopped. \"Checking\" pulls off the protective wound clot and restarts the bleeding all over again. Re-apply pressure for 10 minutes if necessary to stop bleeding.  Use additional sterile gauze and tape to maintain pressure once bleeding has stopped.  If bleeding continues, then call back to clinic at [phone].    Signs of Infection:  Infection can occur in any area where skin has been disrupted.  If you notice persistent redness, swelling, colored drainage, increasing pain, fever or other signs of infection, please call us at: [phone] and ask to have me or my colleague paged. We will call you back to discuss.    Pathology Results:  You will be notified, generally via letter or MyChart, in approximately 10 days. If there is anything we need to discuss or further treatment needed, I will call you to discuss it.    PATIENT INFORMATION : WOUNDS  During the healing process you will notice a number of changes. All " wounds develop a small halo of redness surrounding the wound.  This means healing is occurring. Severe itching with extensive redness usually indicates sensitivity to the ointment or bandage tape used to dress the wound.  You should call our office if this develops.      Swelling  and/or discoloration around your surgical site is common, particularly when performed around the eye.    All wounds normally drain.  The larger the wound the more drainage there will be.  After 7-10 days, you will notice the wound beginning to shrink and new skin will begin to grow.  The wound is healed when you can see skin has formed over the entire area.  A healed wound has a healthy, shiny look to the surface and is red to dark pink in color to normalize.  Wounds may take approximately 4-6 weeks to heal.  Larger wounds may take 6-8 weeks. After the wound is healed you may discontinue dressing changes.    You may experience a sensation of tightness as your wound heals. This is normal and will gradually subside.    Your healed wound may be sensitive to temperature changes. This sensitivity improves with time, but if you re having a lot of discomfort, try to avoid temperature extremes.    Patients frequently experience itching after their wound appears to have healed because of the continue healing under the skin.  Plain Vaseline will help relieve the itching.    SUN PROTECTION INSTRUCTIONS  Sun damage can lead to skin cancer and premature aging of the skin.      The best way to protect from sun damage to your skin is to avoid the sun during peak hours (10 am - 2 pm) even on overcast days.      Use UPF sun-protective clothing, which while more expensive initially provides longer lasting coverage without having to worry about remembering to re-apply.  1. Wear a wide-brimmed hat and sunglasses.   2. Wear sun-protective clothing.  CrossCore and other [employer] make sun protective clothing that are stylish, comfortable and cool.  "Car[employer]en and other companies make UV arm sleeves suitable for golfing, gardening and other activities.      Sunscreen instructions:  1. Use sunscreens with Zinc Oxide, Titanium Dioxide or Avobenzone to protect from UVA rays.  2. Use SPF 30-50+ to protect from UVB rays.  3. Re-apply every 2 hours even if water resistant.  4. Apply on your face every day even when cloudy and even in the winter. UVA \"aging rays\" penetrate window glass and are just as strong in the winter as in the summer.    Product Recommendations:    Good examples include: Blue Lizard, EltaMD, Solbar    Good daily moisturizers with SPF: Vanicream, CeraVe.    For sensitive skin, consider : SkinMedica Essential Defense Mineral Shield Broad Spectrum SPF 35      Never use tanning beds. Tanning beds are associated with much higher risks of skin cancer.    All tanning damages the skin. Aim for ivory skin year round and you will have less trouble with your skin in years to come. There is no merit in getting \"a base tan\" before a warm weather vacation, as any tanning indicates your body's response to sun damage.    Stop smoking. Smokers have higher rates of skin cancer and also have premature skin wrinkling.    FYI  You should use about 3 tablespoons of sunscreen to protect your whole body. Thus a typical eight ounce bottle of sunscreen should last 4 applications. Remember, that the SPF rating is compromised if you don t apply enough. Most people only apply 1/2 - 1/3 of the amount they need. Also don t forget areas such as your ears, feet, upper back and harder to reach places. Keep in mind that these amounts should be increased for larger body sizes.    Sunscreens with titanium dioxide and/or zinc oxide in the active ingredients are physical blockers as opposed to chemical blockers. Chemical-free sunscreens should not irritate the skin.    Spray-on sunscreens may be used for touch-up application only, not as a base layer. Also, use with caution around " small children due to inhalation risk.    Avoid retinyl palmitate products.    Avoid combination products that include both sunscreen and insect repellant, as sunscreen should be applied every 2 hours, but insect repellant should not be applied as frequently.    SPF means sun protection factor, which is just the degree to which the sunscreen can protect against UVB rays. There is no rating system for UVA rays. SPF is calculated as the time skin will burn when sunscreen is applied vs. skin without sunscreen.    Water resistant sunscreens should be re-applied every 1-2 hours.    For more information:  http://www.skincancer.org/prevention/sun-protection/sunscreen/sunscreens-safe-and-effective    SKIN CANCER SELF-EXAM INSTRUCTIONS  Check every month in the mirror or with a household member. Be aware of any changes, especially bleeding or tenderness. Also, make sure to check your nails for color changes after removal of nail polish.    For melanoma, check for:  A - Asymmetry. One half unlike the other half.  B - Border. Irregular, scalloped, ragged, notched, blurred or poorly defined borders.  C - Color. Color variations from one area to another, with shades of tan, brown and/or black present. Sometimes white, red or blue.  D - Diameter. Greater than 6 mm (about the size of a pencil eraser). Any new growth of a mole should be concerning and be evaluated.  E - Evolving. A mole or skin lesion that looks different from the rest or is changing in size, shape or color.    For basal cell carcinoma and squamous cell carcinoma, check for:    Sores, shiny bumps, nodules, scaly lesions, or wart-like growths that are itchy, tender, crusting, scabbing, eroding, oozing or bleeding.    Open sores/wounds or reddish/irritated areas that do not heal within 2-3 weeks.    Scar-like areas that are white, yellow or waxy in color.    The patient was counseled about sunscreens and sun avoidance. The patient was counseled to check the skin  regularly and report any lesion that is new, changing, itching, scabbing, bleeding or otherwise bothersome. The patient was discharged ambulatory and in stable condition.      PROCEDURES:                                                    Name : Shave Excision  Indication : Excision of tissue for pathology evaluation.  Location(s) : Left lateral posterior leg, 2 cm inferior to flexor crease : 7 x 5 mm in size, indurated, scaly, erythematous lesion. ? Basal cell carcinoma ? Squamous cell carcinoma ? Other. Patient to return for shave excision.  Completed by : Katlyn Sultana MD  Photo Taken : yes in 6/18/18 office note.  Anesthesia : Patient was anesthetized by infiltrating the area surrounding the lesion with 1% lidocaine.   epinephrine [id number] : Yes.  Buffered with bicarbonate : Yes.  Note : Discussed the risk of pain, infection, scarring, hypo- or hyperpigmentation and recurrence or need for re-treatment. The benefits of treatment and alternative treatments were also discussed.    During this procedure, the universal protocol was utilized. The patient's identity was confirmed by no less than two patient identifiers, correct procedure was verified, correct site was verified and marked as applicable and a final pause was completed.    Sterile technique was used throughout the procedure. The skin was cleaned and prepped with surgical cleanser. Once adequate anesthesia was obtained, the lesion was removed with a deep scallop shave procedure. The specimen was sent to pathology.    Direct pressure and aluminum chloride and monopolar cautery was applied for hemostasis. No bleeding was present upon the completion of the procedure. The wound was coated with antibacterial ointment. A dry sterile dressing was applied. Patient tolerated the procedure well and left in satisfactory condition.    Primary provider and referring provider will be informed regarding the tissue report when it returns.      The information in this  document, created by the medical scribe for me, accurately reflects the services I personally performed and the decisions made by me. I have reviewed and approved this document for accuracy prior to leaving the patient care area.  Katlyn Sultana MD July 2, 2018 7:45 AM  [facility] [place]

## 2018-07-03 LAB — COPATH REPORT: NORMAL

## 2018-07-19 ENCOUNTER — TELEPHONE (OUTPATIENT)
Dept: FAMILY MEDICINE | Facility: CLINIC | Age: 63
End: 2018-07-19

## 2018-07-19 DIAGNOSIS — D09.9 SQUAMOUS CELL CARCINOMA IN SITU: ICD-10-CM

## 2018-07-19 DIAGNOSIS — C44.91 SUPERFICIAL BASAL CELL CARCINOMA: ICD-10-CM

## 2018-07-19 NOTE — TELEPHONE ENCOUNTER
Encounter : voicemail  Discussed lab results :       Pathology report :                Skin, left lateral posterior leg:   - Basal cell carcinoma, superficial type, extending to the lateral margin   - (see description)            Recommendations:                Apply 5 FU bid for 4 weeks.             We have previously discussed this medication at her last visit.     Garima Sultana M.D.

## 2018-07-20 NOTE — TELEPHONE ENCOUNTER
Left message on answering machine for patient to call back.    Almaz Linton,RN BSN  [facility]  [phone]

## 2018-07-23 RX ORDER — FLUOROURACIL 50 MG/G
CREAM TOPICAL
Qty: 80 G | Refills: 3 | Status: SHIPPED | OUTPATIENT
Start: 2018-07-23 | End: 2018-07-25

## 2018-07-23 NOTE — TELEPHONE ENCOUNTER
Prefer rx for 5 FU sent ot [facility] for a 3 month supply. Will cost less for the patient.    Patient calling clarification as to when to start 5 FU. Patient is not starting until September to start due to summer and her trip to [place].  Patient was confused that Dr. Sultana want to see her back in September. How long after start of treatment until f/u appointment.    Yoselin Dumont RN

## 2018-07-23 NOTE — TELEPHONE ENCOUNTER
PLease call and let her know that I faxed the 5 FU to [facility]. My fault for the confusion. Have her start the 5 FU after she returns then I want to see her 2 weeks after starting the 5 FU to  if the reaction is sufficient or whether we need to continue the 5 FU longer.     Thank you,   Garima Sultana M.D.

## 2018-07-24 NOTE — TELEPHONE ENCOUNTER
Patient notified of and providers message, patient has no further questions.    Almaz SYLVESTERRN BSN  [facility]  [phone]

## 2018-07-25 ENCOUNTER — TELEPHONE (OUTPATIENT)
Dept: FAMILY MEDICINE | Facility: CLINIC | Age: 63
End: 2018-07-25

## 2018-07-25 DIAGNOSIS — D09.9 SQUAMOUS CELL CARCINOMA IN SITU: ICD-10-CM

## 2018-07-25 DIAGNOSIS — C44.712: Primary | ICD-10-CM

## 2018-07-25 DIAGNOSIS — C44.91 SUPERFICIAL BASAL CELL CARCINOMA: ICD-10-CM

## 2018-07-25 DIAGNOSIS — C44.719 BASAL CELL CARCINOMA OF LEFT LOWER EXTREMITY: ICD-10-CM

## 2018-07-25 RX ORDER — FLUOROURACIL 50 MG/G
CREAM TOPICAL
Qty: 80 G | Refills: 3 | Status: SHIPPED | OUTPATIENT
Start: 2018-07-25 | End: 2018-09-10

## 2018-07-25 RX ORDER — FLUOROURACIL 50 MG/G
CREAM TOPICAL
Qty: 160 G | Refills: 0 | Status: SHIPPED | OUTPATIENT
Start: 2018-07-25 | End: 2018-09-10

## 2018-07-25 NOTE — TELEPHONE ENCOUNTER
[facility] sent a fax asking for clarification on the frequency of the Efudex- please resend with frequency and change to a 90 days supply if appropriate.    Almaz Linton RN BSN  [facility]  [phone]

## 2018-07-31 NOTE — TELEPHONE ENCOUNTER
[facility] calling to verify the dose of patient's fluorouracil (EFUDEX) 5 % cream  They received two scripts  Please call [phone]  Alla CYR

## 2018-09-04 NOTE — PROGRESS NOTES

## 2018-09-10 ENCOUNTER — OFFICE VISIT (OUTPATIENT)
Dept: FAMILY MEDICINE | Facility: CLINIC | Age: 63
End: 2018-09-10
Payer: COMMERCIAL

## 2018-09-10 VITALS
OXYGEN SATURATION: 97 % | RESPIRATION RATE: 12 BRPM | TEMPERATURE: 97.4 F | BODY MASS INDEX: 27.66 KG/M2 | WEIGHT: 166 LBS | HEART RATE: 64 BPM | DIASTOLIC BLOOD PRESSURE: 80 MMHG | SYSTOLIC BLOOD PRESSURE: 138 MMHG | HEIGHT: 65 IN

## 2018-09-10 DIAGNOSIS — E03.9 ACQUIRED HYPOTHYROIDISM: ICD-10-CM

## 2018-09-10 DIAGNOSIS — E78.5 HYPERLIPIDEMIA WITH TARGET LDL LESS THAN 130: ICD-10-CM

## 2018-09-10 DIAGNOSIS — Z23 NEED FOR PROPHYLACTIC VACCINATION AND INOCULATION AGAINST INFLUENZA: ICD-10-CM

## 2018-09-10 DIAGNOSIS — F41.8 SITUATIONAL ANXIETY: Primary | ICD-10-CM

## 2018-09-10 PROCEDURE — 90471 IMMUNIZATION ADMIN: CPT | Performed by: FAMILY MEDICINE

## 2018-09-10 PROCEDURE — 90682 RIV4 VACC RECOMBINANT DNA IM: CPT | Performed by: FAMILY MEDICINE

## 2018-09-10 PROCEDURE — 99213 OFFICE O/P EST LOW 20 MIN: CPT | Mod: 25 | Performed by: FAMILY MEDICINE

## 2018-09-10 RX ORDER — SIMVASTATIN 40 MG
TABLET ORAL
Qty: 90 TABLET | Refills: 0 | Status: CANCELLED | OUTPATIENT
Start: 2018-09-10

## 2018-09-10 RX ORDER — ALPRAZOLAM 0.25 MG
.25-.5 TABLET ORAL 3 TIMES DAILY PRN
Qty: 8 TABLET | Refills: 0 | Status: SHIPPED | OUTPATIENT
Start: 2018-09-10 | End: 2019-10-01

## 2018-09-10 RX ORDER — LEVOTHYROXINE SODIUM 112 MCG
TABLET ORAL
Qty: 90 TABLET | Refills: 0 | Status: CANCELLED | OUTPATIENT
Start: 2018-09-10

## 2018-09-10 NOTE — PATIENT INSTRUCTIONS
[facility]    If you have any questions regarding to your visit please contact your care team:       Team Red:   Clinic Hours Telephone Number   Dr. Venessa Carlin, NP 7am-7pm  Monday - Thursday   7am-5pm  Fridays  [phone]  (Appointment scheduling available 24/7)   Urgent Care - [place] and [facility] - 11am-9pm Monday-Friday Saturday-Sunday- 9am-5pm   [place] - 5pm-9pm Monday-Friday Saturday-Sunday- 9am-5pm  [phone] - [place]  [phone] - [place]       What options do I have for a visit other than an office visit? We offer electronic visits (e-visits) and telephone visits, when medically appropriate.  Please check with your medical insurance to see if these types of visits are covered, as you will be responsible for any charges that are not paid by your insurance.      You can use [facility] (secure electronic communication) to access to your chart, send your primary care provider a message, or make an appointment. Ask a team member how to get started.     For a price quote for your services, please call our Consumer Price Line at [phone] or our Imaging Cost estimation line at [phone] (for imaging tests).    Ghada BARNETT MA

## 2018-09-10 NOTE — MR AVS SNAPSHOT
After Visit Summary   9/10/2018    Jacqueline Banegas    MRN: [id number]           Patient Information     Date Of Birth          1955        Visit Information        Provider Department      9/10/2018 10:40 AM Venessa Carter MD [facility]        Today's Diagnoses     Situational anxiety    -  1    Hyperlipidemia with target LDL less than 130        Acquired hypothyroidism        Need for prophylactic vaccination and inoculation against influenza          Care Instructions    [facility]    If you have any questions regarding to your visit please contact your care team:       Team Red:   Clinic Hours Telephone Number   Dr. Venessa Carlin, NP 7am-7pm  Monday - Thursday   7am-5pm  Fridays  [phone]  (Appointment scheduling available 24/7)   Urgent Care - [place] and [facility] - 11am-9pm Monday-Friday Saturday-Sunday- 9am-5pm   [place] - 5pm-9pm Monday-Friday Saturday-Sunday- 9am-5pm  [phone] - [place]  [phone] - [place]       What options do I have for a visit other than an office visit? We offer electronic visits (e-visits) and telephone visits, when medically appropriate.  Please check with your medical insurance to see if these types of visits are covered, as you will be responsible for any charges that are not paid by your insurance.      You can use Fixetude (secure electronic communication) to access to your chart, send your primary care provider a message, or make an appointment. Ask a team member how to get started.     For a price quote for your services, please call our Consumer Price Line at [phone] or our Imaging Cost estimation line at [phone] (for imaging tests).    Ghada BARNETT MA            Follow-ups after your visit        Follow-up notes from your care team     Return in about 6 weeks (around 10/22/2018) for physical (fasting labs up to one week prior).      Your  "next 10 appointments already scheduled     Nov 01, 2018  9:40 AM CDT   PHYSICAL with Venessa Carter MD   [facility] ([facility])    [address]  [place] [address] [phone]              Who to contact     If you have questions or need follow up information about today's clinic visit or your schedule please contact [facility] directly at [phone].  Normal or non-critical lab and imaging results will be communicated to you by IND Lifetechhart, letter or phone within 4 business days after the clinic has received the results. If you do not hear from us within 7 days, please contact the clinic through [employer] or phone. If you have a critical or abnormal lab result, we will notify you by phone as soon as possible.  Submit refill requests through Mercora or call your pharmacy and they will forward the refill request to us. Please allow 3 business days for your refill to be completed.          Additional Information About Your Visit        Mercora Information     Mercora gives you secure access to your electronic health record. If you see a primary care provider, you can also send messages to your care team and make appointments. If you have questions, please call your primary care clinic.  If you do not have a primary care provider, please call [phone] and they will assist you.        Care EveryWhere ID     This is your Care EveryWhere ID. This could be used by other organizations to access your [place] medical records  [id number]        Your Vitals Were     Pulse Temperature Respirations Height Pulse Oximetry Breastfeeding?    64 97.4  F (36.3  C) (Oral) 12 5' 4.5\" (1.638 m) 97% No    BMI (Body Mass Index)                   28.05 kg/m2            Blood Pressure from Last 3 Encounters:   09/10/18 138/80   07/02/18 132/83   06/18/18 146/87    Weight from Last 3 Encounters:   09/10/18 166 lb (75.3 kg)   11/30/17 158 lb (71.7 kg)   10/31/17 159 lb (72.1 kg)    "           We Performed the Following     FLU VACCINE, (RIV4) RECOMBINANT HA  , IM (FluBlok, egg free) [68982]- >18 YRS (FMG recommended  50-64 YRS)     Vaccine Administration, Initial [70288]          Today's Medication Changes          These changes are accurate as of 9/10/18 11:32 AM.  If you have any questions, ask your nurse or doctor.               Start taking these medicines.        Dose/Directions    ALPRAZolam 0.25 MG tablet   Commonly known as:  XANAX   Used for:  Situational anxiety   Started by:  Venessa Carter MD        Dose:  0.25-0.5 mg   Take 1-2 tablets (0.25-0.5 mg) by mouth 3 times daily as needed for anxiety . No further refills until follow-up appointment   Quantity:  8 tablet   Refills:  0            Where to get your medicines      Some of these will need a paper prescription and others can be bought over the counter.  Ask your nurse if you have questions.     Bring a paper prescription for each of these medications     ALPRAZolam 0.25 MG tablet                Primary Care Provider Office Phone # Fax #    Venessa Carter [phone]       [address] [facility] [facility] [address]        Equal Access to Services     [facility] AH: Hadii jaiden vickers hadasho Sochristianoali, waaxda luqadaha, qaybta kaalmada adeegyada, marco antonio neil. So [facility] [phone].    ATENCIÓN: Si habla español, tiene a [place] disposición servicios gratuitos de asistencia lingüística. Johnna al [phone].    We comply with applicable federal civil rights laws and [place] laws. We do not discriminate on the basis of race, color, national origin, age, disability, sex, sexual orientation, or gender identity.            Thank you!     Thank you for choosing [facility]  for your care. Our goal is always to provide you with excellent care. Hearing back from our patients is one way we can continue to improve our services. Please take a few minutes to complete the written survey that  you may receive in the mail after your visit with us. Thank you!             Your Updated Medication List - Protect others around you: Learn how to safely use, store and throw away your medicines at www.disposemymeds.org.          This list is accurate as of 9/10/18 11:32 AM.  Always use your most recent med list.                   Brand Name Dispense Instructions for use Diagnosis    ALLEGRA PO      Take by mouth as needed for allergies Reported on 5/9/2017        ALPRAZolam 0.25 MG tablet    XANAX    8 tablet    Take 1-2 tablets (0.25-0.5 mg) by mouth 3 times daily as needed for anxiety . No further refills until follow-up appointment    Situational anxiety       aspirin 81 MG tablet      Take 81 mg by mouth daily Reported on 5/9/2017        simvastatin 40 MG tablet    ZOCOR    90 tablet    Take 1 tablet by mouth daily    Hyperlipidemia with target LDL less than 130       SUMAtriptan 50 MG tablet    IMITREX    9 tablet    Take 1-2 tablets ( mg) by mouth at onset of headache for migraine May repeat in 2 hours if needed: max 2/day    Migraine without status migrainosus, not intractable, unspecified migraine type       SYNTHROID 112 MCG tablet   Generic drug:  levothyroxine     90 tablet    Take 1 tablet by mouth daily    Acquired hypothyroidism

## 2018-09-10 NOTE — PROGRESS NOTES
"SUBJECTIVE:  Jacqueline Banegas is a 63 year old female who presents with situational anxiety with airplane travel. Symptom onset has been intermittent, unchanged for a time period of years. Severity is described as moderate and off and on. Course of her symptoms over time is unchanged but she is preparing for a trip to [place] with family soon.     Hypercholesterolemia well controlled with current treatment plan without side effects. Patient also presents for follow-up of hypothyroidism, controlled on current medication.  Denies symptoms of hypo- or hyperthyroidism.     OBJECTIVE:  EXAM:  /80  Pulse 64  Temp 97.4  F (36.3  C) (Oral)  Resp 12  Ht 5' 4.5\" (1.638 m)  Wt 166 lb (75.3 kg)  SpO2 97%  Breastfeeding? No  BMI 28.05 kg/m2   Constitutional: alert and no distress   Psychiatric: mentation appears normal and affect normal/bright  Head: Normocephalic. No masses, lesions, tenderness or abnormalities  JOINT/EXTREMITIES: extremities normal- no gross deformities noted and gait normal    ASSESSMENT/PLAN:  (F41.8) Situational anxiety  (primary encounter diagnosis)  Plan: ALPRAZolam (XANAX) 0.25 MG tablet        Discussed risks and benefits of this medication. No further refills until follow-up appointment     (E78.5) Hyperlipidemia with target LDL less than 130  Comment: Well controlled with medications without side effects.     (E03.9) Acquired hypothyroidism  Comment: euthyroid on replacement   Plan: follow-up this fall for routine care and labs as planned     Venessa Carter MD    TIM Score (Last Two) 8/22/2011 8/22/2012   TIM Raw Score 52 52   Activation Score 100 100   TIM Level 4 4       "

## 2018-10-24 ENCOUNTER — OFFICE VISIT (OUTPATIENT)
Dept: FAMILY MEDICINE | Facility: CLINIC | Age: 63
End: 2018-10-24
Payer: COMMERCIAL

## 2018-10-24 VITALS
OXYGEN SATURATION: 100 % | HEIGHT: 65 IN | SYSTOLIC BLOOD PRESSURE: 131 MMHG | HEART RATE: 92 BPM | BODY MASS INDEX: 28.05 KG/M2 | DIASTOLIC BLOOD PRESSURE: 86 MMHG

## 2018-10-24 DIAGNOSIS — Z85.828 HISTORY OF BASAL CELL CARCINOMA: Primary | ICD-10-CM

## 2018-10-24 DIAGNOSIS — D09.9 SQUAMOUS CELL CARCINOMA IN SITU: ICD-10-CM

## 2018-10-24 DIAGNOSIS — Z86.007 HISTORY OF SQUAMOUS CELL CARCINOMA IN SITU OF SKIN: ICD-10-CM

## 2018-10-24 DIAGNOSIS — C44.91 SUPERFICIAL BASAL CELL CARCINOMA: ICD-10-CM

## 2018-10-24 PROCEDURE — 99213 OFFICE O/P EST LOW 20 MIN: CPT | Performed by: FAMILY MEDICINE

## 2018-10-24 RX ORDER — FLUOROURACIL 50 MG/G
CREAM TOPICAL
Qty: 80 G | Refills: 3 | Status: SHIPPED | OUTPATIENT
Start: 2018-10-24 | End: 2019-10-01

## 2018-10-24 NOTE — LETTER
10/24/2018         RE: Jacqueline Banegas  [address]  [place] [address]        Dear Colleague,    Thank you for [place] your patient[place] [facility] Please see a copy of my visit note below.    [facility] - PRIMARY CARE SKIN    CC : basal cell carcinoma, squamous cell carcinoma in situ, lesion(s)  SUBJECTIVE:                                                    Jacqueline Banegas is a 63 year old female who presents to clinic today for follow-up of pathology confirmed skin cancers.     She has used fluorouracil 5% cream field therapy on the entire lower legs for 2 weeks. Some redness has developed, but she denies excessive irritation.    She has not had re-excision of nodular basal cell carcinoma on the right upper chest.    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  6/18/2018 - Superficial basal cell carcinoma on left mid-arm extending to deep and lateral margins (to be treated with fluorouracil 5% cream)  6/18/2018 - Superficial basal cell carcinoma on left lateral mid-upper arm, narrowly excised (to be treated with fluorouracil 5% cream)  6/18/2018 - Nodular basal cell carcinoma on right upper chest, extending to deep margin (to be monitored)  6/18/2018 - Superficial basal cell carcinoma on chest midline at T3, narrowly excised (to be treated with fluorouracil 5% cream)  6/18/2018 - Squamous cell carcinoma in situ on left upper chest, narrowly excised (to be treated with fluorouracil 5% cream)  7/3/2018 - Superficial basal cell carcinoma on left lateral posterior leg extending to lateral margin (treated with fluorouracil 5% cream)    Family history of skin cancer : YES - non-melanoma  skin cancer in father and sister.  Autoimmune : NO    Ancestry : Egyptian, English, Latvian, Northern     Sun Exposure History  Previous history of significant sun exposure:  Blistering sunburns : YES  Tanning beds : YES - when younger.  Sunscreen Use : YES, SPF : 50.  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    Refer to electronic medical record (EMR) for past medical history and medications.    INTEGUMENTARY/SKIN: POSITIVE for non-healing lesions  ROS : 14 point review of systems was negative except the symptoms listed above in the HPI.    This document serves as a record of the services and decisions personally performed and made by Katlyn Sultana MD. It was created on her behalf by Nicho Garay, a trained medical scribe.  The creation of this document is based on the scribe's personal observations and the provider's statements to the medical scribe.  Nicho Garay, October 24, 2018 7:58 AM      OBJECTIVE:                                                    GENERAL: healthy, alert and no distress  SKIN: Choudhary Skin Type - I.  Legs, Arms, Face, Chest examined. The dermatoscope was used to help evaluate pigmented lesions.  Skin Pertinent Findings:  Lower legs : scattered small patches of erythema, light scaling and some induration consistent with appropriate response to fluorouracil 5% cream.    Remainder of lower legs appear clear.  Right low back and right lateral back- 4 mm scaly, nonindurated erythematous lesions consistent with actinic keratosis or superficial basal cell carcinoma . Because of her multiple superficial basal cell carcinoma ( all have similar appearance ) we are treating all of these with topical 5 fluorouracil and continue with frequent skin exams.    Arms : actinic damage , multiple actinic keratosis     Right radial forearm : 4 mm in size scaly slightly erythematous  "lesion.    Right upper chest : actinic damage, multiple actinic keratosis.      Previous shave excision site on the right upper chest, 1 cm inferior to the clavicle, no suspicious changes. This was a nodular basal cell carcinoma so further close observation for any recurrence.          ASSESSMENT:                                                      Encounter Diagnoses   Name Primary?     History of basal cell carcinoma Yes     History of squamous cell carcinoma in situ of skin          PLAN:                                                    Patient Instructions   FUTURE APPOINTMENTS  Follow up in 2 month(s) for a full-body skin cancer screening.    Apply fluorouracil 5% cream for 2 weeks on the arms, upper chest, and back.    Apply OTC hydrocortisone 1% cream as needed for irritation of the legs.    SUN PROTECTION INSTRUCTIONS  Sun damage can lead to skin cancer and premature aging of the skin.      The best way to protect from sun damage to your skin is to avoid the sun during peak hours (10 am - 2 pm) even on overcast days.      Use UPF sun-protective clothing, which while more expensive initially provides longer lasting coverage without having to worry about remembering to re-apply.  1. Wear a wide-brimmed hat and sunglasses.   2. Wear sun-protective clothing.  [employer] and other [employer] make sun protective clothing that are stylish, comfortable and cool. [employer] and other [employer] make UV arm sleeves suitable for golfing, gardening and other activities.      Sunscreen instructions:  1. Use sunscreens with Zinc Oxide, Titanium Dioxide or Avobenzone to protect from UVA rays.  2. Use SPF 30-50+ to protect from UVB rays.  3. Re-apply every 2 hours even if water resistant.  4. Apply on your face every day even when cloudy and even in the winter. UVA \"aging rays\" penetrate window glass and are just as strong in the winter as in the summer.    Product Recommendations:    Good examples include: " "Blue Lucero, FermintaMD, Solbar    Good daily moisturizers with SPF: Vanicream, CeraVe.    For sensitive skin, consider : SkinMedica Essential Defense Mineral Shield Broad Spectrum SPF 35    Men: consider use of Neutrogena Triple Protect Facial Lotion      Never use tanning beds. Tanning beds are associated with much higher risks of skin cancer.    All tanning damages the skin. Aim for ivory skin year round and you will have less trouble with your skin in years to come. There is no merit in getting \"a base tan\" before a warm weather vacation, as any tanning indicates your body's response to sun damage.    Stop smoking. Smokers have higher rates of skin cancer and also have premature skin wrinkling.    FYI  You should use about 3 tablespoons of sunscreen to protect your whole body. Thus a typical eight ounce bottle of sunscreen should last 4 applications. Remember, that the SPF rating is compromised if you don t apply enough. Most people only apply 1/2 - 1/3 of the amount they need. Also don t forget areas such as your ears, feet, upper back and harder to reach places. Keep in mind that these amounts should be increased for larger body sizes.    Sunscreens with titanium dioxide and/or zinc oxide in the active ingredients are physical blockers as opposed to chemical blockers. Chemical-free sunscreens should not irritate the skin.    Spray-on sunscreens may be used for touch-up application only, not as a base layer. Also, use with caution around small children due to inhalation risk.    Avoid retinyl palmitate products.    Avoid combination products that include both sunscreen and insect repellant, as sunscreen should be applied every 2 hours, but insect repellant should not be applied as frequently.    SPF means sun protection factor, which is just the degree to which the sunscreen can protect against UVB rays. There is no rating system for UVA rays. SPF is calculated as the time skin will burn when sunscreen is applied " vs. skin without sunscreen.    Water resistant sunscreens should be re-applied every 1-2 hours.    For more information:  http://www.skincancer.org/prevention/sun-protection/sunscreen/sunscreens-safe-and-effective    SKIN CANCER SELF-EXAM INSTRUCTIONS  Check every month in the mirror or with a household member. Be aware of any changes, especially bleeding or tenderness. Also, make sure to check your nails for color changes after removal of nail polish.    For melanoma, check for:  A - Asymmetry. One half unlike the other half.  B - Border. Irregular, scalloped, ragged, notched, blurred or poorly defined borders.  C - Color. Color variations from one area to another, with shades of tan, brown and/or black present. Sometimes white, red or blue.  D - Diameter. Greater than 6 mm (about the size of a pencil eraser). Any new growth of a mole should be concerning and be evaluated.  E - Evolving. A mole or skin lesion that looks different from the rest or is changing in size, shape or color.    For basal cell carcinoma and squamous cell carcinoma, check for:    Sores, shiny bumps, nodules, scaly lesions, or wart-like growths that are itchy, tender, crusting, scabbing, eroding, oozing or bleeding.    Open sores/wounds or reddish/irritated areas that do not heal within 2-3 weeks.    Scar-like areas that are white, yellow or waxy in color.    The patient was counseled about sunscreens and sun avoidance. The patient was counseled to check the skin regularly and report any lesion that is new, changing, itching, scabbing, bleeding or otherwise bothersome. The patient was discharged ambulatory and in stable condition.      PROCEDURES:                                                    None.    TT: 20 minutes.  CT: 15 minutes.      The information in this document, created by the medical scribe for me, accurately reflects the services I personally performed and the decisions made by me. I have reviewed and approved this document  for accuracy prior to leaving the patient care area.  Katlyn Sultana MD October 24, 2018 7:53 AM  [facility]    Again, thank you for allowing me to participate in the care of your patient.        Sincerely,        Garima Sultana MD

## 2018-10-24 NOTE — PROGRESS NOTES
[facility] - PRIMARY CARE SKIN    CC : basal cell carcinoma, squamous cell carcinoma in situ, lesion(s)  SUBJECTIVE:                                                    Jacqueline Banegas is a 63 year old female who presents to clinic today for follow-up of pathology confirmed skin cancers.     She has used fluorouracil 5% cream field therapy on the entire lower legs for 2 weeks. Some redness has developed, but she denies excessive irritation.    She has not had re-excision of nodular basal cell carcinoma on the right upper chest.    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  6/18/2018 - Superficial basal cell carcinoma on left mid-arm extending to deep and lateral margins (to be treated with fluorouracil 5% cream)  6/18/2018 - Superficial basal cell carcinoma on left lateral mid-upper arm, narrowly excised (to be treated with fluorouracil 5% cream)  6/18/2018 - Nodular basal cell carcinoma on right upper chest, extending to deep margin (to be monitored)  6/18/2018 - Superficial basal cell carcinoma on chest midline at T3, narrowly excised (to be treated with fluorouracil 5% cream)  6/18/2018 - Squamous cell carcinoma in situ on left upper chest, narrowly excised (to be treated with fluorouracil 5% cream)  7/3/2018 - Superficial basal cell carcinoma on left lateral posterior leg extending to lateral margin (treated with fluorouracil 5% cream)    Family history of skin cancer : YES - non-melanoma skin cancer in father and sister.  Autoimmune : NO    Ancestry : Edith, English, [place], Northern     Sun Exposure History  Previous history of significant sun exposure:  Blistering sunburns : YES  Tanning beds : YES - when  younger.  Sunscreen Use : YES, SPF : 50.  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    Refer to electronic medical record (EMR) for past medical history and medications.    INTEGUMENTARY/SKIN: POSITIVE for non-healing lesions  ROS : 14 point review of systems was negative except the symptoms listed above in the HPI.    This document serves as a record of the services and decisions personally performed and made by Katlyn Sultana MD. It was created on her behalf by Nicho Garay, a trained medical scribe.  The creation of this document is based on the scribe's personal observations and the provider's statements to the medical scribe.  Nicho Garay, October 24, 2018 7:58 AM      OBJECTIVE:                                                    GENERAL: healthy, alert and no distress  SKIN: Choudhary Skin Type - I.  Legs, Arms, Face, Chest examined. The dermatoscope was used to help evaluate pigmented lesions.  Skin Pertinent Findings:  Lower legs : scattered small patches of erythema, light scaling and some induration consistent with appropriate response to fluorouracil 5% cream.    Remainder of lower legs appear clear.  Right low back and right lateral back- 4 mm scaly, nonindurated erythematous lesions consistent with actinic keratosis or superficial basal cell carcinoma . Because of her multiple superficial basal cell carcinoma ( all have similar appearance ) we are treating all of these with topical 5 fluorouracil and continue with frequent skin exams.    Arms : actinic damage , multiple actinic keratosis     Right radial forearm : 4 mm in size scaly slightly erythematous lesion.    Right upper chest : actinic damage, multiple actinic keratosis.      Previous shave excision site on the right upper chest, 1 cm inferior to the clavicle, no suspicious changes. This was a nodular basal cell carcinoma so  "further close observation for any recurrence.          ASSESSMENT:                                                      Encounter Diagnoses   Name Primary?     History of basal cell carcinoma Yes     History of squamous cell carcinoma in situ of skin          PLAN:                                                    Patient Instructions   FUTURE APPOINTMENTS  Follow up in 2 month(s) for a full-body skin cancer screening.    Apply fluorouracil 5% cream for 2 weeks on the arms, upper chest, and back.    Apply OTC hydrocortisone 1% cream as needed for irritation of the legs.    SUN PROTECTION INSTRUCTIONS  Sun damage can lead to skin cancer and premature aging of the skin.      The best way to protect from sun damage to your skin is to avoid the sun during peak hours (10 am - 2 pm) even on overcast days.      Use UPF sun-protective clothing, which while more expensive initially provides longer lasting coverage without having to worry about remembering to re-apply.  1. Wear a wide-brimmed hat and sunglasses.   2. Wear sun-protective clothing.  [employer] and other [employer] make sun protective clothing that are stylish, comfortable and cool. [employer] and other [employer] make UV arm sleeves suitable for golfing, gardening and other activities.      Sunscreen instructions:  1. Use sunscreens with Zinc Oxide, Titanium Dioxide or Avobenzone to protect from UVA rays.  2. Use SPF 30-50+ to protect from UVB rays.  3. Re-apply every 2 hours even if water resistant.  4. Apply on your face every day even when cloudy and even in the winter. UVA \"aging rays\" penetrate window glass and are just as strong in the winter as in the summer.    Product Recommendations:    Good examples include: Oc Barker, Marco, Solbar    Good daily moisturizers with SPF: Vanicream, CeraVe.    For sensitive skin, consider : SkinMedica Essential Defense Mineral Shield Broad Spectrum SPF 35    Men: consider use of Neutrogena Triple Protect " "Facial Lotion      Never use tanning beds. Tanning beds are associated with much higher risks of skin cancer.    All tanning damages the skin. Aim for ivory skin year round and you will have less trouble with your skin in years to come. There is no merit in getting \"a base tan\" before a warm weather vacation, as any tanning indicates your body's response to sun damage.    Stop smoking. Smokers have higher rates of skin cancer and also have premature skin wrinkling.    FYI  You should use about 3 tablespoons of sunscreen to protect your whole body. Thus a typical eight ounce bottle of sunscreen should last 4 applications. Remember, that the SPF rating is compromised if you don t apply enough. Most people only apply 1/2 - 1/3 of the amount they need. Also don t forget areas such as your ears, feet, upper back and harder to reach places. Keep in mind that these amounts should be increased for larger body sizes.    Sunscreens with titanium dioxide and/or zinc oxide in the active ingredients are physical blockers as opposed to chemical blockers. Chemical-free sunscreens should not irritate the skin.    Spray-on sunscreens may be used for touch-up application only, not as a base layer. Also, use with caution around small children due to inhalation risk.    Avoid retinyl palmitate products.    Avoid combination products that include both sunscreen and insect repellant, as sunscreen should be applied every 2 hours, but insect repellant should not be applied as frequently.    SPF means sun protection factor, which is just the degree to which the sunscreen can protect against UVB rays. There is no rating system for UVA rays. SPF is calculated as the time skin will burn when sunscreen is applied vs. skin without sunscreen.    Water resistant sunscreens should be re-applied every 1-2 hours.    For more information:  http://www.skincancer.org/prevention/sun-protection/sunscreen/sunscreens-safe-and-effective    SKIN CANCER " SELF-EXAM INSTRUCTIONS  Check every month in the mirror or with a household member. Be aware of any changes, especially bleeding or tenderness. Also, make sure to check your nails for color changes after removal of nail polish.    For melanoma, check for:  A - Asymmetry. One half unlike the other half.  B - Border. Irregular, scalloped, ragged, notched, blurred or poorly defined borders.  C - Color. Color variations from one area to another, with shades of tan, brown and/or black present. Sometimes white, red or blue.  D - Diameter. Greater than 6 mm (about the size of a pencil eraser). Any new growth of a mole should be concerning and be evaluated.  E - Evolving. A mole or skin lesion that looks different from the rest or is changing in size, shape or color.    For basal cell carcinoma and squamous cell carcinoma, check for:    Sores, shiny bumps, nodules, scaly lesions, or wart-like growths that are itchy, tender, crusting, scabbing, eroding, oozing or bleeding.    Open sores/wounds or reddish/irritated areas that do not heal within 2-3 weeks.    Scar-like areas that are white, yellow or waxy in color.    The patient was counseled about sunscreens and sun avoidance. The patient was counseled to check the skin regularly and report any lesion that is new, changing, itching, scabbing, bleeding or otherwise bothersome. The patient was discharged ambulatory and in stable condition.      PROCEDURES:                                                    None.    TT: 20 minutes.  CT: 15 minutes.      The information in this document, created by the medical scribe for me, accurately reflects the services I personally performed and the decisions made by me. I have reviewed and approved this document for accuracy prior to leaving the patient care area.  Katlyn Sultana MD October 24, 2018 7:53 AM  [facility] [place]

## 2018-10-24 NOTE — MR AVS SNAPSHOT
"              After Visit Summary   10/24/2018    Jacqueline Banegas    MRN: [id number]           Patient Information     Date Of Birth          1955        Visit Information        Provider Department      10/24/2018 7:40 AM Garima Sultana MD [facility] [place]        Today's Diagnoses     History of basal cell carcinoma    -  1    History of squamous cell carcinoma in situ of skin        Superficial basal cell carcinoma        Squamous cell carcinoma in situ          Care Instructions    FUTURE APPOINTMENTS  Follow up in 2 month(s) for a full-body skin cancer screening.    Apply fluorouracil 5% cream for 2 weeks on the arms, upper chest, and back.    Apply OTC hydrocortisone 1% cream as needed for irritation of the legs.    SUN PROTECTION INSTRUCTIONS  Sun damage can lead to skin cancer and premature aging of the skin.      The best way to protect from sun damage to your skin is to avoid the sun during peak hours (10 am - 2 pm) even on overcast days.      Use UPF sun-protective clothing, which while more expensive initially provides longer lasting coverage without having to worry about remembering to re-apply.  1. Wear a wide-brimmed hat and sunglasses.   2. Wear sun-protective clothing.  [employer] and other [employer] make sun protective clothing that are stylish, comfortable and cool. [employer] and other [employer] make UV arm sleeves suitable for golfing, gardening and other activities.      Sunscreen instructions:  1. Use sunscreens with Zinc Oxide, Titanium Dioxide or Avobenzone to protect from UVA rays.  2. Use SPF 30-50+ to protect from UVB rays.  3. Re-apply every 2 hours even if water resistant.  4. Apply on your face every day even when cloudy and even in the winter. UVA \"aging rays\" penetrate window glass and are just as strong in the winter as in the summer.    Product Recommendations:    Good examples include: Blue Blancaard, EltaMD, Solbar    Good daily moisturizers " "with SPF: Vanicream, CeraVe.    For sensitive skin, consider : SkinMedica Essential Defense Mineral Shield Broad Spectrum SPF 35    Men: consider use of Neutrogena Triple Protect Facial Lotion      Never use tanning beds. Tanning beds are associated with much higher risks of skin cancer.    All tanning damages the skin. Aim for ivory skin year round and you will have less trouble with your skin in years to come. There is no merit in getting \"a base tan\" before a warm weather vacation, as any tanning indicates your body's response to sun damage.    Stop smoking. Smokers have higher rates of skin cancer and also have premature skin wrinkling.    FYI  You should use about 3 tablespoons of sunscreen to protect your whole body. Thus a typical eight ounce bottle of sunscreen should last 4 applications. Remember, that the SPF rating is compromised if you don t apply enough. Most people only apply 1/2 - 1/3 of the amount they need. Also don t forget areas such as your ears, feet, upper back and harder to reach places. Keep in mind that these amounts should be increased for larger body sizes.    Sunscreens with titanium dioxide and/or zinc oxide in the active ingredients are physical blockers as opposed to chemical blockers. Chemical-free sunscreens should not irritate the skin.    Spray-on sunscreens may be used for touch-up application only, not as a base layer. Also, use with caution around small children due to inhalation risk.    Avoid retinyl palmitate products.    Avoid combination products that include both sunscreen and insect repellant, as sunscreen should be applied every 2 hours, but insect repellant should not be applied as frequently.    SPF means sun protection factor, which is just the degree to which the sunscreen can protect against UVB rays. There is no rating system for UVA rays. SPF is calculated as the time skin will burn when sunscreen is applied vs. skin without sunscreen.    Water resistant " sunscreens should be re-applied every 1-2 hours.    For more information:  http://www.skincancer.org/prevention/sun-protection/sunscreen/sunscreens-safe-and-effective    SKIN CANCER SELF-EXAM INSTRUCTIONS  Check every month in the mirror or with a household member. Be aware of any changes, especially bleeding or tenderness. Also, make sure to check your nails for color changes after removal of nail polish.    For melanoma, check for:  A - Asymmetry. One half unlike the other half.  B - Border. Irregular, scalloped, ragged, notched, blurred or poorly defined borders.  C - Color. Color variations from one area to another, with shades of tan, brown and/or black present. Sometimes white, red or blue.  D - Diameter. Greater than 6 mm (about the size of a pencil eraser). Any new growth of a mole should be concerning and be evaluated.  E - Evolving. A mole or skin lesion that looks different from the rest or is changing in size, shape or color.    For basal cell carcinoma and squamous cell carcinoma, check for:    Sores, shiny bumps, nodules, scaly lesions, or wart-like growths that are itchy, tender, crusting, scabbing, eroding, oozing or bleeding.    Open sores/wounds or reddish/irritated areas that do not heal within 2-3 weeks.    Scar-like areas that are white, yellow or waxy in color.          Follow-ups after your visit        Your next 10 appointments already scheduled     Nov 01, 2018  9:40 AM CDT   PHYSICAL with Venessa Carter MD   [facility] [place] ([facility] [place]    [address]  [place] [id number] [phone]              Who to contact     If you have questions or need follow up information about today's clinic visit or your schedule please contact [facility] [place] directly at [phone].  Normal or non-critical lab and imaging results will be communicated to you by MyChart, letter or phone within 4 business days after the clinic has received the results. If you  "do not hear from us within 7 days, please contact the clinic through [email or web address] or phone. If you have a critical or abnormal lab result, we will notify you by phone as soon as possible.  Submit refill requests through [email or web address] or call your pharmacy and they will forward the refill request to us. Please allow 3 business days for your refill to be completed.          Additional Information About Your Visit        Cardiocorehart Information     [email or web address] gives you secure access to your electronic health record. If you see a primary care provider, you can also send messages to your care team and make appointments. If you have questions, please call your primary care clinic.  If you do not have a primary care provider, please call [phone] and they will assist you.        Care EveryWhere ID     This is your Care EveryWhere ID. This could be used by other organizations to access your [place] medical records  [id number]        Your Vitals Were     Pulse Height Pulse Oximetry Breastfeeding? BMI (Body Mass Index)       92 5' 4.5\" (1.638 m) 100% No 28.05 kg/m2        Blood Pressure from Last 3 Encounters:   10/24/18 131/86   09/10/18 138/80   07/02/18 132/83    Weight from Last 3 Encounters:   09/10/18 166 lb (75.3 kg)   11/30/17 158 lb (71.7 kg)   10/31/17 159 lb (72.1 kg)              Today, you had the following     No orders found for display       Primary Care Provider Office Phone # Fax #    Venessa Carter [phone] [address] [address]        Equal Access to Services     MAX ALEMAN AH: Hadii aad ku hadasho Soomaali, waaxda luqadaha, qaybta kaalmada adejulietyada, marco antonio neil. So [facility] [phone].    ATENCIÓN: Si habla español, tiene a [place] disposición servicios gratuitos de asistencia lingüística. Llame al [phone].    We comply with applicable federal civil rights laws and [place] laws. We do not discriminate on the basis of race, color, national " origin, age, disability, sex, sexual orientation, or gender identity.            Thank you!     Thank you for choosing [facility] [place]  for your care. Our goal is always to provide you with excellent care. Hearing back from our patients is one way we can continue to improve our services. Please take a few minutes to complete the written survey that you may receive in the mail after your visit with us. Thank you!             Your Updated Medication List - Protect others around you: Learn how to safely use, store and throw away your medicines at www.disposemymeds.org.          This list is accurate as of 10/24/18  8:02 AM.  Always use your most recent med list.                   Brand Name Dispense Instructions for use Diagnosis    ALLEGRA PO      Take by mouth as needed for allergies Reported on 5/9/2017        ALPRAZolam 0.25 MG tablet    XANAX    8 tablet    Take 1-2 tablets (0.25-0.5 mg) by mouth 3 times daily as needed for anxiety . No further refills until follow-up appointment    Situational anxiety       aspirin 81 MG tablet      Take 81 mg by mouth daily Reported on 5/9/2017        simvastatin 40 MG tablet    ZOCOR    90 tablet    Take 1 tablet by mouth daily    Hyperlipidemia with target LDL less than 130       SUMAtriptan 50 MG tablet    IMITREX    9 tablet    Take 1-2 tablets ( mg) by mouth at onset of headache for migraine May repeat in 2 hours if needed: max 2/day    Migraine without status migrainosus, not intractable, unspecified migraine type       SYNTHROID 112 MCG tablet   Generic drug:  levothyroxine     90 tablet    Take 1 tablet by mouth daily    Acquired hypothyroidism

## 2018-10-24 NOTE — PATIENT INSTRUCTIONS
"FUTURE APPOINTMENTS  Follow up in 2 month(s) for a full-body skin cancer screening.    Apply fluorouracil 5% cream for 2 weeks on the arms, upper chest, and back.    Apply OTC hydrocortisone 1% cream as needed for irritation of the legs.    SUN PROTECTION INSTRUCTIONS  Sun damage can lead to skin cancer and premature aging of the skin.      The best way to protect from sun damage to your skin is to avoid the sun during peak hours (10 am - 2 pm) even on overcast days.      Use UPF sun-protective clothing, which while more expensive initially provides longer lasting coverage without having to worry about remembering to re-apply.  1. Wear a wide-brimmed hat and sunglasses.   2. Wear sun-protective clothing.  [employer] and other [employer] make sun protective clothing that are stylish, comfortable and cool. [employer] and other [employer] make UV arm sleeves suitable for golfing, gardening and other activities.      Sunscreen instructions:  1. Use sunscreens with Zinc Oxide, Titanium Dioxide or Avobenzone to protect from UVA rays.  2. Use SPF 30-50+ to protect from UVB rays.  3. Re-apply every 2 hours even if water resistant.  4. Apply on your face every day even when cloudy and even in the winter. UVA \"aging rays\" penetrate window glass and are just as strong in the winter as in the summer.    Product Recommendations:    Good examples include: Blue Lizard, EltaMD, Solbar    Good daily moisturizers with SPF: Vanicream, CeraVe.    For sensitive skin, consider : SkinMedica Essential Defense Mineral Shield Broad Spectrum SPF 35    Men: consider use of Neutrogena Triple Protect Facial Lotion      Never use tanning beds. Tanning beds are associated with much higher risks of skin cancer.    All tanning damages the skin. Aim for ivory skin year round and you will have less trouble with your skin in years to come. There is no merit in getting \"a base tan\" before a warm weather vacation, as any tanning indicates your " body's response to sun damage.    Stop smoking. Smokers have higher rates of skin cancer and also have premature skin wrinkling.    FYI  You should use about 3 tablespoons of sunscreen to protect your whole body. Thus a typical eight ounce bottle of sunscreen should last 4 applications. Remember, that the SPF rating is compromised if you don t apply enough. Most people only apply 1/2 - 1/3 of the amount they need. Also don t forget areas such as your ears, feet, upper back and harder to reach places. Keep in mind that these amounts should be increased for larger body sizes.    Sunscreens with titanium dioxide and/or zinc oxide in the active ingredients are physical blockers as opposed to chemical blockers. Chemical-free sunscreens should not irritate the skin.    Spray-on sunscreens may be used for touch-up application only, not as a base layer. Also, use with caution around small children due to inhalation risk.    Avoid retinyl palmitate products.    Avoid combination products that include both sunscreen and insect repellant, as sunscreen should be applied every 2 hours, but insect repellant should not be applied as frequently.    SPF means sun protection factor, which is just the degree to which the sunscreen can protect against UVB rays. There is no rating system for UVA rays. SPF is calculated as the time skin will burn when sunscreen is applied vs. skin without sunscreen.    Water resistant sunscreens should be re-applied every 1-2 hours.    For more information:  http://www.skincancer.org/prevention/sun-protection/sunscreen/sunscreens-safe-and-effective    SKIN CANCER SELF-EXAM INSTRUCTIONS  Check every month in the mirror or with a household member. Be aware of any changes, especially bleeding or tenderness. Also, make sure to check your nails for color changes after removal of nail polish.    For melanoma, check for:  A - Asymmetry. One half unlike the other half.  B - Border. Irregular, scalloped, ragged,  notched, blurred or poorly defined borders.  C - Color. Color variations from one area to another, with shades of tan, brown and/or black present. Sometimes white, red or blue.  D - Diameter. Greater than 6 mm (about the size of a pencil eraser). Any new growth of a mole should be concerning and be evaluated.  E - Evolving. A mole or skin lesion that looks different from the rest or is changing in size, shape or color.    For basal cell carcinoma and squamous cell carcinoma, check for:    Sores, shiny bumps, nodules, scaly lesions, or wart-like growths that are itchy, tender, crusting, scabbing, eroding, oozing or bleeding.    Open sores/wounds or reddish/irritated areas that do not heal within 2-3 weeks.    Scar-like areas that are white, yellow or waxy in color.

## 2018-10-30 ASSESSMENT — ENCOUNTER SYMPTOMS
HEADACHES: 0
SHORTNESS OF BREATH: 0
FREQUENCY: 0
ARTHRALGIAS: 0
JOINT SWELLING: 0
COUGH: 0
BREAST MASS: 0
DIARRHEA: 0
SORE THROAT: 0
HEMATURIA: 0
NERVOUS/ANXIOUS: 0
WEAKNESS: 0
HEARTBURN: 0
CHILLS: 0
PARESTHESIAS: 0
DYSURIA: 0
NAUSEA: 0
PALPITATIONS: 0
EYE PAIN: 0
MYALGIAS: 0
HEMATOCHEZIA: 0
DIZZINESS: 0
CONSTIPATION: 0
ABDOMINAL PAIN: 0
FEVER: 0

## 2018-10-31 NOTE — PATIENT INSTRUCTIONS
[facility]    If you have any questions regarding to your visit please contact your care team:       Team Red:   Clinic Hours Telephone Number   Dr. Venessa Carlin, NP 7am-7pm  Monday - Thursday   7am-5pm  Fridays  [phone]  (Appointment scheduling available 24/7)   Urgent Care - [place] and [facility] - 11am-9pm Monday-Friday Saturday-Sunday- 9am-5pm   [place] - 5pm-9pm Monday-Friday Saturday-Sunday- 9am-5pm  [phone] - [place]  [phone] - [place]       What options do I have for a visit other than an office visit? We offer electronic visits (e-visits) and telephone visits, when medically appropriate.  Please check with your medical insurance to see if these types of visits are covered, as you will be responsible for any charges that are not paid by your insurance.      You can use [employer] (secure electronic communication) to access to your chart, send your primary care provider a message, or make an appointment. Ask a team member how to get started.     For a price quote for your services, please call our Consumer Price Line at [phone] or our Imaging Cost estimation line at [phone] (for imaging tests).        Preventive Health Recommendations  Female Ages 50 - 64    Yearly exam: See your health care provider every year in order to  o Review health changes.   o Discuss preventive care.    o Review your medicines if your doctor has prescribed any.      Get a Pap test every three years (unless you have an abnormal result and your provider advises testing more often).    If you get Pap tests with HPV test, you only need to test every 5 years, unless you have an abnormal result.     You do not need a Pap test if your uterus was removed (hysterectomy) and you have not had cancer.    You should be tested each year for STDs (sexually transmitted diseases) if you're at risk.     Have a mammogram every 1 to 2  years.    Have a colonoscopy at age 50, or have a yearly FIT test (stool test). These exams screen for colon cancer.      Have a cholesterol test every 5 years, or more often if advised.    Have a diabetes test (fasting glucose) every three years. If you are at risk for diabetes, you should have this test more often.     If you are at risk for osteoporosis (brittle bone disease), think about having a bone density scan (DEXA).    Shots: Get a flu shot each year. Get a tetanus shot every 10 years.    Nutrition:     Eat at least 5 servings of fruits and vegetables each day.    Eat whole-grain bread, whole-wheat pasta and brown rice instead of white grains and rice.    Get adequate Calcium and Vitamin D.     Lifestyle    Exercise at least 150 minutes a week (30 minutes a day, 5 days a week). This will help you control your weight and prevent disease.    Limit alcohol to one drink per day.    No smoking.     Wear sunscreen to prevent skin cancer.     See your dentist every six months for an exam and cleaning.    See your eye doctor every 1 to 2 years.      It is recommended that you get a vaccination for shingles called Shingrix (given as 2 shots, 2 to 6 months apart), even if you have already had the Zostavax vaccine. Discuss getting the Shingix vaccine from your pharmacist, or schedule an ancillary shot visit here. Some insurances do not cover the cost of these vaccines.

## 2018-11-01 ENCOUNTER — OFFICE VISIT (OUTPATIENT)
Dept: FAMILY MEDICINE | Facility: CLINIC | Age: 63
End: 2018-11-01
Payer: COMMERCIAL

## 2018-11-01 VITALS
RESPIRATION RATE: 18 BRPM | HEIGHT: 65 IN | OXYGEN SATURATION: 92 % | WEIGHT: 166 LBS | BODY MASS INDEX: 27.66 KG/M2 | HEART RATE: 85 BPM | SYSTOLIC BLOOD PRESSURE: 128 MMHG | DIASTOLIC BLOOD PRESSURE: 80 MMHG | TEMPERATURE: 97.7 F

## 2018-11-01 DIAGNOSIS — Z00.00 ROUTINE GENERAL MEDICAL EXAMINATION AT A HEALTH CARE FACILITY: Primary | ICD-10-CM

## 2018-11-01 DIAGNOSIS — G43.909 MIGRAINE WITHOUT STATUS MIGRAINOSUS, NOT INTRACTABLE, UNSPECIFIED MIGRAINE TYPE: ICD-10-CM

## 2018-11-01 DIAGNOSIS — E78.5 HYPERLIPIDEMIA WITH TARGET LDL LESS THAN 130: ICD-10-CM

## 2018-11-01 DIAGNOSIS — Z12.31 VISIT FOR SCREENING MAMMOGRAM: ICD-10-CM

## 2018-11-01 DIAGNOSIS — E03.9 ACQUIRED HYPOTHYROIDISM: ICD-10-CM

## 2018-11-01 LAB
CHOLEST SERPL-MCNC: 151 MG/DL
HDLC SERPL-MCNC: 60 MG/DL
HIV 1+2 AB+HIV1 P24 AG SERPL QL IA: NONREACTIVE
LDLC SERPL CALC-MCNC: 71 MG/DL
NONHDLC SERPL-MCNC: 91 MG/DL
TRIGL SERPL-MCNC: 101 MG/DL
TSH SERPL DL<=0.005 MIU/L-ACNC: 2.2 MU/L (ref 0.4–4)

## 2018-11-01 PROCEDURE — 87389 HIV-1 AG W/HIV-1&-2 AB AG IA: CPT | Performed by: FAMILY MEDICINE

## 2018-11-01 PROCEDURE — 36415 COLL VENOUS BLD VENIPUNCTURE: CPT | Performed by: FAMILY MEDICINE

## 2018-11-01 PROCEDURE — 99396 PREV VISIT EST AGE 40-64: CPT | Performed by: FAMILY MEDICINE

## 2018-11-01 PROCEDURE — 80061 LIPID PANEL: CPT | Performed by: FAMILY MEDICINE

## 2018-11-01 PROCEDURE — 87624 HPV HI-RISK TYP POOLED RSLT: CPT | Performed by: FAMILY MEDICINE

## 2018-11-01 PROCEDURE — 84443 ASSAY THYROID STIM HORMONE: CPT | Performed by: FAMILY MEDICINE

## 2018-11-01 PROCEDURE — G0145 SCR C/V CYTO,THINLAYER,RESCR: HCPCS | Performed by: FAMILY MEDICINE

## 2018-11-01 RX ORDER — SIMVASTATIN 40 MG
TABLET ORAL
Qty: 90 TABLET | Refills: 3 | Status: SHIPPED | OUTPATIENT
Start: 2018-11-01 | End: 2019-12-11

## 2018-11-01 RX ORDER — ALPRAZOLAM 0.25 MG
.25-.5 TABLET ORAL 3 TIMES DAILY PRN
Qty: 8 TABLET | Refills: 0 | Status: CANCELLED | OUTPATIENT
Start: 2018-11-01

## 2018-11-01 RX ORDER — LEVOTHYROXINE SODIUM 112 MCG
TABLET ORAL
Qty: 90 TABLET | Refills: 3 | Status: SHIPPED | OUTPATIENT
Start: 2018-11-01 | End: 2019-11-26

## 2018-11-01 RX ORDER — SUMATRIPTAN 50 MG/1
50-100 TABLET, FILM COATED ORAL
Qty: 9 TABLET | Refills: 11 | Status: SHIPPED | OUTPATIENT
Start: 2018-11-01 | End: 2018-11-06

## 2018-11-01 ASSESSMENT — ENCOUNTER SYMPTOMS
EYE PAIN: 0
NAUSEA: 0
FEVER: 0
SHORTNESS OF BREATH: 0
DYSURIA: 0
PARESTHESIAS: 0
HEMATOCHEZIA: 0
JOINT SWELLING: 0
DIZZINESS: 0
CHILLS: 0
PALPITATIONS: 0
FREQUENCY: 0
DIARRHEA: 0
NERVOUS/ANXIOUS: 0
HEMATURIA: 0
WEAKNESS: 0
BREAST MASS: 0
MYALGIAS: 0
CONSTIPATION: 0
HEADACHES: 0
COUGH: 0
SORE THROAT: 0
HEARTBURN: 0
ARTHRALGIAS: 0
ABDOMINAL PAIN: 0

## 2018-11-01 NOTE — PROGRESS NOTES
SUBJECTIVE:   CC: Jacqueline Banegas is an 63 year old woman  with hypothyroidism and migraines who presents for preventive health visit.     Physical   Annual:     Getting at least 3 servings of Calcium per day:  Yes    Bi-annual eye exam:  Yes    Dental care twice a year:  Yes    Sleep apnea or symptoms of sleep apnea:  None    Diet:  Regular (no restrictions)    Frequency of exercise:  4-5 days/week    Duration of exercise:  30-45 minutes    Taking medications regularly:  Yes    Medication side effects:  Not applicable    Additional concerns today:  No    Today's PHQ-2 Score:   PHQ-2 (  [employer]) 10/30/2018   Q1: Little interest or pleasure in doing things 0   Q2: Feeling down, depressed or hopeless 0   PHQ-2 Score 0   Q1: Little interest or pleasure in doing things Not at all   Q2: Feeling down, depressed or hopeless Not at all   PHQ-2 Score 0       Abuse: Current or Past(Physical, Sexual or Emotional)- No  Do you feel safe in your environment - Yes    Social History   Substance Use Topics     Smoking status: Former Smoker     Packs/day: 0.25     Years: 10.00     Types: Cigarettes     Quit date: 1990     Smokeless tobacco: Never Used     Alcohol use No     Alcohol Use 10/30/2018   If you drink alcohol do you typically have greater than 3 drinks per day OR greater than 7 drinks per week? Not Applicable       Reviewed orders with patient.  Reviewed health maintenance and updated orders accordingly - Yes  Patient Active Problem List   Diagnosis     Hyperlipidemia with target LDL less than 130     Migraines     History of basal cell carcinoma     PFO (patent foramen ovale)     Advanced directives, counseling/discussion     Acquired hypothyroidism     Right knee DJD     History of squamous cell carcinoma in situ of skin     Situational anxiety     Past Surgical History:   Procedure Laterality Date     APPENDECTOMY  1967     BIOPSY      Skin Ca.     C  DELIVERY ONLY  ,,      C  LAMINECTOMY,>2 SGMT,CERVICAL  1995     C SHOULDER SURG PROC UNLISTED  1994    RT     COLONOSCOPY  9/22/2011    Procedure:COLONOSCOPY; Colonoscopy, screening; Surgeon:AUDREY SPENCER; Location:[facility] OR     COLONOSCOPY WITH CO2 INSUFFLATION N/A 12/5/2017    Procedure: COLONOSCOPY WITH CO2 INSUFFLATION;  COLON-SCREENING / MYARAFELD;  Surgeon: Trevor Raygoza MD;  Location: [facility] OR       Social History   Substance Use Topics     Smoking status: Former Smoker     Packs/day: 0.25     Years: 10.00     Types: Cigarettes     Quit date: 1/1/1990     Smokeless tobacco: Never Used     Alcohol use No     Family History   Problem Relation Age of Onset     Cancer - colorectal Mother 65     HEART DISEASE Mother      IHSS     Hypertension Mother      Colon Cancer Mother      Cancer Father      skin     Blood Disease Father      pernicious anemia     Hypertension Father      Hyperlipidemia Father      Asthma Daughter      Cancer Paternal Aunt      uterine     Diabetes Other      cousin, type I     Diabetes Other          Current Outpatient Prescriptions   Medication Sig Dispense Refill     aspirin 81 MG tablet Take 81 mg by mouth daily Reported on 5/9/2017       fluorouracil (EFUDEX) 5 % cream Apply to upper chest, arms, and back bid for 2 weeks 80 g 3     simvastatin (ZOCOR) 40 MG tablet Take 1 tablet by mouth daily 90 tablet 3     SUMAtriptan (IMITREX) 50 MG tablet Take 1-2 tablets ( mg) by mouth at onset of headache for migraine May repeat in 2 hours if needed: max 2/day 9 tablet 11     SYNTHROID 112 MCG tablet Take 1 tablet by mouth daily 90 tablet 3     ALPRAZolam (XANAX) 0.25 MG tablet Take 1-2 tablets (0.25-0.5 mg) by mouth 3 times daily as needed for anxiety . No further refills until follow-up appointment (Patient not taking: Reported on 10/24/2018) 8 tablet 0     Fexofenadine HCl (ALLEGRA PO) Take by mouth as needed for allergies Reported on 5/9/2017       Allergies   Allergen Reactions     Erythromycin Hives      palpitations     Penicillins Rash     palpitations       Patient over age 50, mutual decision to screen reflected in health maintenance.    Pertinent mammograms are reviewed under the imaging tab.  History of abnormal Pap smear: NO - age 30-65 PAP every 5 years with negative HPV co-testing recommended  PAP / HPV 9/3/2013 2010 6/15/2009   PAP NIL NIL NIL     Reviewed and updated as needed this visit by clinical staff  Tobacco  Allergies  Meds  Problems  Med Hx  Surg Hx  Fam Hx  Soc Hx          Reviewed and updated as needed this visit by Provider  Allergies  Meds  Problems        Past Medical History:   Diagnosis Date     Adenomatous goiter      Allergic rhinitis      Basal cell carcinoma ,     scalp, chest, RT upper arm, back     DDD (degenerative disc disease), cervical      History of blood transfusion          Hyperlipidemia LDL goal < 130      Hypothyroidism      Migraine headaches      PFO (patent foramen ovale) 2010     Right knee DJD      Situational anxiety 9/10/2018     Tubular adenoma 2005     Urticaria         Review of Systems   Constitutional: Negative for chills and fever.   HENT: Negative for congestion, ear pain, hearing loss and sore throat.    Eyes: Negative for pain and visual disturbance.   Respiratory: Negative for cough and shortness of breath.    Cardiovascular: Negative for chest pain, palpitations and peripheral edema.   Gastrointestinal: Negative for abdominal pain, constipation, diarrhea, heartburn, hematochezia and nausea.   Breasts:  Negative for tenderness, breast mass and discharge.   Genitourinary: Negative for dysuria, frequency, genital sores, hematuria, pelvic pain, urgency, vaginal bleeding and vaginal discharge.   Musculoskeletal: Negative for arthralgias, joint swelling and myalgias.   Skin: Negative for rash.   Neurological: Negative for dizziness, weakness, headaches and paresthesias.   Psychiatric/Behavioral: Negative for  "mood changes. The patient is not nervous/anxious.      OBJECTIVE:   /80  Pulse 85  Temp 97.7  F (36.5  C) (Oral)  Resp 18  Ht 5' 4.5\" (1.638 m)  Wt 166 lb (75.3 kg)  SpO2 92%  Breastfeeding? No  BMI 28.05 kg/m2  Physical Exam  GENERAL: healthy, alert and no distress  EYES: Eyes grossly normal to inspection, PERRL and conjunctivae and sclerae normal  HENT: ear canals and TM's normal, nose and mouth without ulcers or lesions  NECK: no adenopathy, no asymmetry, masses, or scars and thyroid normal to palpation  RESP: lungs clear to auscultation - no rales, rhonchi or wheezes  BREAST: normal without masses, tenderness or nipple discharge and no palpable axillary masses or adenopathy  CV: regular rate and rhythm, normal S1 S2, no S3 or S4, no murmur, click or rub, no peripheral edema and peripheral pulses strong  ABDOMEN: soft, nontender, no hepatosplenomegaly, no masses and bowel sounds normal  MS: no gross musculoskeletal defects noted, no edema  SKIN: diffuse sun damaged skin; stigmata of recent dermatology procedures   NEURO: Normal strength and tone, mentation intact and speech normal  PSYCH: mentation appears normal, affect normal/bright    Diagnostic Test Results:  Results for orders placed or performed in visit on 07/02/18   Dermatological path order and indications   Result Value Ref Range    Copath Report       Patient Name: MYLENE MAY  MR#: [id number]  Specimen #: [id number]  Collected: 7/2/2018  Received: 7/2/2018  Reported: 7/3/2018 13:02  Ordering Phy(s): RICKY STAFFORD    For improved result formatting, select 'View Enhanced Report Format' under   Linked Documents section.    SPECIMEN(S):  Skin, left lateral posterior leg    FINAL DIAGNOSIS:  Skin, left lateral posterior leg:  - Basal cell carcinoma, superficial type, extending to the lateral margin   - (see description)    I have personally reviewed all specimens  and/or slides, including the   listed special stains, and used " "them  with my medical judgement to determine or confirm the final diagnosis.    Electronically signed out by:  Ron Akins M.D., [facility]    CLINICAL HISTORY:  The patient is a 63 year old female.    GROSS:  The specimen is received in formalin with proper patient identification,   labeled \"L. Lat post leg\" and the  specimen consists of a single, irregular skin shave measuring 0.6 x 0.6   cm.   The skin surface displays no  distinct lesion.  The resection margin is inked black.  It is trisected   and entirely submitted in cassette A1.  (Dictated by: Loraine Herzog 7/2/2018 04:46 PM)    MICROSCOPIC:  The specimen exhibits a tumor of atypical basaloid epithelium arranged as   buds off the epidermal undersurface  with fibrotic stroma and clefting artifact.    CPT Codes:  A: 21080-QS7.P, 17894-HO1.T    TESTING LAB LOCATION:  [facility], [address]   [id number] [phone]    COLLECTION SITE:  Client: [facility]  Location: [facility] ([facility])           ASSESSMENT/PLAN:   (Z00.00) Routine general medical examination at a health care facility  (primary encounter diagnosis)  Plan: HIV Screening, Lipid panel reflex to direct LDL        Fasting, Pap imaged thin layer screen with HPV         - recommended age 30 - 65 years (select HPV         order below), HPV High Risk Types DNA Cervical,        *MA Screening Digital Bilateral          (E03.9) Acquired hypothyroidism  Comment: euthyroid on replacement   Plan: TSH WITH FREE T4 REFLEX, SYNTHROID 112 MCG         tablet          (G43.909) Migraine without status migrainosus, not intractable, unspecified migraine type  Comment: Well controlled with medications without side effects.   Plan: SUMAtriptan (IMITREX) 50 MG tablet         (E78.5) Hyperlipidemia with target LDL less than 130  Comment: Well controlled with medications without side effects.   Plan: Lipid panel reflex to " "direct LDL Fasting, TSH         WITH FREE T4 REFLEX, simvastatin (ZOCOR) 40 MG tablet          COUNSELING:  Reviewed preventive health counseling, as reflected in patient instructions  Special attention given to:        Regular exercise       Healthy diet/nutrition       Vision screening       Aspirin Prophylaxsis       Osteoporosis Prevention/Bone Health       Colon cancer screening       Consider Hep C screening for patients born between 1945 and 1965       HIV screeninx in teen years, 1x in adult years, and at intervals if high risk       The 10-year ASCVD risk score (Irish ANDERSON Jr, et al., 2013) is: 3.9%    Values used to calculate the score:      Age: 63 years      Sex: Female      Is Non- : No      Diabetic: No      Tobacco smoker: No      Systolic Blood Pressure: 128 mmHg      Is BP treated: No      HDL Cholesterol: 59 mg/dL      Total Cholesterol: 171 mg/dL       Advance Care Planning    BP Readings from Last 1 Encounters:   18 128/80     Estimated body mass index is 28.05 kg/(m^2) as calculated from the following:    Height as of this encounter: 5' 4.5\" (1.638 m).    Weight as of this encounter: 166 lb (75.3 kg).      Weight management plan: Discussed healthy diet and exercise guidelines and patient will follow up in 12 months in clinic to re-evaluate.     reports that she quit smoking about 28 years ago. Her smoking use included Cigarettes. She has a 2.50 pack-year smoking history. She has never used smokeless tobacco.      Counseling Resources:  ATP IV Guidelines  Pooled Cohorts Equation Calculator  Breast Cancer Risk Calculator  FRAX Risk Assessment  ICSI Preventive Guidelines  Dietary Guidelines for Americans,   [employer]'s MyPlate  ASA Prophylaxis  Lung CA Screening    Venessa Carter MD  [facility] [place]  Answers for HPI/ROS submitted by the patient on 10/30/2018   PHQ-2 Score: 0    "

## 2018-11-01 NOTE — MR AVS SNAPSHOT
After Visit Summary   11/1/2018    Jacqueline Banegas    MRN: [id number]           Patient Information     Date Of Birth          1955        Visit Information        Provider Department      11/1/2018 9:40 AM Venessa Carter MD [facility]        Today's Diagnoses     Routine general medical examination at a health care facility    -  1    Acquired hypothyroidism        Migraine without status migrainosus, not intractable, unspecified migraine type        Hyperlipidemia with target LDL less than 130        Visit for screening mammogram          Care Instructions    [facility]    If you have any questions regarding to your visit please contact your care team:       Team Red:   Clinic Hours Telephone Number   Dr. Venessa Carlin, NP 7am-7pm  Monday - Thursday   7am-5pm  Fridays  [phone]  (Appointment scheduling available 24/7)   Urgent Care - [place] - 11am-9pm Monday-Friday Saturday-Sunday- 9am-5pm   [place] - 5pm-9pm Monday-Friday Saturday-Sunday- 9am-5pm  [phone] - [place]  [phone] - [place]       What options do I have for a visit other than an office visit? We offer electronic visits (e-visits) and telephone visits, when medically appropriate.  Please check with your medical insurance to see if these types of visits are covered, as you will be responsible for any charges that are not paid by your insurance.      You can use [employer] (secure electronic communication) to access to your chart, send your primary care provider a message, or make an appointment. Ask a team member how to get started.     For a price quote for your services, please call our Consumer Price Line at [phone] or our Imaging Cost estimation line at [phone] (for imaging tests).        Preventive Health Recommendations  Female Ages 50 - 64    Yearly exam: See your health care provider every  year in order to  o Review health changes.   o Discuss preventive care.    o Review your medicines if your doctor has prescribed any.      Get a Pap test every three years (unless you have an abnormal result and your provider advises testing more often).    If you get Pap tests with HPV test, you only need to test every 5 years, unless you have an abnormal result.     You do not need a Pap test if your uterus was removed (hysterectomy) and you have not had cancer.    You should be tested each year for STDs (sexually transmitted diseases) if you're at risk.     Have a mammogram every 1 to 2 years.    Have a colonoscopy at age 50, or have a yearly FIT test (stool test). These exams screen for colon cancer.      Have a cholesterol test every 5 years, or more often if advised.    Have a diabetes test (fasting glucose) every three years. If you are at risk for diabetes, you should have this test more often.     If you are at risk for osteoporosis (brittle bone disease), think about having a bone density scan (DEXA).    Shots: Get a flu shot each year. Get a tetanus shot every 10 years.    Nutrition:     Eat at least 5 servings of fruits and vegetables each day.    Eat whole-grain bread, whole-wheat pasta and brown rice instead of white grains and rice.    Get adequate Calcium and Vitamin D.     Lifestyle    Exercise at least 150 minutes a week (30 minutes a day, 5 days a week). This will help you control your weight and prevent disease.    Limit alcohol to one drink per day.    No smoking.     Wear sunscreen to prevent skin cancer.     See your dentist every six months for an exam and cleaning.    See your eye doctor every 1 to 2 years.      It is recommended that you get a vaccination for shingles called Shingrix (given as 2 shots, 2 to 6 months apart), even if you have already had the Zostavax vaccine. Discuss getting the Shingix vaccine from your pharmacist, or schedule an ancillary shot visit here. Some insurances do  not cover the cost of these vaccines.              Follow-ups after your visit        Follow-up notes from your care team     Return in about 1 year (around 11/1/2019) for Wellness visit.      Your next 10 appointments already scheduled     Dec 19, 2018  7:40 AM CST   Return Visit with Garima Sultana MD   [facility] ([facility]    [address] [place] [id number] [phone]              Future tests that were ordered for you today     Open Future Orders        Priority Expected Expires Ordered    *MA Screening Digital Bilateral Routine  11/1/2019 11/1/2018            Who to contact     If you have questions or need follow up information about today's clinic visit or your schedule please contact [facility] directly at [phone].  Normal or non-critical lab and imaging results will be communicated to you by MyChart, letter or phone within 4 business days after the clinic has received the results. If you do not hear from us within 7 days, please contact the clinic through [employer] or phone. If you have a critical or abnormal lab result, we will notify you by phone as soon as possible.  Submit refill requests through [employer] or call your pharmacy and they will forward the refill request to us. Please allow 3 business days for your refill to be completed.          Additional Information About Your Visit        MyChart Information     [employer] gives you secure access to your electronic health record. If you see a primary care provider, you can also send messages to your care team and make appointments. If you have questions, please call your primary care clinic.  If you do not have a primary care provider, please call [phone] and they will assist you.        Care EveryWhere ID     This is your Care EveryWhere ID. This could be used by other organizations to access your [place] medical records  [id number]        Your Vitals  "Were     Pulse Temperature Respirations Height Pulse Oximetry Breastfeeding?    85 97.7  F (36.5  C) (Oral) 18 5' 4.5\" (1.638 m) 92% No    BMI (Body Mass Index)                   28.05 kg/m2            Blood Pressure from Last 3 Encounters:   11/01/18 128/80   10/24/18 131/86   09/10/18 138/80    Weight from Last 3 Encounters:   11/01/18 166 lb (75.3 kg)   09/10/18 166 lb (75.3 kg)   11/30/17 158 lb (71.7 kg)              We Performed the Following     HIV Screening     HPV High Risk Types DNA Cervical     Lipid panel reflex to direct LDL Fasting     Pap imaged thin layer screen with HPV - recommended age 30 - 65 years (select HPV order below)     TSH WITH FREE T4 REFLEX          Where to get your medicines      These medications were sent to [facility] - [address], [facility] [address]     Phone:  [phone]     simvastatin 40 MG tablet    SUMAtriptan 50 MG tablet    SYNTHROID 112 MCG tablet          Primary Care Provider Office Phone # Fax #    Venessa Carter [phone]       [address] [facility] [address]        Equal Access to Services     MAX ALEMAN AH: Hadii aad ku hadasho Soomaali, waaxda luqadaha, qaybta kaalmada adeegyada, waxay idiin haydorindan giovani heck latanja neil. So [facility] [phone].    ATENCIÓN: Si habla español, tiene a [place] disposición servicios gratuitos de asistencia lingüística. Llame al [phone].    We comply with applicable federal civil rights laws and [place] laws. We do not discriminate on the basis of race, color, national origin, age, disability, sex, sexual orientation, or gender identity.            Thank you!     Thank you for choosing [facility]  for your care. Our goal is always to provide you with excellent care. Hearing back from our patients is one way we can continue to improve our services. Please take a few minutes to complete the written survey that you may " receive in the mail after your visit with us. Thank you!             Your Updated Medication List - Protect others around you: Learn how to safely use, store and throw away your medicines at www.disposemymeds.org.          This list is accurate as of 11/1/18 10:36 AM.  Always use your most recent med list.                   Brand Name Dispense Instructions for use Diagnosis    ALLEGRA PO      Take by mouth as needed for allergies Reported on 5/9/2017        ALPRAZolam 0.25 MG tablet    XANAX    8 tablet    Take 1-2 tablets (0.25-0.5 mg) by mouth 3 times daily as needed for anxiety . No further refills until follow-up appointment    Situational anxiety       aspirin 81 MG tablet      Take 81 mg by mouth daily Reported on 5/9/2017        fluorouracil 5 % cream    EFUDEX    80 g    Apply to upper chest, arms, and back bid for 2 weeks    Superficial basal cell carcinoma, Squamous cell carcinoma in situ       simvastatin 40 MG tablet    ZOCOR    90 tablet    Take 1 tablet by mouth daily    Hyperlipidemia with target LDL less than 130       SUMAtriptan 50 MG tablet    IMITREX    9 tablet    Take 1-2 tablets ( mg) by mouth at onset of headache for migraine May repeat in 2 hours if needed: max 2/day    Migraine without status migrainosus, not intractable, unspecified migraine type       SYNTHROID 112 MCG tablet   Generic drug:  levothyroxine     90 tablet    Take 1 tablet by mouth daily    Acquired hypothyroidism

## 2018-11-02 NOTE — PROGRESS NOTES
Your results are normal.  Your final test results are pending.  Please check your chart again within 3 to 5 days. You will receive further instruction when your full test result panel is complete.    Venessa Carter MD

## 2018-11-05 ENCOUNTER — TELEPHONE (OUTPATIENT)
Dept: FAMILY MEDICINE | Facility: CLINIC | Age: 63
End: 2018-11-05

## 2018-11-05 DIAGNOSIS — G43.909 MIGRAINE WITHOUT STATUS MIGRAINOSUS, NOT INTRACTABLE, UNSPECIFIED MIGRAINE TYPE: ICD-10-CM

## 2018-11-05 LAB
COPATH REPORT: NORMAL
PAP: NORMAL

## 2018-11-05 NOTE — TELEPHONE ENCOUNTER
Per pharmacy, please clarify direction for SUMAtriptan (IMITREX) 50 MG tablet. Max of 2 tabs per day or 2 times per day?  Sulema WEEKS CMA ([facility])

## 2018-11-06 LAB
FINAL DIAGNOSIS: NORMAL
HPV HR 12 DNA CVX QL NAA+PROBE: NEGATIVE
HPV16 DNA SPEC QL NAA+PROBE: NEGATIVE
HPV18 DNA SPEC QL NAA+PROBE: NEGATIVE
SPECIMEN DESCRIPTION: NORMAL
SPECIMEN SOURCE CVX/VAG CYTO: NORMAL

## 2018-11-06 RX ORDER — SUMATRIPTAN 50 MG/1
50-100 TABLET, FILM COATED ORAL
Qty: 9 TABLET | Refills: 11 | Status: SHIPPED | OUTPATIENT
Start: 2018-11-06 | End: 2019-12-11

## 2018-11-06 NOTE — TELEPHONE ENCOUNTER
Signed Prescriptions:                        Disp   Refills    SUMAtriptan (IMITREX) 50 MG tablet         9 tabl*11       Sig: Take 1-2 tablets ( mg) by mouth at onset of           headache for migraine May repeat in 2 hours if           needed: max 200 mg/day  Authorizing Provider: ARTURO GERMAIN

## 2019-02-04 ENCOUNTER — OFFICE VISIT (OUTPATIENT)
Dept: FAMILY MEDICINE | Facility: CLINIC | Age: 64
End: 2019-02-04
Payer: COMMERCIAL

## 2019-02-04 VITALS — SYSTOLIC BLOOD PRESSURE: 138 MMHG | OXYGEN SATURATION: 100 % | HEART RATE: 87 BPM | DIASTOLIC BLOOD PRESSURE: 86 MMHG

## 2019-02-04 DIAGNOSIS — Z85.828 HISTORY OF BASAL CELL CARCINOMA: ICD-10-CM

## 2019-02-04 DIAGNOSIS — L81.4 SOLAR LENTIGO: ICD-10-CM

## 2019-02-04 DIAGNOSIS — Z86.007 HISTORY OF SQUAMOUS CELL CARCINOMA IN SITU OF SKIN: ICD-10-CM

## 2019-02-04 DIAGNOSIS — D22.5 MELANOCYTIC NEVI OF TRUNK: ICD-10-CM

## 2019-02-04 DIAGNOSIS — L91.8 INFLAMED SKIN TAG: Primary | ICD-10-CM

## 2019-02-04 PROCEDURE — 99213 OFFICE O/P EST LOW 20 MIN: CPT | Mod: 25 | Performed by: FAMILY MEDICINE

## 2019-02-04 PROCEDURE — 11200 RMVL SKIN TAGS UP TO&INC 15: CPT | Performed by: FAMILY MEDICINE

## 2019-02-04 NOTE — PROGRESS NOTES
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 Treatments have been excision and topical 5 Fluorouracil for the small superficial basal cell carcinomas.        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 ? 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  Autoimmune : NO    Ancestry : Edith, English, Georgian, Northern     Sun Exposure History  Previous history of significant sun exposure:  Blistering sunburns : YES  Tanning beds : YES - when younger.  Sunscreen Use : YES, SPF : 50.  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    Refer to electronic medical record (EMR) for past medical history and medications.    INTEGUMENTARY/SKIN: POSITIVE for non-healing lesions  ROS : 14 point review of systems was negative except  the symptoms listed above in the HPI.          OBJECTIVE:                                                    GENERAL: healthy, alert and no distress  SKIN: Choudhary Skin Type - I.  This patient was examined from the top of the head to the bottom of the feet  including scalp, face, neck, back, chest, breasts, buttocks, both arms, both legs, both hands, both feet, all 10 fingers and all 10 toes. The dermatoscope was used to help evaluate pigmented lesions.  Skin Pertinent Findings:  Left axilla- Skin:  Benign appearing brown pedunculated skin tags      Procedure :     The were removed  or treated with liquid nitrogen via cryactweezers  after alcohol prep; if hemostasis was needed   was used .  Tissue sample sent in : No  Total number treated : 0-5    Multiple scattered hypopigmented flat scars from previous topical treatment for superficial basal cell carcinoma on the trunk, arms and legs    Multiple brown macules of various sizes and shapes most consistent with (benign) melanocytic nevi on trunk, arms and legs    Multiple brown, macule(s) most consistent with benign solar lentigo on arms            ASSESSMENT:                                                      Encounter Diagnoses   Name Primary?     Inflamed skin tag Yes     History of basal cell carcinoma      Solar lentigo      History of squamous cell carcinoma in situ of skin      Melanocytic nevi of trunk          PLAN:                                                    Patient Instructions   Skin exam in 6 months    The patient was counseled about sunscreens and sun avoidance. The patient was counseled to check the skin regularly and report any lesion that is new, changing, itching, scabbing, bleeding or otherwise bothersome. The patient was discharged ambulatory and in stable condition.      PROCEDURES:                                                    none

## 2019-02-04 NOTE — LETTER
2/4/2019         RE: Jacqueline Banegas  [address]  [place] [address]        Dear Colleague,    Thank you for referring your patient, Jacqueline Banegas, to the [facility]. Please see a copy of my visit note below.    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 Treatments have been excision and topical 5 Fluorouracil for the small superficial basal cell carcinomas.        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 ? 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  Autoimmune : NO    Ancestry : Syriac, English, [place], Northern     Sun Exposure History  Previous history of significant sun exposure:  Blistering sunburns : YES  Tanning beds : YES - when younger.  Sunscreen Use : YES, SPF : 50.  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    Refer to electronic medical record (EMR) for past medical history and medications.    INTEGUMENTARY/SKIN: POSITIVE for non-healing lesions  ROS : 14 point review of systems was negative except the symptoms listed above in the HPI.          OBJECTIVE:                                                    GENERAL: healthy, alert and no distress  SKIN: Choudhary Skin Type - I.  This patient was examined from the top of the head to the bottom of the feet  including scalp, face, neck, back, chest, breasts, buttocks, both arms, both legs, both hands, both feet, all 10 fingers and all 10 toes. The dermatoscope was used to help evaluate pigmented lesions.  Skin Pertinent Findings:  Left axilla- Skin:  Benign appearing brown pedunculated skin tags      Procedure :     The were removed  or treated with liquid nitrogen via cryactweezers  after alcohol prep; if hemostasis was needed   was used .  Tissue sample sent in : No  Total number treated : 0-5    Multiple scattered hypopigmented flat scars from previous topical treatment for superficial basal cell carcinoma on the trunk, arms and legs    Multiple brown macules of various sizes and shapes most consistent with (benign) melanocytic nevi on trunk, arms and legs    Multiple brown, macule(s) most consistent with benign solar lentigo on arms            ASSESSMENT:                                                      Encounter Diagnoses   Name Primary?     Inflamed skin tag Yes     History of basal cell carcinoma      Solar lentigo      History of squamous cell carcinoma in situ of skin      Melanocytic nevi of trunk          PLAN:                                                    Patient Instructions   Skin exam in 6 months    The patient was counseled about sunscreens and sun avoidance. The patient was counseled to check the skin regularly and report any lesion that is new, changing, itching, scabbing, bleeding or otherwise  bothersome. The patient was discharged ambulatory and in stable condition.      PROCEDURES:                                                    none    Again, thank you for allowing me to participate in the care of your patient.        Sincerely,        Garima Sultana MD

## 2019-06-12 ENCOUNTER — OFFICE VISIT (OUTPATIENT)
Dept: FAMILY MEDICINE | Facility: CLINIC | Age: 64
End: 2019-06-12
Payer: COMMERCIAL

## 2019-06-12 VITALS — DIASTOLIC BLOOD PRESSURE: 76 MMHG | SYSTOLIC BLOOD PRESSURE: 138 MMHG
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
## 2019-06-12 PROCEDURE — 99213 OFFICE O/P EST LOW 20 MIN: CPT | Performed by: FAMILY MEDICINE

## 2019-06-12 RX ORDER — TRIAMCINOLONE ACETONIDE 1 MG/G
CREAM TOPICAL 2 TIMES DAILY
Qty: 30 G | Refills: 0 | Status: SHIPPED | OUTPATIENT
Start: 2019-06-12 | End: 2019-10-01

## 2019-06-12 NOTE — LETTER
6/12/2019         RE: Jacqueline Banegas  [address]  [place] [address]        Dear Colleague,    Thank you for referring your patient, Jacqueline Banegas, [facility]. Please see a copy of my visit note below.    [facility] - PRIMARY CARE SKIN    CC: Lesion(s)  SUBJECTIVE:   Jacqueline Banegas is a(n) 64 year old female who presents to clinic today because of a lesion on the left thigh.    Her last full-body skin cancer screening was 2/4/19    Issue One: A lesion on the left posterior thigh may be a boil. It had some fluid collection initially. She does not recall any bites. She has been keeping the area clean.  Onset: 4 weeks.  Enlarging: YES.  Bleeding: NO.  Itchy or irritating: NO.  Pain or tenderness: sometimes tender.  Changing color: NO.    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 ? Skin cancers on leg  6/18/2018 - Superficial basal cell carcinoma on left mid-arm extending to deep and lateral margins  6/18/2018 - Superficial basal cell carcinoma on left lateral mid-upper arm, narrowly excised  6/18/2018 - Nodular basal cell carcinoma on right upper chest, extending to deep margin  6/18/2018 - Superficial basal cell carcinoma on chest midline at T3, narrowly excised  6/18/2018 - Squamous cell carcinoma in situ on left upper chest, narrowly excised     Family history of skin cancer : YES - non-melanoma skin cancer in father and sister.  Autoimmune : NO     Ancestry : Edith, English, [place], Northern      Sun Exposure History  Previous history of significant sun exposure:  Blistering sunburns : YES  Tanning beds : YES - when younger.  Sunscreen Use : YES, SPF : 50.  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     Refer to electronic medical record (EMR) for past medical history and medications.    INTEGUMENTARY/SKIN: POSITIVE for changing lesion  ROS: 14 point review of systems was negative except the symptoms listed above in the HPI.    This document serves as a record of the services and decisions personally performed and made by Garima Sultana MD and was created by Nicho Garay, a trained medical scribe, based on personal observations and provider statements to the medical scribe.  June 12, 2019 8:31 AM   Nicho Garay    OBJECTIVE:   GENERAL: healthy, alert and no distress.  SKIN: Choudhary Skin Type - I.  Legs examined. The dermatoscope was used to help evaluate pigmented lesions.  Skin Pertinent Findings:  Left medial posterior mid-thigh: 7 mm in size raised indurated erythematous lesion with a 2 mm whitish plug.    ASSESSMENT:   No diagnosis found.  .    PLAN:   Patient Instructions   FUTURE APPOINTMENTS  Follow up in 2 weeks.  Follow up on August 12, 2019 for a full-body skin cancer screening.    TOPICAL STEROID INSTRUCTIONS  Triamcinolone 0.1% cream.  Apply two times per day for 10-14 days. Then, use only when needed.  1. Wash hands before applying topical steroid.  2. Apply sparingly (just enough to rub in) onto affected areas of the left thigh.  (Use the adult fingertip unit (FTU) as a guide.) Apply no more than 1 FTU per area.      This higher strength steroid should never be used on face nor groin.    After the initial treatment, topical steroid may be used as needed for flare-ups but only for short-term treatment. If you are using this for prolonged periods of time to control flare-ups, return to clinic for re-evaluation of treatment.    Keep in mind to also regularly use moisturizer, as this preventative measure can help maintain your skin's natural protective moisture  barrier.      TT: 20 minutes.  CT: 15 minutes.    The information in this document, created by the medical scribe for me, accurately reflects the services I personally performed and the decisions made by me. I have reviewed and approved this document for accuracy prior to leaving the patient care area.  June 12, 2019 8:31 AM  Garima Sultana MD  [facility]    Again, thank you for allowing me to participate in the care of your patient.        Sincerely,        Garima Sultana MD

## 2019-06-12 NOTE — PROGRESS NOTES
[facility] - PRIMARY CARE SKIN    CC: Lesion(s)  SUBJECTIVE:   Jacqueline Banegas is a(n) 64 year old female who presents to clinic today because of a lesion on the left thigh.    Her last full-body skin cancer screening was 2/4/19    Issue One: A lesion on the left posterior thigh may be a boil. It had some fluid collection initially. She does not recall any bites. She has been keeping the area clean.  Onset: 4 weeks.  Enlarging: YES.  Bleeding: NO.  Itchy or irritating: NO.  Pain or tenderness: sometimes tender.  Changing color: NO.    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 ? Skin cancers on leg  6/18/2018 - Superficial basal cell carcinoma on left mid-arm extending to deep and lateral margins  6/18/2018 - Superficial basal cell carcinoma on left lateral mid-upper arm, narrowly excised  6/18/2018 - Nodular basal cell carcinoma on right upper chest, extending to deep margin  6/18/2018 - Superficial basal cell carcinoma on chest midline at T3, narrowly excised  6/18/2018 - Squamous cell carcinoma in situ on left upper chest, narrowly excised     Family history of skin cancer : YES - non-melanoma skin cancer in father and sister.  Autoimmune : NO     Ancestry : Latvian, English, [place], [place]      Sun Exposure History  Previous history of significant sun exposure:  Blistering sunburns : YES  Tanning beds : YES - when younger.  Sunscreen Use : YES, SPF : 50.  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     Refer to electronic medical record (EMR) for past medical history and medications.    INTEGUMENTARY/SKIN:  POSITIVE for changing lesion  ROS: 14 point review of systems was negative except the symptoms listed above in the HPI.    This document serves as a record of the services and decisions personally performed and made by Garima Sultana MD and was created by Nicho Garay, a trained medical scribe, based on personal observations and provider statements to the medical scribe.  June 12, 2019 8:31 AM   Nicho Garay    OBJECTIVE:   GENERAL: healthy, alert and no distress.  SKIN: Choudhary Skin Type - I.  Legs examined. The dermatoscope was used to help evaluate pigmented lesions.  Skin Pertinent Findings:  Left medial posterior mid-thigh: 7 mm in size raised indurated erythematous lesion with a 2 mm whitish plug.    ASSESSMENT:   No diagnosis found.  .    PLAN:   Patient Instructions   FUTURE APPOINTMENTS  Follow up in 2 weeks.  Follow up on August 12, 2019 for a full-body skin cancer screening.    TOPICAL STEROID INSTRUCTIONS  Triamcinolone 0.1% cream.  Apply two times per day for 10-14 days. Then, use only when needed.  1. Wash hands before applying topical steroid.  2. Apply sparingly (just enough to rub in) onto affected areas of the left thigh.  (Use the adult fingertip unit (FTU) as a guide.) Apply no more than 1 FTU per area.      This higher strength steroid should never be used on face nor groin.    After the initial treatment, topical steroid may be used as needed for flare-ups but only for short-term treatment. If you are using this for prolonged periods of time to control flare-ups, return to clinic for re-evaluation of treatment.    Keep in mind to also regularly use moisturizer, as this preventative measure can help maintain your skin's natural protective moisture barrier.      TT: 20 minutes.  CT: 15 minutes.    The information in this document, created by the medical scribe for me, accurately reflects the services I personally performed and the decisions made by me. I have reviewed and approved this  document for accuracy prior to leaving the patient care area.  June 12, 2019 8:31 AM  Garima Sultana MD  [facility] [place]

## 2019-06-12 NOTE — PATIENT INSTRUCTIONS
FUTURE APPOINTMENTS  Follow up in 2 weeks.  Follow up on August 12, 2019 for a full-body skin cancer screening.    TOPICAL STEROID INSTRUCTIONS  Triamcinolone 0.1% cream.  Apply two times per day for 10-14 days. Then, use only when needed.  1. Wash hands before applying topical steroid.  2. Apply sparingly (just enough to rub in) onto affected areas of the left thigh.  (Use the adult fingertip unit (FTU) as a guide.) Apply no more than 1 FTU per area.      This higher strength steroid should never be used on face nor groin.    After the initial treatment, topical steroid may be used as needed for flare-ups but only for short-term treatment. If you are using this for prolonged periods of time to control flare-ups, return to clinic for re-evaluation of treatment.    Keep in mind to also regularly use moisturizer, as this preventative measure can help maintain your skin's natural protective moisture barrier.

## 2019-06-26 ENCOUNTER — OFFICE VISIT (OUTPATIENT)
Dept: FAMILY MEDICINE | Facility: CLINIC | Age: 64
End: 2019-06-26
Payer: COMMERCIAL

## 2019-06-26 VITALS — SYSTOLIC BLOOD PRESSURE: 130 MMHG | DIASTOLIC BLOOD PRESSURE: 72 MMHG

## 2019-06-26 DIAGNOSIS — L81.0 POST-INFLAMMATORY HYPERPIGMENTATION: Primary | ICD-10-CM

## 2019-06-26 DIAGNOSIS — L98.9 BENIGN SKIN LESION: ICD-10-CM

## 2019-06-26 PROCEDURE — 99213 OFFICE O/P EST LOW 20 MIN: CPT | Performed by: FAMILY MEDICINE

## 2019-06-26 NOTE — PROGRESS NOTES
[facility] - PRIMARY CARE SKIN    CC: Lesion(s)  SUBJECTIVE:   Jacqueline Banegas is a(n) 64 year old female who presents to clinic today for follow-up of a lesion on the left thigh.    Her last full-body skin cancer screening was 2/4/19.    This lesion on the left posterior thigh began approximately 6 weeks ago with some fluid collection initially. She has applied triamcinolone 0.1% cream over the last two weeks. It has become smaller in size and flatter.    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 ? Skin cancers on leg  6/18/2018 - Superficial basal cell carcinoma on left mid-arm extending to deep and lateral margins  6/18/2018 - Superficial basal cell carcinoma on left lateral mid-upper arm, narrowly excised  6/18/2018 - Nodular basal cell carcinoma on right upper chest, extending to deep margin  6/18/2018 - Superficial basal cell carcinoma on chest midline at T3, narrowly excised  6/18/2018 - Squamous cell carcinoma in situ on left upper chest, narrowly excised     Family history of skin cancer : YES - non-melanoma skin cancer in father and sister.  Autoimmune : NO     Ancestry : Edith, English, Swedish, Northern      Sun Exposure History  Previous history of significant sun exposure:  Blistering sunburns : YES  Tanning beds : YES - when younger.  Sunscreen Use : YES, SPF : 50.  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     Refer to electronic medical record (EMR) for past medical history and medications.    INTEGUMENTARY/SKIN: POSITIVE for changing lesion  ROS: 14 point review of systems was negative except  the symptoms listed above in the HPI.    This document serves as a record of the services and decisions personally performed and made by Garima Sultana MD and was created by Nicho Garay, a trained medical scribe, based on personal observations and provider statements to the medical scribe.  June 26, 2019 8:05 AM   Nicho Garay    OBJECTIVE:   GENERAL: healthy, alert and no distress.  SKIN: Choudhary Skin Type - I.  Legs examined. The dermatoscope was used to help evaluate pigmented lesions.  Skin Pertinent Findings:  Left medial posterior mid-thigh: 7 mm x 4 mm in size violaceous slightly raised lesion most consistent with post-inflammatory hyperpigmentation.    ASSESSMENT:     Encounter Diagnoses   Name Primary?     Post-inflammatory hyperpigmentation Yes     Benign skin lesion          PLAN:   Patient Instructions   FUTURE APPOINTMENTS  8/12/2019 - Full-body skin cancer screening    Continue applying topical triamcinolone 0.1% cream for 2 more weeks.    TT: 20 minutes.  CT: 15 minutes.    The information in this document, created by the medical scribe for me, accurately reflects the services I personally performed and the decisions made by me. I have reviewed and approved this document for accuracy prior to leaving the patient care area.  June 26, 2019 8:05 AM  Garima Sultana MD  [facility] [place]

## 2019-06-26 NOTE — PATIENT INSTRUCTIONS
FUTURE APPOINTMENTS  8/12/2019 - Full-body skin cancer screening    Continue applying topical triamcinolone 0.1% cream for 2 more weeks.

## 2019-06-26 NOTE — LETTER
6/26/2019         RE: Jacqueline Banegas  [address]  [place] [address]        Dear Colleague,    Thank you for referring your patient, Jacqueline Banegas, to the [facility]. Please see a copy of my visit note below.    [facility] - PRIMARY CARE SKIN    CC: Lesion(s)  SUBJECTIVE:   Jacqueline Banegas is a(n) 64 year old female who presents to clinic today for follow-up of a lesion on the left thigh.    Her last full-body skin cancer screening was 2/4/19.    This lesion on the left posterior thigh began approximately 6 weeks ago with some fluid collection initially. She has applied triamcinolone 0.1% cream over the last two weeks. It has become smaller in size and flatter.    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 ? Skin cancers on leg  6/18/2018 - Superficial basal cell carcinoma on left mid-arm extending to deep and lateral margins  6/18/2018 - Superficial basal cell carcinoma on left lateral mid-upper arm, narrowly excised  6/18/2018 - Nodular basal cell carcinoma on right upper chest, extending to deep margin  6/18/2018 - Superficial basal cell carcinoma on chest midline at T3, narrowly excised  6/18/2018 - Squamous cell carcinoma in situ on left upper chest, narrowly excised     Family history of skin cancer : YES - non-melanoma skin cancer in father and sister.  Autoimmune : NO     Ancestry : German, English, Icelandic, Northern      Sun Exposure History  Previous history of significant sun exposure:  Blistering sunburns : YES  Tanning beds : YES - when younger.  Sunscreen Use : YES, SPF : 50.  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     Refer to electronic medical record (EMR) for past medical history and medications.    INTEGUMENTARY/SKIN: POSITIVE for changing lesion  ROS: 14 point review of systems was negative except the symptoms listed above in the HPI.    This document serves as a record of the services and decisions personally performed and made by Garima Sultana MD and was created by Nicho Garay, a trained medical scribe, based on personal observations and provider statements to the medical scribe.  June 26, 2019 8:05 AM   Nicho Garay    OBJECTIVE:   GENERAL: healthy, alert and no distress.  SKIN: Choudhary Skin Type - I.  Legs examined. The dermatoscope was used to help evaluate pigmented lesions.  Skin Pertinent Findings:  Left medial posterior mid-thigh: 7 mm x 4 mm in size violaceous slightly raised lesion most consistent with post-inflammatory hyperpigmentation.    ASSESSMENT:     Encounter Diagnoses   Name Primary?     Post-inflammatory hyperpigmentation Yes     Benign skin lesion          PLAN:   Patient Instructions   FUTURE APPOINTMENTS  8/12/2019 - Full-body skin cancer screening    Continue applying topical triamcinolone 0.1% cream for 2 more weeks.    TT: 20 minutes.  CT: 15 minutes.    The information in this document, created by the medical scribe for me, accurately reflects the services I personally performed and the decisions made by me. I have reviewed and approved this document for accuracy prior to leaving the patient care area.  June 26, 2019 8:05 AM  Garima Sultana MD  [facility] [place]     Again, thank you for allowing me to participate in the care of your patient.        Sincerely,        Garima Sultana MD

## 2019-09-17 NOTE — PROGRESS NOTES
[facility] - PRIMARY CARE SKIN    CC: Lesion(s)  SUBJECTIVE:   Jacqueline Banegas is a(n) 64 year old female who presents to clinic today for skin exam    Issues : spot on the mid forehead and bra line . No bleeding, tenderness or enlargement.    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 ? Skin cancers on leg  6/18/2018 - Superficial basal cell carcinoma on left mid-arm extending to deep and lateral margins  6/18/2018 - Superficial basal cell carcinoma on left lateral mid-upper arm, narrowly excised  6/18/2018 - Nodular basal cell carcinoma on right upper chest, extending to deep margin  6/18/2018 - Superficial basal cell carcinoma on chest midline at T3, narrowly excised  6/18/2018 - Squamous cell carcinoma in situ on left upper chest, narrowly excised     Family history of skin cancer : YES - non-melanoma skin cancer in father and sister.  Autoimmune : NO     Ancestry : [place]guillan, English, [place], Northern      Sun Exposure History  Previous history of significant sun exposure:  Blistering sunburns : YES  Tanning beds : YES - when younger.  Sunscreen Use : YES, SPF : 50.  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     Refer to electronic medical record (EMR) for past medical history and medications.    INTEGUMENTARY/SKIN: POSITIVE for changing lesion  ROS: 14 point review of systems was negative except the symptoms listed above in the HPI.    OBJECTIVE:   GENERAL: healthy, alert and no distress.  SKIN: Choudhary Skin Type - I.  Legs examined. The dermatoscope was used to help evaluate pigmented lesions.  Skin  Pertinent Findings:                          Right lateral chest wall- 7 mm x 3 mm raised, smooth pink lesion ? Irritated seborrheic keratosis  ? Basal cell carcinoma ? other r                                   Midline  upper forehead - 4 mm X 2 mm smooth ,raised pink lesion ? Scar ? Basal cell carcinoma ? Other                             Trunk, arms , legs  :            Brown, macule(s) most consistent with benign solar lentigo            Raised, coarse textured, stuck appearing lesion consistent with seborrheic keratosis .            Slightly raised, red lesion(s) consistent with capillary hemangioma            Brown macules of various sizes and shapes most consistent with (benign) melanocytic nevi                          Left mid arm- faintly erythematous, nonindurated lesion, 5 mm continue to watch    ASSESSMENT:     Encounter Diagnoses   Name Primary?     Neoplasm of uncertain behavior of skin Yes     Melanocytic nevi of trunk      History of squamous cell carcinoma in situ of skin      History of basal cell carcinoma          PLAN:   Patient Instructions   FUTURE APPOINTMENTS  Follow up per pathology report.   Vaseline and a bandage every day.   Recommendation for a skin exam in 6 months.        WOUND CARE INSTRUCTIONS  1. Wash hands before every dressing change.  2. After 24 hours, change dressing daily.  3. Wash the wound area with a mild soap, then rinse.  4. Gently pat dry with a sterile gauze or Q-tip.  5. Using a Q-tip, apply Vaseline or Aquaphor only over entire wound. Do NOT use Neosporin - as many people react to neomycin.  6. Finally, cover with a bandage or sterile non-stick gauze with micropore paper tape.  7. Repeat once daily until wound has healed.      Soap, water and shampoo will not hurt this area.    Do not go swimming or take baths, but showering is encouraged.    Limit use of the area where the procedure was done for a few days to allow for optimal healing.    If you experience  "bleeding:  Wash hands and hold firm pressure on the area for 10 minutes without checking to see if the bleeding has stopped. \"Checking\" pulls off the protective wound clot and restarts the bleeding all over again. Re-apply pressure for 10 minutes if necessary to stop bleeding.  Use additional sterile gauze and tape to maintain pressure once bleeding has stopped.  If bleeding continues, then call back to clinic at [phone].    Signs of Infection:  Infection can occur in any area where skin has been disrupted.  If you notice persistent redness, swelling, colored drainage, increasing pain, fever or other signs of infection, please call us at: [phone] and ask to have me or my colleague paged. We will call you back to discuss.    Pathology Results:  You will be notified, generally via letter or MyChart, in approximately 10 days. If there is anything we need to discuss or further treatment needed, I will call you to discuss it.    PATIENT INFORMATION : WOUNDS  During the healing process you will notice a number of changes. All wounds develop a small halo of redness surrounding the wound.  This means healing is occurring. Severe itching with extensive redness usually indicates sensitivity to the ointment or bandage tape used to dress the wound.  You should call our office if this develops.      Swelling  and/or discoloration around your surgical site is common, particularly when performed around the eye.    All wounds normally drain.  The larger the wound the more drainage there will be.  After 7-10 days, you will notice the wound beginning to shrink and new skin will begin to grow.  The wound is healed when you can see skin has formed over the entire area.  A healed wound has a healthy, shiny look to the surface and is red to dark pink in color to normalize.  Wounds may take approximately 4-6 weeks to heal.  Larger wounds may take 6-8 weeks. After the wound is healed you may discontinue dressing " "changes.    You may experience a sensation of tightness as your wound heals. This is normal and will gradually subside.    Your healed wound may be sensitive to temperature changes. This sensitivity improves with time, but if you re having a lot of discomfort, try to avoid temperature extremes.    Patients frequently experience itching after their wound appears to have healed because of the continue healing under the skin.  Plain Vaseline will help relieve the itching.      SUN PROTECTION INSTRUCTIONS  Sun damage can lead to skin cancer and premature aging of the skin.      The best way to protect from sun damage to your skin is to avoid the sun during peak hours (10 am - 2 pm) even on overcast days.    Never use tanning beds. Tanning beds are associated with much higher risks of skin cancer.    All tanning damages the skin. Aim for ivory skin year round and you will have less trouble with your skin in years to come. There is no merit in getting \"a base tan\" before a warm weather vacation, as any tanning indicates your body's response to sun damage.    Stop smoking. Smokers have higher rates of skin cancer and also have premature skin wrinkling.    Use UPF sun-protective clothing, which while more expensive initially provides longer lasting coverage without having to worry about remembering to re-apply.  1. Wear a wide-brimmed hat and sunglasses.   2. Wear sun-protective clothing.  [employer] and other [employer] make sun protective clothing that are stylish, comfortable and cool.   [employer] and other [employer] make UV arm sleeves suitable for golfing, gardening and other activities.    Sunscreen instructions:  1. Use sunscreens with Zinc Oxide, Titanium Dioxide or Avobenzone to protect from UVA rays.  2. Use SPF 30-50+ to protect from UVB rays.  3. Re-apply every 2 hours even if water resistant.  4. Apply on your face every day even when cloudy and even in the winter. UVA \"aging rays\" penetrate window " glass and are just as strong in the winter as in the summer.    FYI  You should use about 3 tablespoons of sunscreen to protect your whole body. Thus a typical eight ounce bottle of sunscreen should last 4 applications. Remember, that the SPF rating is compromised if you don t apply enough. Most people only apply 1/2 - 1/3 of the amount they need. Also don t forget areas such as your ears, feet, upper back and harder to reach places. Keep in mind that these amounts should be increased for larger body sizes.    Sunscreens with titanium dioxide and/or zinc oxide in the active ingredients are physical blockers as opposed to chemical blockers. Chemical-free sunscreens should not irritate the skin.    Spray-on sunscreens may be used for touch-up application only, not as a base layer. Also, use with caution around small children due to inhalation risk.    SPF means sun protection factor, which is just the degree to which the sunscreen can protect against UVB rays. There is no rating system for UVA rays. SPF is calculated as the time skin will burn when sunscreen is applied vs. skin without sunscreen.    Water resistant sunscreens should be re-applied every 1-2 hours.    Product Recommendations:    Consider use of sunscreen sticks with Zinc Oxide and Titanium Dioxide active ingredients such as Neutrogena Pure&Free Baby Sunscreen Stick.    Good examples include: Blue Lizard, EltaMD, Solbar    Good daily moisturizers with SPF: Vanicream, CeraVe.    For sensitive skin, consider : SkinMedica Essential Defense Mineral Shield Broad Spectrum SPF 35    Men: consider use of Neutrogena Triple Protect Facial Lotion    Avoid retinyl palmitate products.  Avoid combination products that include both sunscreen and insect repellant, as sunscreen should be applied every 2 hours, but insect repellant should not be applied as frequently.    For more  information:  https://www.skincancer.org/prevention/sun-protection/sunscreen/sunscreens-safe-and-effective      SKIN CANCER SELF-EXAM INSTRUCTIONS  Check every month in the mirror or with a household member. Be aware of any changes, especially bleeding or tenderness. Also, make sure to check your nails for color changes after removal of nail polish.    For melanoma, check for:  A - Asymmetry. One half unlike the other half.  B - Border. Irregular, scalloped, ragged, notched, blurred or poorly defined borders.  C - Color. Color variations from one area to another, with shades of tan, brown and/or black present. Sometimes white, red or blue.  D - Diameter. Greater than 6 mm (about the size of a pencil eraser). Any new growth of a mole should be concerning and be evaluated.  E - Evolving. A mole or skin lesion that looks different from the rest or is changing in size, shape or color.    For basal cell carcinoma and squamous cell carcinoma, check for:    Sores, shiny bumps, nodules, scaly lesions, or wart-like growths that are itchy, tender, crusting, scabbing, eroding, oozing or bleeding.    Open sores/wounds or reddish/irritated areas that do not heal within 2-3 weeks.    Scar-like areas that are white, yellow or waxy in color.    Also, check your lymph nodes for tenderness or swelling every month. Check front of neck, back of neck, over the collarbone, and in the armpits. (Note that if you have a cold or other illness, they may be swollen)        TT: 20 minutes.  CT: 15 minutes.

## 2019-09-18 ENCOUNTER — OFFICE VISIT (OUTPATIENT)
Dept: FAMILY MEDICINE | Facility: CLINIC | Age: 64
End: 2019-09-18
Payer: COMMERCIAL

## 2019-09-18 VITALS — DIASTOLIC BLOOD PRESSURE: 64 MMHG | SYSTOLIC BLOOD PRESSURE: 126 MMHG

## 2019-09-18 DIAGNOSIS — D48.5 NEOPLASM OF UNCERTAIN BEHAVIOR OF SKIN: Primary | ICD-10-CM

## 2019-09-18 DIAGNOSIS — Z86.007 HISTORY OF SQUAMOUS CELL CARCINOMA IN SITU OF SKIN: ICD-10-CM

## 2019-09-18 DIAGNOSIS — Z85.828 HISTORY OF BASAL CELL CARCINOMA: ICD-10-CM

## 2019-09-18 DIAGNOSIS — D22.5 MELANOCYTIC NEVI OF TRUNK: ICD-10-CM

## 2019-09-18 PROCEDURE — 88305 TISSUE EXAM BY PATHOLOGIST: CPT | Mod: TC | Performed by: FAMILY MEDICINE

## 2019-09-18 PROCEDURE — 11301 SHAVE SKIN LESION 0.6-1.0 CM: CPT | Performed by: FAMILY MEDICINE

## 2019-09-18 PROCEDURE — 11310 SHAVE SKIN LESION 0.5 CM/<: CPT | Mod: 51 | Performed by: FAMILY MEDICINE

## 2019-09-18 PROCEDURE — 99213 OFFICE O/P EST LOW 20 MIN: CPT | Mod: 25 | Performed by: FAMILY MEDICINE

## 2019-09-18 NOTE — PATIENT INSTRUCTIONS
"FUTURE APPOINTMENTS  Follow up per pathology report.   Vaseline and a bandage every day.   Recommendation for a skin exam in 6 months.        WOUND CARE INSTRUCTIONS  1. Wash hands before every dressing change.  2. After 24 hours, change dressing daily.  3. Wash the wound area with a mild soap, then rinse.  4. Gently pat dry with a sterile gauze or Q-tip.  5. Using a Q-tip, apply Vaseline or Aquaphor only over entire wound. Do NOT use Neosporin - as many people react to neomycin.  6. Finally, cover with a bandage or sterile non-stick gauze with micropore paper tape.  7. Repeat once daily until wound has healed.      Soap, water and shampoo will not hurt this area.    Do not go swimming or take baths, but showering is encouraged.    Limit use of the area where the procedure was done for a few days to allow for optimal healing.    If you experience bleeding:  Wash hands and hold firm pressure on the area for 10 minutes without checking to see if the bleeding has stopped. \"Checking\" pulls off the protective wound clot and restarts the bleeding all over again. Re-apply pressure for 10 minutes if necessary to stop bleeding.  Use additional sterile gauze and tape to maintain pressure once bleeding has stopped.  If bleeding continues, then call back to clinic at [phone].    Signs of Infection:  Infection can occur in any area where skin has been disrupted.  If you notice persistent redness, swelling, colored drainage, increasing pain, fever or other signs of infection, please call us at: [phone] and ask to have me or my colleague paged. We will call you back to discuss.    Pathology Results:  You will be notified, generally via letter or MyChart, in approximately 10 days. If there is anything we need to discuss or further treatment needed, I will call you to discuss it.    PATIENT INFORMATION : WOUNDS  During the healing process you will notice a number of changes. All wounds develop a small halo of redness " "surrounding the wound.  This means healing is occurring. Severe itching with extensive redness usually indicates sensitivity to the ointment or bandage tape used to dress the wound.  You should call our office if this develops.      Swelling  and/or discoloration around your surgical site is common, particularly when performed around the eye.    All wounds normally drain.  The larger the wound the more drainage there will be.  After 7-10 days, you will notice the wound beginning to shrink and new skin will begin to grow.  The wound is healed when you can see skin has formed over the entire area.  A healed wound has a healthy, shiny look to the surface and is red to dark pink in color to normalize.  Wounds may take approximately 4-6 weeks to heal.  Larger wounds may take 6-8 weeks. After the wound is healed you may discontinue dressing changes.    You may experience a sensation of tightness as your wound heals. This is normal and will gradually subside.    Your healed wound may be sensitive to temperature changes. This sensitivity improves with time, but if you re having a lot of discomfort, try to avoid temperature extremes.    Patients frequently experience itching after their wound appears to have healed because of the continue healing under the skin.  Plain Vaseline will help relieve the itching.      SUN PROTECTION INSTRUCTIONS  Sun damage can lead to skin cancer and premature aging of the skin.      The best way to protect from sun damage to your skin is to avoid the sun during peak hours (10 am - 2 pm) even on overcast days.    Never use tanning beds. Tanning beds are associated with much higher risks of skin cancer.    All tanning damages the skin. Aim for ivory skin year round and you will have less trouble with your skin in years to come. There is no merit in getting \"a base tan\" before a warm weather vacation, as any tanning indicates your body's response to sun damage.    Stop smoking. Smokers have higher " "rates of skin cancer and also have premature skin wrinkling.    Use UPF sun-protective clothing, which while more expensive initially provides longer lasting coverage without having to worry about remembering to re-apply.  1. Wear a wide-brimmed hat and sunglasses.   2. Wear sun-protective clothing.  [employer] and other [employer] make sun protective clothing that are stylish, comfortable and cool.   [employer] and other [employer] make UV arm sleeves suitable for golfing, gardening and other activities.    Sunscreen instructions:  1. Use sunscreens with Zinc Oxide, Titanium Dioxide or Avobenzone to protect from UVA rays.  2. Use SPF 30-50+ to protect from UVB rays.  3. Re-apply every 2 hours even if water resistant.  4. Apply on your face every day even when cloudy and even in the winter. UVA \"aging rays\" penetrate window glass and are just as strong in the winter as in the summer.    FYI  You should use about 3 tablespoons of sunscreen to protect your whole body. Thus a typical eight ounce bottle of sunscreen should last 4 applications. Remember, that the SPF rating is compromised if you don t apply enough. Most people only apply 1/2 - 1/3 of the amount they need. Also don t forget areas such as your ears, feet, upper back and harder to reach places. Keep in mind that these amounts should be increased for larger body sizes.    Sunscreens with titanium dioxide and/or zinc oxide in the active ingredients are physical blockers as opposed to chemical blockers. Chemical-free sunscreens should not irritate the skin.    Spray-on sunscreens may be used for touch-up application only, not as a base layer. Also, use with caution around small children due to inhalation risk.    SPF means sun protection factor, which is just the degree to which the sunscreen can protect against UVB rays. There is no rating system for UVA rays. SPF is calculated as the time skin will burn when sunscreen is applied vs. skin without " sunscreen.    Water resistant sunscreens should be re-applied every 1-2 hours.    Product Recommendations:    Consider use of sunscreen sticks with Zinc Oxide and Titanium Dioxide active ingredients such as Neutrogena Pure&Free Baby Sunscreen Stick.    Good examples include: Blue Lizard, EltaMD, Solbar    Good daily moisturizers with SPF: Vanicream, CeraVe.    For sensitive skin, consider : SkinMedica Essential Defense Mineral Shield Broad Spectrum SPF 35    Men: consider use of Neutrogena Triple Protect Facial Lotion    Avoid retinyl palmitate products.  Avoid combination products that include both sunscreen and insect repellant, as sunscreen should be applied every 2 hours, but insect repellant should not be applied as frequently.    For more information:  https://www.skincancer.org/prevention/sun-protection/sunscreen/sunscreens-safe-and-effective      SKIN CANCER SELF-EXAM INSTRUCTIONS  Check every month in the mirror or with a household member. Be aware of any changes, especially bleeding or tenderness. Also, make sure to check your nails for color changes after removal of nail polish.    For melanoma, check for:  A - Asymmetry. One half unlike the other half.  B - Border. Irregular, scalloped, ragged, notched, blurred or poorly defined borders.  C - Color. Color variations from one area to another, with shades of tan, brown and/or black present. Sometimes white, red or blue.  D - Diameter. Greater than 6 mm (about the size of a pencil eraser). Any new growth of a mole should be concerning and be evaluated.  E - Evolving. A mole or skin lesion that looks different from the rest or is changing in size, shape or color.    For basal cell carcinoma and squamous cell carcinoma, check for:    Sores, shiny bumps, nodules, scaly lesions, or wart-like growths that are itchy, tender, crusting, scabbing, eroding, oozing or bleeding.    Open sores/wounds or reddish/irritated areas that do not heal within 2-3  weeks.    Scar-like areas that are white, yellow or waxy in color.    Also, check your lymph nodes for tenderness or swelling every month. Check front of neck, back of neck, over the collarbone, and in the armpits. (Note that if you have a cold or other illness, they may be swollen)

## 2019-09-18 NOTE — PROCEDURES
Name : Shave Excision  Indication : Excision of tissue for pathology evaluation.  Location(s) :                         Midline  upper forehead - 4 mm X 2 mm smooth ,raised pink lesion ? Scar ? Basal cell carcinoma ? Other  Completed by : Katlyn Sultana MD  Photo Taken : yes.  Anesthesia : Patient was anesthetized by infiltrating the area surrounding the lesion with 1% lidocaine.   epinephrine [id number] : Yes.  Note : Discussed the risk of pain, infection, scarring, hypo- or hyperpigmentation and recurrence or need for re-treatment. The benefits of treatment and alternative treatments were also discussed.    During this procedure, the universal protocol was utilized. The patient's identity was confirmed by no less than two patient identifiers, correct procedure was verified, correct site was verified and marked as applicable and a final pause was completed.    Sterile technique was used throughout the procedure. The skin was cleaned and prepped with surgical cleanser. Once adequate anesthesia was obtained, the lesion was removed with a deep scallop shave procedure. The specimen was sent to pathology.    Direct pressure and aluminum chloride and monopolar cautery was applied for hemostasis. No bleeding was present upon the completion of the procedure. The wound was coated with antibacterial ointment. A dry sterile dressing was applied. Patient tolerated the procedure well and left in satisfactory condition.    Primary provider and referring provider will be informed regarding the tissue report when it returns.      Name : Shave Excision  Indication : Excision of tissue for pathology evaluation.  Location(s) : Right lateral chest wall- 7 mm x 3 mm raised, smooth pink lesion ? Irritated seborrheic keratosis  ? Basal cell carcinoma ? other  Completed by : Katlyn Sultana MD  Photo Taken : yes.  Anesthesia : Patient was anesthetized by infiltrating the area surrounding the lesion with 1% lidocaine.   epinephrine [id number] :  Yes.  Note : Discussed the risk of pain, infection, scarring, hypo- or hyperpigmentation and recurrence or need for re-treatment. The benefits of treatment and alternative treatments were also discussed.    During this procedure, the universal protocol was utilized. The patient's identity was confirmed by no less than two patient identifiers, correct procedure was verified, correct site was verified and marked as applicable and a final pause was completed.    Sterile technique was used throughout the procedure. The skin was cleaned and prepped with surgical cleanser. Once adequate anesthesia was obtained, the lesion was removed with a deep scallop shave procedure. The specimen was sent to pathology.    Direct pressure and aluminum chloride and monopolar cautery was applied for hemostasis. No bleeding was present upon the completion of the procedure. The wound was coated with antibacterial ointment. A dry sterile dressing was applied. Patient tolerated the procedure well and left in satisfactory condition.    Primary provider and referring provider will be informed regarding the tissue report when it returns.

## 2019-09-18 NOTE — LETTER
9/18/2019         RE: Jacqueline Banegas  [address]  [place] [address]        Dear Colleague,    Thank you for referring your patient, [facility]. Please see a copy of my visit note below.    [facility] - PRIMARY CARE SKIN    CC: Lesion(s)  SUBJECTIVE:   Jacqueline Banegas is a(n) 64 year old female who presents to clinic today for skin exam    Issues : spot on the mid forehead and bra line . No bleeding, tenderness or enlargement.    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 ? Skin cancers on leg  6/18/2018 - Superficial basal cell carcinoma on left mid-arm extending to deep and lateral margins  6/18/2018 - Superficial basal cell carcinoma on left lateral mid-upper arm, narrowly excised  6/18/2018 - Nodular basal cell carcinoma on right upper chest, extending to deep margin  6/18/2018 - Superficial basal cell carcinoma on chest midline at T3, narrowly excised  6/18/2018 - Squamous cell carcinoma in situ on left upper chest, narrowly excised     Family history of skin cancer : YES - non-melanoma skin cancer in father and sister.  Autoimmune : NO     Ancestry : Edith, English, Vietnamese, Northern      Sun Exposure History  Previous history of significant sun exposure:  Blistering sunburns : YES  Tanning beds : YES - when younger.  Sunscreen Use : YES, SPF : 50.  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     Refer to electronic medical record (EMR) for past medical history and medications.    INTEGUMENTARY/SKIN: POSITIVE for changing lesion  ROS: 14 point review of  systems was negative except the symptoms listed above in the HPI.    OBJECTIVE:   GENERAL: healthy, alert and no distress.  SKIN: Choudhary Skin Type - I.  Legs examined. The dermatoscope was used to help evaluate pigmented lesions.  Skin Pertinent Findings:                          Right lateral chest wall- 7 mm x 3 mm raised, smooth pink lesion ? Irritated seborrheic keratosis  ? Basal cell carcinoma ? other r                                   Midline  upper forehead - 4 mm X 2 mm smooth ,raised pink lesion ? Scar ? Basal cell carcinoma ? Other                             Trunk, arms , legs  :            Brown, macule(s) most consistent with benign solar lentigo            Raised, coarse textured, stuck appearing lesion consistent with seborrheic keratosis .            Slightly raised, red lesion(s) consistent with capillary hemangioma            Brown macules of various sizes and shapes most consistent with (benign) melanocytic nevi                          Left mid arm- faintly erythematous, nonindurated lesion, 5 mm continue to watch    ASSESSMENT:     Encounter Diagnoses   Name Primary?     Neoplasm of uncertain behavior of skin Yes     Melanocytic nevi of trunk      History of squamous cell carcinoma in situ of skin      History of basal cell carcinoma          PLAN:   Patient Instructions   FUTURE APPOINTMENTS  Follow up per pathology report.   Vaseline and a bandage every day.   Recommendation for a skin exam in 6 months.        WOUND CARE INSTRUCTIONS  1. Wash hands before every dressing change.  2. After 24 hours, change dressing daily.  3. Wash the wound area with a mild soap, then rinse.  4. Gently pat dry with a sterile gauze or Q-tip.  5. Using a Q-tip, apply Vaseline or Aquaphor only over entire wound. Do NOT use Neosporin - as many people react to neomycin.  6. Finally, cover with a bandage or sterile non-stick gauze with micropore paper tape.  7. Repeat once daily until wound has  "healed.      Soap, water and shampoo will not hurt this area.    Do not go swimming or take baths, but showering is encouraged.    Limit use of the area where the procedure was done for a few days to allow for optimal healing.    If you experience bleeding:  Wash hands and hold firm pressure on the area for 10 minutes without checking to see if the bleeding has stopped. \"Checking\" pulls off the protective wound clot and restarts the bleeding all over again. Re-apply pressure for 10 minutes if necessary to stop bleeding.  Use additional sterile gauze and tape to maintain pressure once bleeding has stopped.  If bleeding continues, then call back to clinic at [phone].    Signs of Infection:  Infection can occur in any area where skin has been disrupted.  If you notice persistent redness, swelling, colored drainage, increasing pain, fever or other signs of infection, please call us at: [phone] and ask to have me or my colleague paged. We will call you back to discuss.    Pathology Results:  You will be notified, generally via letter or MyChart, in approximately 10 days. If there is anything we need to discuss or further treatment needed, I will call you to discuss it.    PATIENT INFORMATION : WOUNDS  During the healing process you will notice a number of changes. All wounds develop a small halo of redness surrounding the wound.  This means healing is occurring. Severe itching with extensive redness usually indicates sensitivity to the ointment or bandage tape used to dress the wound.  You should call our office if this develops.      Swelling  and/or discoloration around your surgical site is common, particularly when performed around the eye.    All wounds normally drain.  The larger the wound the more drainage there will be.  After 7-10 days, you will notice the wound beginning to shrink and new skin will begin to grow.  The wound is healed when you can see skin has formed over the entire area.  A healed " "wound has a healthy, shiny look to the surface and is red to dark pink in color to normalize.  Wounds may take approximately 4-6 weeks to heal.  Larger wounds may take 6-8 weeks. After the wound is healed you may discontinue dressing changes.    You may experience a sensation of tightness as your wound heals. This is normal and will gradually subside.    Your healed wound may be sensitive to temperature changes. This sensitivity improves with time, but if you re having a lot of discomfort, try to avoid temperature extremes.    Patients frequently experience itching after their wound appears to have healed because of the continue healing under the skin.  Plain Vaseline will help relieve the itching.      SUN PROTECTION INSTRUCTIONS  Sun damage can lead to skin cancer and premature aging of the skin.      The best way to protect from sun damage to your skin is to avoid the sun during peak hours (10 am - 2 pm) even on overcast days.    Never use tanning beds. Tanning beds are associated with much higher risks of skin cancer.    All tanning damages the skin. Aim for ivory skin year round and you will have less trouble with your skin in years to come. There is no merit in getting \"a base tan\" before a warm weather vacation, as any tanning indicates your body's response to sun damage.    Stop smoking. Smokers have higher rates of skin cancer and also have premature skin wrinkling.    Use UPF sun-protective clothing, which while more expensive initially provides longer lasting coverage without having to worry about remembering to re-apply.  1. Wear a wide-brimmed hat and sunglasses.   2. Wear sun-protective clothing.  [employer] and other [employer] make sun protective clothing that are stylish, comfortable and cool.   [employer] and other [employer] make UV arm sleeves suitable for golfing, gardening and other activities.    Sunscreen instructions:  1. Use sunscreens with Zinc Oxide, Titanium Dioxide or " "Avobenzone to protect from UVA rays.  2. Use SPF 30-50+ to protect from UVB rays.  3. Re-apply every 2 hours even if water resistant.  4. Apply on your face every day even when cloudy and even in the winter. UVA \"aging rays\" penetrate window glass and are just as strong in the winter as in the summer.    FYI  You should use about 3 tablespoons of sunscreen to protect your whole body. Thus a typical eight ounce bottle of sunscreen should last 4 applications. Remember, that the SPF rating is compromised if you don t apply enough. Most people only apply 1/2 - 1/3 of the amount they need. Also don t forget areas such as your ears, feet, upper back and harder to reach places. Keep in mind that these amounts should be increased for larger body sizes.    Sunscreens with titanium dioxide and/or zinc oxide in the active ingredients are physical blockers as opposed to chemical blockers. Chemical-free sunscreens should not irritate the skin.    Spray-on sunscreens may be used for touch-up application only, not as a base layer. Also, use with caution around small children due to inhalation risk.    SPF means sun protection factor, which is just the degree to which the sunscreen can protect against UVB rays. There is no rating system for UVA rays. SPF is calculated as the time skin will burn when sunscreen is applied vs. skin without sunscreen.    Water resistant sunscreens should be re-applied every 1-2 hours.    Product Recommendations:    Consider use of sunscreen sticks with Zinc Oxide and Titanium Dioxide active ingredients such as Neutrogena Pure&Free Baby Sunscreen Stick.    Good examples include: Blue Lizard, EltaMD, Solbar    Good daily moisturizers with SPF: Vanicream, CeraVe.    For sensitive skin, consider : SkinMedica Essential Defense Mineral Shield Broad Spectrum SPF 35    Men: consider use of Neutrogena Triple Protect Facial Lotion    Avoid retinyl palmitate products.  Avoid combination products that include both " sunscreen and insect repellant, as sunscreen should be applied every 2 hours, but insect repellant should not be applied as frequently.    For more information:  https://www.skincancer.org/prevention/sun-protection/sunscreen/sunscreens-safe-and-effective      SKIN CANCER SELF-EXAM INSTRUCTIONS  Check every month in the mirror or with a household member. Be aware of any changes, especially bleeding or tenderness. Also, make sure to check your nails for color changes after removal of nail polish.    For melanoma, check for:  A - Asymmetry. One half unlike the other half.  B - Border. Irregular, scalloped, ragged, notched, blurred or poorly defined borders.  C - Color. Color variations from one area to another, with shades of tan, brown and/or black present. Sometimes white, red or blue.  D - Diameter. Greater than 6 mm (about the size of a pencil eraser). Any new growth of a mole should be concerning and be evaluated.  E - Evolving. A mole or skin lesion that looks different from the rest or is changing in size, shape or color.    For basal cell carcinoma and squamous cell carcinoma, check for:    Sores, shiny bumps, nodules, scaly lesions, or wart-like growths that are itchy, tender, crusting, scabbing, eroding, oozing or bleeding.    Open sores/wounds or reddish/irritated areas that do not heal within 2-3 weeks.    Scar-like areas that are white, yellow or waxy in color.    Also, check your lymph nodes for tenderness or swelling every month. Check front of neck, back of neck, over the collarbone, and in the armpits. (Note that if you have a cold or other illness, they may be swollen)        TT: 20 minutes.  CT: 15 minutes.        Again, thank you for allowing me to participate in the care of your patient.        Sincerely,        Garima Sultana MD

## 2019-09-23 ENCOUNTER — TELEPHONE (OUTPATIENT)
Dept: FAMILY MEDICINE | Facility: CLINIC | Age: 64
End: 2019-09-23

## 2019-09-23 LAB — COPATH REPORT: NORMAL

## 2019-09-23 NOTE — TELEPHONE ENCOUNTER
Called and LM for patient to call back in regards to biopsy results marcos Bravo RN-BSN-PHN  [facility]  [phone]

## 2019-09-23 NOTE — LETTER
[facility]  [address]  [address]  [phone]    9/24/2019       Jacqueline Jean Baptistehane  [address]  [place] [address]      Dear Jacqueline:    You are scheduled for Mohs Surgery on: 12/12/19 @7:00am.    Please check in at [facility], [address].     You don't need to arrive more than 5-10 minutes prior to your appointment time.     Be sure to eat a good breakfast and bathe and wash your hair prior to surgery.     If you are taking any anti-coagulants that are prescribed by your Doctor (such as Coumadin/Warfarin, Plavix, Aspirin, Ibuprofen), please continue taking them.     However, if you are taking anti-coagulants over the counter without a Doctor's order for a medical condition, please discontinue them 10 days prior to surgery.     Please wear loose comfortable clothing as it could possibly be 4-6 hours until your surgery is completed depending upon how many layers of tissue need to be removed.      Thank you,    STEPHANY Nava MD

## 2019-09-24 ENCOUNTER — TELEPHONE (OUTPATIENT)
Dept: FAMILY MEDICINE | Facility: CLINIC | Age: 64
End: 2019-09-24

## 2019-09-24 NOTE — TELEPHONE ENCOUNTER
Called and spoke to patient. Educated patient on biopsy results- BCC x2. Educated patient on BCC, mohs, scheduled mohs x2 (Dr. Nava forehead/Dr. Sultana chest, and letter/packet sent. Patient voiced understanding.    Shelly RN-BSN-PHN  [facility]  [phone]

## 2019-09-24 NOTE — TELEPHONE ENCOUNTER
Pt calling back. Informed her of results. And then transferred to scheduling to help schedule procedure.

## 2019-09-24 NOTE — TELEPHONE ENCOUNTER
This is a duplicate encounter. Please see other telephone encounter.    EDIS Bravo-BSN-PHN  [facility]  [phone]

## 2019-09-24 NOTE — TELEPHONE ENCOUNTER
Reason for Call:  Other call back    Detailed comments: Patient would like to schedule a MOHS surgery with Garima Sultana in [place]    Phone Number Patient can be reached at: Cell number on file:    Telephone Information:   Mobile [phone]       Best Time: anytime    Can we leave a detailed message on this number? YES    Call taken on 9/24/2019 at 10:22 AM by Temo Vu

## 2019-09-28 ENCOUNTER — HEALTH MAINTENANCE LETTER (OUTPATIENT)
Age: 64
End: 2019-09-28

## 2019-10-01 ENCOUNTER — OFFICE VISIT (OUTPATIENT)
Dept: FAMILY MEDICINE | Facility: CLINIC | Age: 64
End: 2019-10-01
Payer: COMMERCIAL

## 2019-10-01 ENCOUNTER — ANCILLARY PROCEDURE (OUTPATIENT)
Dept: GENERAL RADIOLOGY | Facility: CLINIC | Age: 64
End: 2019-10-01
Attending: FAMILY MEDICINE
Payer: COMMERCIAL

## 2019-10-01 VITALS
WEIGHT: 168 LBS | DIASTOLIC BLOOD PRESSURE: 90 MMHG | HEIGHT: 64 IN | HEART RATE: 85 BPM | BODY MASS INDEX: 28.68 KG/M2 | RESPIRATION RATE: 18 BRPM | TEMPERATURE: 97.6 F | SYSTOLIC BLOOD PRESSURE: 148 MMHG | OXYGEN SATURATION: 97 %

## 2019-10-01 DIAGNOSIS — R03.0 ELEVATED BLOOD PRESSURE READING WITHOUT DIAGNOSIS OF HYPERTENSION: ICD-10-CM

## 2019-10-01 DIAGNOSIS — E03.9 ACQUIRED HYPOTHYROIDISM: ICD-10-CM

## 2019-10-01 DIAGNOSIS — R10.11 RUQ ABDOMINAL PAIN: ICD-10-CM

## 2019-10-01 DIAGNOSIS — R07.89 CHEST WALL PAIN: ICD-10-CM

## 2019-10-01 DIAGNOSIS — E78.5 HYPERLIPIDEMIA WITH TARGET LDL LESS THAN 130: ICD-10-CM

## 2019-10-01 DIAGNOSIS — R07.89 CHEST WALL PAIN: Primary | ICD-10-CM

## 2019-10-01 LAB
ALBUMIN SERPL-MCNC: 4.4 G/DL (ref 3.4–5)
ALP SERPL-CCNC: 89 U/L (ref 40–150)
ALT SERPL W P-5'-P-CCNC: 27 U/L (ref 0–50)
ANION GAP SERPL CALCULATED.3IONS-SCNC: 3 MMOL/L (ref 3–14)
AST SERPL W P-5'-P-CCNC: 19 U/L (ref 0–45)
BASOPHILS # BLD AUTO: 0 10E9/L (ref 0–0.2)
BASOPHILS NFR BLD AUTO: 0 %
BILIRUB SERPL-MCNC: 0.6 MG/DL (ref 0.2–1.3)
BUN SERPL-MCNC: 15 MG/DL (ref 7–30)
CALCIUM SERPL-MCNC: 9.6 MG/DL (ref 8.5–10.1)
CHLORIDE SERPL-SCNC: 110 MMOL/L (ref 94–109)
CO2 SERPL-SCNC: 27 MMOL/L (ref 20–32)
CREAT SERPL-MCNC: 0.88 MG/DL (ref 0.52–1.04)
DIFFERENTIAL METHOD BLD: NORMAL
EOSINOPHIL # BLD AUTO: 0 10E9/L (ref 0–0.7)
EOSINOPHIL NFR BLD AUTO: 0.1 %
ERYTHROCYTE [DISTWIDTH] IN BLOOD BY AUTOMATED COUNT: 13.5 % (ref 10–15)
GFR SERPL CREATININE-BSD FRML MDRD: 69 ML/MIN/{1.73_M2}
GLUCOSE SERPL-MCNC: 83 MG/DL (ref 70–99)
HCT VFR BLD AUTO: 42.9 % (ref 35–47)
HGB BLD-MCNC: 14.5 G/DL (ref 11.7–15.7)
LYMPHOCYTES # BLD AUTO: 1.7 10E9/L (ref 0.8–5.3)
LYMPHOCYTES NFR BLD AUTO: 25.2 %
MCH RBC QN AUTO: 32.4 PG (ref 26.5–33)
MCHC RBC AUTO-ENTMCNC: 33.8 G/DL (ref 31.5–36.5)
MCV RBC AUTO: 96 FL (ref 78–100)
MONOCYTES # BLD AUTO: 0.6 10E9/L (ref 0–1.3)
MONOCYTES NFR BLD AUTO: 8.9 %
NEUTROPHILS # BLD AUTO: 4.4 10E9/L (ref 1.6–8.3)
NEUTROPHILS NFR BLD AUTO: 65.8 %
PLATELET # BLD AUTO: 194 10E9/L (ref 150–450)
POTASSIUM SERPL-SCNC: 4.2 MMOL/L (ref 3.4–5.3)
PROT SERPL-MCNC: 7.2 G/DL (ref 6.8–8.8)
RBC # BLD AUTO: 4.48 10E12/L (ref 3.8–5.2)
SODIUM SERPL-SCNC: 140 MMOL/L (ref 133–144)
TSH SERPL DL<=0.005 MIU/L-ACNC: 2.15 MU/L (ref 0.4–4)
WBC # BLD AUTO: 6.7 10E9/L (ref 4–11)

## 2019-10-01 PROCEDURE — 80053 COMPREHEN METABOLIC PANEL: CPT | Performed by: FAMILY MEDICINE

## 2019-10-01 PROCEDURE — 99214 OFFICE O/P EST MOD 30 MIN: CPT | Performed by: FAMILY MEDICINE

## 2019-10-01 PROCEDURE — 71046 X-RAY EXAM CHEST 2 VIEWS: CPT

## 2019-10-01 PROCEDURE — 84443 ASSAY THYROID STIM HORMONE: CPT | Performed by: FAMILY MEDICINE

## 2019-10-01 PROCEDURE — 36415 COLL VENOUS BLD VENIPUNCTURE: CPT | Performed by: FAMILY MEDICINE

## 2019-10-01 PROCEDURE — 85025 COMPLETE CBC W/AUTO DIFF WBC: CPT | Performed by: FAMILY MEDICINE

## 2019-10-01 ASSESSMENT — MIFFLIN-ST. JEOR: SCORE: 1297.04

## 2019-10-01 NOTE — RESULT ENCOUNTER NOTE
Your results are normal.  Your final test results are pending.  Please check your chart again within 2 - 3 days. You will receive further instruction when your full test result panel is complete. Venessa Carter MD

## 2019-10-01 NOTE — PROGRESS NOTES
"Subjective     Jacqueline Banegas is a 64 year old female who presents to clinic today for the following health issues:    HPI   Chief Complaint   Patient presents with     Rib Injury     \"popping sound\" on right side     Jacqueline Banegas is a 64 year old female who presents with RUQ/chest wall pain since an injury while leaning on her grandchild's feet, resulting in a popping sensation sound. Symptom onset has been sudden, unchanged for a time period of 6 weeks. Severity is described as mild and constant. Course of her symptoms over time is unchanged and not associated with fever, chills, cough, cold symptoms, post prandial symptoms, nausea, vomiting, change in bowel movements or pleuritic features. No risk for PE.     Hypercholesterolemia well controlled with current treatment plan without side effects. Patient also presents for follow-up of hypothyroidism, controlled on current medication.  Denies symptoms of hypo- or hyperthyroidism.     Reviewed and updated as needed this visit by Provider  Tobacco  Allergies  Meds  Problems  Med Hx  Surg Hx  Fam Hx         Review of Systems   ROS COMP: CONSTITUTIONAL: NEGATIVE for fever, chills, change in weight  INTEGUMENTARY/SKIN: NEGATIVE for worrisome rashes, moles or lesions  ENT/MOUTH: NEGATIVE for ear, mouth and throat problems  RESP: NEGATIVE for significant cough or SOB  CV: see HPI   GI: as above   : NEGATIVE for frequency, dysuria, or hematuria  MUSCULOSKELETAL: NEGATIVE for significant arthralgias or myalgia  NEURO: NEGATIVE for weakness, dizziness or paresthesias      Objective    BP (!) 148/90   Pulse 85   Temp 97.6  F (36.4  C) (Oral)   Resp 18   Ht 1.626 m (5' 4\")   Wt 76.2 kg (168 lb)   SpO2 97%   Breastfeeding? No   BMI 28.84 kg/m    Body mass index is 28.84 kg/m .  Physical Exam   GENERAL: healthy, alert and no distress  EYES: Eyes grossly normal to inspection, PERRL and conjunctivae and sclerae normal  HENT: ear canals and TM's normal, nose and " mouth without ulcers or lesions  NECK: no adenopathy, no asymmetry, masses, or scars and thyroid normal to palpation  RESP: lungs clear to auscultation - no rales, rhonchi or wheezes  CV: regular rate and rhythm, normal S1 S2, no S3 or S4, no murmur, click or rub, no peripheral edema    ABDOMEN: soft, nontender, no hepatosplenomegaly, no masses and bowel sounds normal  MS: no gross musculoskeletal defects noted, no edema  SKIN: no suspicious lesions or rashes  PSYCH: mentation appears normal, affect normal/bright    Diagnostic Test Results:  Labs reviewed in Epic  Results for orders placed or performed in visit on 10/01/19   CBC with platelets differential   Result Value Ref Range    WBC 6.7 4.0 - 11.0 10e9/L    RBC Count 4.48 3.8 - 5.2 10e12/L    Hemoglobin 14.5 11.7 - 15.7 g/dL    Hematocrit 42.9 35.0 - 47.0 %    MCV 96 78 - 100 fl    MCH 32.4 26.5 - 33.0 pg    MCHC 33.8 31.5 - 36.5 g/dL    RDW 13.5 10.0 - 15.0 %    Platelet Count 194 150 - 450 10e9/L    % Neutrophils 65.8 %    % Lymphocytes 25.2 %    % Monocytes 8.9 %    % Eosinophils 0.1 %    % Basophils 0.0 %    Absolute Neutrophil 4.4 1.6 - 8.3 10e9/L    Absolute Lymphocytes 1.7 0.8 - 5.3 10e9/L    Absolute Monocytes 0.6 0.0 - 1.3 10e9/L    Absolute Eosinophils 0.0 0.0 - 0.7 10e9/L    Absolute Basophils 0.0 0.0 - 0.2 10e9/L    Diff Method Automated Method            Assessment & Plan     (R07.89) Chest wall pain  (primary encounter diagnosis)  (R10.11) RUQ abdominal pain  Comment: Differential diagnoses would include: contusion, strain, post herpetic neuralgia, less likely would be biliary colic, gastritis, PUD or mass   Plan: XR Chest 2 Views, Comprehensive metabolic         panel, CBC with platelets differential        Call or return to clinic as needed if these symptoms worsen or fail to improve as anticipated to consider ultrasound     (E78.5) Hyperlipidemia with target LDL less than 130  Comment: Well controlled with medications without side effects.  "  Plan: Lipid panel reflex to direct LDL Fasting,         Comprehensive metabolic panel         (E03.9) Acquired hypothyroidism  Comment: euthyroid on replacement   Plan: TSH with free T4 reflex          (R03.0) Elevated blood pressure reading without diagnosis of hypertension  Plan: Comprehensive metabolic panel             BMI:   Estimated body mass index is 28.84 kg/m  as calculated from the following:    Height as of this encounter: 1.626 m (5' 4\").    Weight as of this encounter: 76.2 kg (168 lb).   Weight management plan: Discussed healthy diet and exercise guidelines            Return in about 6 weeks (around 11/12/2019) for physical (fasting labs up to one week prior), blood pressure.    Venessa Carter MD  [facility]    "

## 2019-10-15 NOTE — PROGRESS NOTES
[facility] - PRIMARY CARE SKIN    CC: wide excision for basal cell carcinoma   SUBJECTIVE:   Jacqueline Banegas is a(n) 64 year old female who presents to clinic today for follow-up wide excision of basal cell carcinoma ,inflitrating type on the left lateral chest wall.    Tissue Pathology Report from 09/18/2019  A: Shave, midline upper forehead   B: Shave, right lateral chest wall     FINAL DIAGNOSIS:   A. Shave, midline upper forehead:   - Basal cell carcinoma, superficial and nodular types, extending to the   lateral and deep margins - (see   description)     B. Shave, right lateral chest wall:   - Basal cell carcinoma, infiltrating type, extending to the lateral and   deep margins - (see description)     I have personally reviewed all specimens and/or slides, including the   listed special stains, and used them   with my medical judgement to determine or confirm the final diagnosis.     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 ? Skin cancers on leg  6/18/2018 - Superficial basal cell carcinoma on left mid-arm extending to deep and lateral margins  6/18/2018 - Superficial basal cell carcinoma on left lateral mid-upper arm, narrowly excised  6/18/2018 - Nodular basal cell carcinoma on right upper chest, extending to deep margin  6/18/2018 - Superficial basal cell carcinoma on chest midline at T3, narrowly excised  6/18/2018 - Squamous cell carcinoma in situ on left upper chest, narrowly excised     Family history of skin cancer : YES - non-melanoma skin cancer in father and sister.  Autoimmune : NO     Ancestry : Luxem[place]ish, English, Surinamese, Northern      Sun Exposure History  Previous history  "of significant sun exposure:  Blistering sunburns : YES  Tanning beds : YES - when younger.  Sunscreen Use : YES, SPF : 50.  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     Refer to electronic medical record (EMR) for past medical history and medications.     INTEGUMENTARY/SKIN: POSITIVE for changing lesion  ROS: 14 point review of systems was negative except the symptoms listed above in the HPI.    OBJECTIVE:   GENERAL: healthy, alert and no distress.  SKIN: Choudhary Skin Type - I.  Trunk examined. The dermatoscope was used to help evaluate pigmented lesions.  Skin Pertinent Findings:  Right lateral chest wall- 12mm X 10 mm well healed shave excision site - basal cell carcinoma    ASSESSMENT:     Encounter Diagnosis   Name Primary?     Basal cell carcinoma of chest wall Yes         PLAN:   Patient Instructions   FUTURE APPOINTMENTS  Follow up in 7 days for bandage change with the nurse.  Follow up in 3 months to recheck the excision site.   Dr. Sultana cell phone: [phone]    BANDAGE CARE INSTRUCTIONS    Keep dressing completely dry.    Make sure to avoid soaking the procedure area.    Limit use of the area where the procedure was done for a few days to allow for optimal healing.    If you experience bleeding:  Wash hands and hold firm pressure on the area for 10 minutes without checking to see if the bleeding has stopped. \"Checking\" pulls off the protective wound clot and restarts the bleeding all over again. Re-apply pressure for 10 minutes if necessary to stop bleeding.  Use additional sterile gauze and tape to maintain pressure once bleeding has stopped.  If bleeding continues, then call back to clinic at [phone].    Signs of Infection:  Infection can occur in any area where skin has been disrupted.  If you notice persistent redness, swelling, colored drainage, increasing pain, " Blunt fever or other signs of infection, please call us at: [phone] and ask to have me or my colleague paged. We will call you back to discuss.    Pathology Results:    You will be notified, generally via letter or MyChart, in approximately 10 days. If there is anything we need to discuss or further treatment needed, I will call you to discuss it.        SKIN CANCER SELF-EXAM INSTRUCTIONS  Check every month in the mirror or with a household member. Be aware of any changes, especially bleeding or tenderness. Also, make sure to check your nails for color changes after removal of nail polish.    For melanoma, check for:  A - Asymmetry. One half unlike the other half.  B - Border. Irregular, scalloped, ragged, notched, blurred or poorly defined borders.  C - Color. Color variations from one area to another, with shades of tan, brown and/or black present. Sometimes white, red or blue.  D - Diameter. Greater than 6 mm (about the size of a pencil eraser). Any new growth of a mole should be concerning and be evaluated.  E - Evolving. A mole or skin lesion that looks different from the rest or is changing in size, shape or color.    For basal cell carcinoma and squamous cell carcinoma, check for:    Sores, shiny bumps, nodules, scaly lesions, or wart-like growths that are itchy, tender, crusting, scabbing, eroding, oozing or bleeding.    Open sores/wounds or reddish/irritated areas that do not heal within 2-3 weeks.    Scar-like areas that are white, yellow or waxy in color.    Also, check your lymph nodes for tenderness or swelling every month. Check front of neck, back of neck, over the collarbone, and in the armpits. (Note that if you have a cold or other illness, they may be swollen)            Signs of infection (spreading erythema, increasing pain, purulent discharge) were discussed. Limit physical activity for the next several days involving this area. No swimming, keep lesions clean and dry except for showering  until the sutures are removed or absorbed. The patient will be notified of the pathology results at the next office visit or via MyChart or phone. The patient was given written discharge instructions and was discharged in stable condition.    TT: 40 minutes.  CT: 10 minutes.    The information in this document, created by the medical scribe for me, accurately reflects the services I personally performed and the decisions made by me. I have reviewed and approved this document for accuracy prior to leaving the patient care

## 2019-10-16 ENCOUNTER — OFFICE VISIT (OUTPATIENT)
Dept: FAMILY MEDICINE | Facility: CLINIC | Age: 64
End: 2019-10-16
Payer: COMMERCIAL

## 2019-10-16 VITALS — SYSTOLIC BLOOD PRESSURE: 122 MMHG | DIASTOLIC BLOOD PRESSURE: 78 MMHG

## 2019-10-16 DIAGNOSIS — C44.519 BASAL CELL CARCINOMA OF CHEST WALL: Primary | ICD-10-CM

## 2019-10-16 PROCEDURE — 88305 TISSUE EXAM BY PATHOLOGIST: CPT | Mod: TC | Performed by: FAMILY MEDICINE

## 2019-10-16 PROCEDURE — 11602 EXC TR-EXT MAL+MARG 1.1-2 CM: CPT | Performed by: FAMILY MEDICINE

## 2019-10-16 NOTE — LETTER
10/16/2019         RE: Jacqueline Banegas  [address]  [place] [address]        Dear Colleague,    Thank you for referring your patient, Jacqueline Banegas, to the [facility]. Please see a copy of my visit note below.    [facility] - PRIMARY CARE SKIN    CC: wide excision for basal cell carcinoma   SUBJECTIVE:   Jacqueline Banegas is a(n) 64 year old female who presents to clinic today for follow-up wide excision of basal cell carcinoma ,inflitrating type on the left lateral chest wall.    Tissue Pathology Report from 09/18/2019  A: Shave, midline upper forehead   B: Shave, right lateral chest wall     FINAL DIAGNOSIS:   A. Shave, midline upper forehead:   - Basal cell carcinoma, superficial and nodular types, extending to the   lateral and deep margins - (see   description)     B. Shave, right lateral chest wall:   - Basal cell carcinoma, infiltrating type, extending to the lateral and   deep margins - (see description)     I have personally reviewed all specimens and/or slides, including the   listed special stains, and used them   with my medical judgement to determine or confirm the final diagnosis.     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 ? Skin cancers on leg  6/18/2018 - Superficial basal cell carcinoma on left mid-arm extending to deep and lateral margins  6/18/2018 - Superficial basal cell carcinoma on left lateral mid-upper arm, narrowly excised  6/18/2018 - Nodular basal cell carcinoma on right upper chest, extending to deep margin  6/18/2018 - Superficial basal cell carcinoma on chest midline at T3, narrowly excised  6/18/2018 - Squamous cell carcinoma in situ  "on left upper chest, narrowly excised     Family history of skin cancer : YES - non-melanoma skin cancer in father and sister.  Autoimmune : NO     Ancestry : Sudanese, English, Icelandic, Northern      Sun Exposure History  Previous history of significant sun exposure:  Blistering sunburns : YES  Tanning beds : YES - when younger.  Sunscreen Use : YES, SPF : 50.  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     Refer to electronic medical record (EMR) for past medical history and medications.     INTEGUMENTARY/SKIN: POSITIVE for changing lesion  ROS: 14 point review of systems was negative except the symptoms listed above in the HPI.    OBJECTIVE:   GENERAL: healthy, alert and no distress.  SKIN: Choudhary Skin Type - I.  Trunk examined. The dermatoscope was used to help evaluate pigmented lesions.  Skin Pertinent Findings:  Right lateral chest wall- 12mm X 10 mm well healed shave excision site - basal cell carcinoma    ASSESSMENT:     Encounter Diagnosis   Name Primary?     Basal cell carcinoma of chest wall Yes         PLAN:   Patient Instructions   FUTURE APPOINTMENTS  Follow up in 7 days for bandage change with the nurse.  Follow up in 3 months to recheck the excision site.   Dr. Sultana cell phone: [phone]    BANDAGE CARE INSTRUCTIONS    Keep dressing completely dry.    Make sure to avoid soaking the procedure area.    Limit use of the area where the procedure was done for a few days to allow for optimal healing.    If you experience bleeding:  Wash hands and hold firm pressure on the area for 10 minutes without checking to see if the bleeding has stopped. \"Checking\" pulls off the protective wound clot and restarts the bleeding all over again. Re-apply pressure for 10 minutes if necessary to stop bleeding.  Use additional sterile gauze and tape to maintain pressure once bleeding has " stopped.  If bleeding continues, then call back to clinic at [phone].    Signs of Infection:  Infection can occur in any area where skin has been disrupted.  If you notice persistent redness, swelling, colored drainage, increasing pain, fever or other signs of infection, please call us at: [phone] and ask to have me or my colleague paged. We will call you back to discuss.    Pathology Results:    You will be notified, generally via letter or MyChart, in approximately 10 days. If there is anything we need to discuss or further treatment needed, I will call you to discuss it.        SKIN CANCER SELF-EXAM INSTRUCTIONS  Check every month in the mirror or with a household member. Be aware of any changes, especially bleeding or tenderness. Also, make sure to check your nails for color changes after removal of nail polish.    For melanoma, check for:  A - Asymmetry. One half unlike the other half.  B - Border. Irregular, scalloped, ragged, notched, blurred or poorly defined borders.  C - Color. Color variations from one area to another, with shades of tan, brown and/or black present. Sometimes white, red or blue.  D - Diameter. Greater than 6 mm (about the size of a pencil eraser). Any new growth of a mole should be concerning and be evaluated.  E - Evolving. A mole or skin lesion that looks different from the rest or is changing in size, shape or color.    For basal cell carcinoma and squamous cell carcinoma, check for:    Sores, shiny bumps, nodules, scaly lesions, or wart-like growths that are itchy, tender, crusting, scabbing, eroding, oozing or bleeding.    Open sores/wounds or reddish/irritated areas that do not heal within 2-3 weeks.    Scar-like areas that are white, yellow or waxy in color.    Also, check your lymph nodes for tenderness or swelling every month. Check front of neck, back of neck, over the collarbone, and in the armpits. (Note that if you have a cold or other illness, they may be  swollen)            Signs of infection (spreading erythema, increasing pain, purulent discharge) were discussed. Limit physical activity for the next several days involving this area. No swimming, keep lesions clean and dry except for showering until the sutures are removed or absorbed. The patient will be notified of the pathology results at the next office visit or via MyChart or phone. The patient was given written discharge instructions and was discharged in stable condition.    TT: 40 minutes.  CT: 10 minutes.    The information in this document, created by the medical scribe for me, accurately reflects the services I personally performed and the decisions made by me. I have reviewed and approved this document for accuracy prior to leaving the patient care       Again, thank you for allowing me to participate in the care of your patient.        Sincerely,        Garima Sultana MD

## 2019-10-16 NOTE — PROCEDURES
Name: Wide Excision  Indication: Wide excision of tissue for pathology evaluation of Basal Cell Carcinoma.  Location(s): Right lateral chest wall- 12mm X 10 mm well healed shave excision site  Completed by: Katlyn Sultana MD.  Photo Taken: NO.  Anesthesia: Patient was anesthetized by infiltrating the area surrounding the lesion with 1% lidocaine and epinephrine [id number].  Note: Prior to procedure we discussed expectations for healing, risk of infection, and scar formation. Discussed other treatment options available. Discussed the risk of pain, infection, scarring, hypo- or hyperpigmentation and recurrence or need for re-treatment. The benefits of treatment and alternative treatments were also discussed.    During this procedure, the universal protocol was utilized. The patient's identity was confirmed by no less than two patient identifiers, correct procedure was verified, correct site was verified and marked as applicable and a final pause was completed.    Sterile technique was used throughout the procedure. The skin was cleaned and prepped with Chloroprep. A 4 mm margin was marked out on each side of the lesion. Sterile drapes were laid out. Once adequate anesthesia was obtained, an elliptical incision was made with a # 10 blade through the epidermis and dermis. The tissue specimen was placed in formalin and sent to pathology.    Direct pressure and monopolar cautery was applied for hemostasis.  Defect was approximated with 3-0 Vicryl.  Edges were approximated with 6-0 fast absorbing plain gut interrupted simple stitch.  Minimal bleeding occurred. Dressing was applied and patient left in satisfactory condition.    Widest diameter: 18 mm.  Final length of defect: 6 cm.

## 2019-10-16 NOTE — PATIENT INSTRUCTIONS
"FUTURE APPOINTMENTS  Follow up in 7 days for bandage change with the nurse.  Follow up in 3 months to recheck the excision site.   Dr. Sultana cell phone: [phone]    BANDAGE CARE INSTRUCTIONS    Keep dressing completely dry.    Make sure to avoid soaking the procedure area.    Limit use of the area where the procedure was done for a few days to allow for optimal healing.    If you experience bleeding:  Wash hands and hold firm pressure on the area for 10 minutes without checking to see if the bleeding has stopped. \"Checking\" pulls off the protective wound clot and restarts the bleeding all over again. Re-apply pressure for 10 minutes if necessary to stop bleeding.  Use additional sterile gauze and tape to maintain pressure once bleeding has stopped.  If bleeding continues, then call back to clinic at [phone].    Signs of Infection:  Infection can occur in any area where skin has been disrupted.  If you notice persistent redness, swelling, colored drainage, increasing pain, fever or other signs of infection, please call us at: [phone] and ask to have me or my colleague paged. We will call you back to discuss.    Pathology Results:    You will be notified, generally via letter or MyChart, in approximately 10 days. If there is anything we need to discuss or further treatment needed, I will call you to discuss it.        SKIN CANCER SELF-EXAM INSTRUCTIONS  Check every month in the mirror or with a household member. Be aware of any changes, especially bleeding or tenderness. Also, make sure to check your nails for color changes after removal of nail polish.    For melanoma, check for:  A - Asymmetry. One half unlike the other half.  B - Border. Irregular, scalloped, ragged, notched, blurred or poorly defined borders.  C - Color. Color variations from one area to another, with shades of tan, brown and/or black present. Sometimes white, red or blue.  D - Diameter. Greater than 6 mm (about the size of a " pencil eraser). Any new growth of a mole should be concerning and be evaluated.  E - Evolving. A mole or skin lesion that looks different from the rest or is changing in size, shape or color.    For basal cell carcinoma and squamous cell carcinoma, check for:    Sores, shiny bumps, nodules, scaly lesions, or wart-like growths that are itchy, tender, crusting, scabbing, eroding, oozing or bleeding.    Open sores/wounds or reddish/irritated areas that do not heal within 2-3 weeks.    Scar-like areas that are white, yellow or waxy in color.    Also, check your lymph nodes for tenderness or swelling every month. Check front of neck, back of neck, over the collarbone, and in the armpits. (Note that if you have a cold or other illness, they may be swollen)

## 2019-10-21 ENCOUNTER — TELEPHONE (OUTPATIENT)
Dept: FAMILY MEDICINE | Facility: CLINIC | Age: 64
End: 2019-10-21

## 2019-10-21 LAB — COPATH REPORT: NORMAL

## 2019-10-21 NOTE — TELEPHONE ENCOUNTER
Left message on answering machine for patient to call back.    Almaz GALERN BSN  [facility]  [phone]

## 2019-10-21 NOTE — TELEPHONE ENCOUNTER
Patient notified of test results and providers message, patient has no further questions.    Almaz GALERN BSN  [facility]  [phone]

## 2019-10-23 ENCOUNTER — ALLIED HEALTH/NURSE VISIT (OUTPATIENT)
Dept: NURSING | Facility: CLINIC | Age: 64
End: 2019-10-23
Payer: COMMERCIAL

## 2019-10-23 DIAGNOSIS — Z48.01 ENCOUNTER FOR CHANGE OR REMOVAL OF SURGICAL WOUND DRESSING: Primary | ICD-10-CM

## 2019-10-23 PROCEDURE — 99207 ZZC NO CHARGE NURSE ONLY: CPT

## 2019-10-23 NOTE — NURSING NOTE
Patient returned to clinic for post surgery 1 week follow up bandage change. Patient has no complaints, denies pain.  Bandage removed from med back. Area cleansed with wound cleanser. Site is healing with no signs of infection, wound edges approximating well.   Reapplied new steri strips and paper tape.     Advised to watch for signs and symptons of infection; spreading redness, increasing pain, drainage, odor, fever.   Call or report promptly to clinic if any of these symptoms are present.    Wound care post op instructions given and discussed for 14 day bandage removal and continued incision/scar care.    Patient verbalized understanding.     Almaz GALE RN  [facility]  [phone]

## 2019-10-27 ENCOUNTER — HEALTH MAINTENANCE LETTER (OUTPATIENT)
Age: 64
End: 2019-10-27

## 2019-11-26 DIAGNOSIS — E03.9 ACQUIRED HYPOTHYROIDISM: ICD-10-CM

## 2019-11-27 RX ORDER — LEVOTHYROXINE SODIUM 112 MCG
TABLET ORAL
Qty: 90 TABLET | Refills: 1 | Status: SHIPPED | OUTPATIENT
Start: 2019-11-27 | End: 2019-12-11

## 2019-11-27 NOTE — TELEPHONE ENCOUNTER
Prescription approved per [facility] Protocol.  Sharmila Bahena RN

## 2019-12-04 NOTE — PROGRESS NOTES
Surgical Office Location:  [facility]  [address] [place] [id number]

## 2019-12-08 ENCOUNTER — HEALTH MAINTENANCE LETTER (OUTPATIENT)
Age: 64
End: 2019-12-08

## 2019-12-11 ENCOUNTER — OFFICE VISIT (OUTPATIENT)
Dept: FAMILY MEDICINE | Facility: CLINIC | Age: 64
End: 2019-12-11
Payer: COMMERCIAL

## 2019-12-11 VITALS
WEIGHT: 169 LBS | HEIGHT: 64 IN | HEART RATE: 86 BPM | OXYGEN SATURATION: 96 % | TEMPERATURE: 96.5 F | SYSTOLIC BLOOD PRESSURE: 128 MMHG | RESPIRATION RATE: 12 BRPM | DIASTOLIC BLOOD PRESSURE: 72 MMHG | BODY MASS INDEX: 28.85 KG/M2

## 2019-12-11 DIAGNOSIS — G43.909 MIGRAINE WITHOUT STATUS MIGRAINOSUS, NOT INTRACTABLE, UNSPECIFIED MIGRAINE TYPE: ICD-10-CM

## 2019-12-11 DIAGNOSIS — Z00.00 ROUTINE GENERAL MEDICAL EXAMINATION AT A HEALTH CARE FACILITY: Primary | ICD-10-CM

## 2019-12-11 DIAGNOSIS — E78.5 HYPERLIPIDEMIA WITH TARGET LDL LESS THAN 130: ICD-10-CM

## 2019-12-11 DIAGNOSIS — I51.7 VENTRICULAR HYPERTROPHY: ICD-10-CM

## 2019-12-11 DIAGNOSIS — E03.9 ACQUIRED HYPOTHYROIDISM: ICD-10-CM

## 2019-12-11 DIAGNOSIS — Z82.49 FAMILY HISTORY OF IDIOPATHIC HYPERTROPHIC SUBAORTIC STENOSIS: ICD-10-CM

## 2019-12-11 DIAGNOSIS — Z12.31 VISIT FOR SCREENING MAMMOGRAM: ICD-10-CM

## 2019-12-11 DIAGNOSIS — Q21.12 PFO (PATENT FORAMEN OVALE): ICD-10-CM

## 2019-12-11 LAB
CHOLEST SERPL-MCNC: 147 MG/DL
GLUCOSE SERPL-MCNC: 96 MG/DL (ref 70–99)
HDLC SERPL-MCNC: 64 MG/DL
LDLC SERPL CALC-MCNC: 69 MG/DL
NONHDLC SERPL-MCNC: 83 MG/DL
TRIGL SERPL-MCNC: 68 MG/DL

## 2019-12-11 PROCEDURE — 99213 OFFICE O/P EST LOW 20 MIN: CPT | Mod: 25 | Performed by: FAMILY MEDICINE

## 2019-12-11 PROCEDURE — 36415 COLL VENOUS BLD VENIPUNCTURE: CPT | Performed by: FAMILY MEDICINE

## 2019-12-11 PROCEDURE — 82947 ASSAY GLUCOSE BLOOD QUANT: CPT | Performed by: FAMILY MEDICINE

## 2019-12-11 PROCEDURE — 80061 LIPID PANEL: CPT | Performed by: FAMILY MEDICINE

## 2019-12-11 PROCEDURE — 99396 PREV VISIT EST AGE 40-64: CPT | Performed by: FAMILY MEDICINE

## 2019-12-11 RX ORDER — LEVOTHYROXINE SODIUM 112 MCG
112 TABLET ORAL DAILY
Qty: 90 TABLET | Refills: 3 | Status: SHIPPED | OUTPATIENT
Start: 2019-12-11 | End: 2020-10-27

## 2019-12-11 RX ORDER — SUMATRIPTAN 50 MG/1
50-100 TABLET, FILM COATED ORAL
Qty: 9 TABLET | Refills: 11 | Status: SHIPPED | OUTPATIENT
Start: 2019-12-11

## 2019-12-11 RX ORDER — SIMVASTATIN 40 MG
TABLET ORAL
Qty: 90 TABLET | Refills: 3 | Status: SHIPPED | OUTPATIENT
Start: 2019-12-11 | End: 2020-10-27

## 2019-12-11 ASSESSMENT — ENCOUNTER SYMPTOMS
PARESTHESIAS: 0
ARTHRALGIAS: 0
MYALGIAS: 0
ABDOMINAL PAIN: 0
CONSTIPATION: 0
WEAKNESS: 0
BREAST MASS: 0
CHILLS: 0
JOINT SWELLING: 0
HEADACHES: 0
DIARRHEA: 0
HEMATOCHEZIA: 0
PALPITATIONS: 0
SHORTNESS OF BREATH: 0
FEVER: 0
COUGH: 0
HEMATURIA: 0
DYSURIA: 0
SORE THROAT: 0
FREQUENCY: 0
HEARTBURN: 0
DIZZINESS: 0
NAUSEA: 0
EYE PAIN: 0
NERVOUS/ANXIOUS: 0

## 2019-12-11 ASSESSMENT — MIFFLIN-ST. JEOR: SCORE: 1301.58

## 2019-12-11 NOTE — PATIENT INSTRUCTIONS
Preventive Health Recommendations  Female Ages 50 - 64    Yearly exam: See your health care provider every year in order to  o Review health changes.   o Discuss preventive care.    o Review your medicines if your doctor has prescribed any.      Get a Pap test every three years (unless you have an abnormal result and your provider advises testing more often).    If you get Pap tests with HPV test, you only need to test every 5 years, unless you have an abnormal result.     You do not need a Pap test if your uterus was removed (hysterectomy) and you have not had cancer.    You should be tested each year for STDs (sexually transmitted diseases) if you're at risk.     Have a mammogram every 1 to 2 years.    Have a colonoscopy at age 50, or have a yearly FIT test (stool test). These exams screen for colon cancer.      Have a cholesterol test every 5 years, or more often if advised.    Have a diabetes test (fasting glucose) every three years. If you are at risk for diabetes, you should have this test more often.     If you are at risk for osteoporosis (brittle bone disease), think about having a bone density scan (DEXA).    Shots: Get a flu shot each year. Get a tetanus shot every 10 years.    Nutrition:     Eat at least 5 servings of fruits and vegetables each day.    Eat whole-grain bread, whole-wheat pasta and brown rice instead of white grains and rice.    Get adequate Calcium and Vitamin D.     Lifestyle    Exercise at least 150 minutes a week (30 minutes a day, 5 days a week). This will help you control your weight and prevent disease.    Limit alcohol to one drink per day.    No smoking.     Wear sunscreen to prevent skin cancer.     See your dentist every six months for an exam and cleaning.    See your eye doctor every 1 to 2 years.    It is recommended that you get a vaccination for shingles called Shingrix (given as 2 shots, 2 to 6 months apart), even if you have already had the Zostavax vaccine. Discuss  getting the Shingix vaccine from your pharmacist, or schedule an ancillary shot visit here. Some insurances do not cover the cost of these vaccines.

## 2019-12-11 NOTE — PROGRESS NOTES
SUBJECTIVE:   CC: Jacqueline Banegas is an 64 year old woman  who presents for preventive health visit.     Hypercholesterolemia well controlled with current treatment plan without side effects. Migraines are well controlled and infrequent without medication side effects. Patient also presents for follow-up of hypothyroidism, controlled on current medication.  Denies symptoms of hypo- or hyperthyroidism.     She has family Hx of IHSS in her mother, and did not see cardiology as referred after an echocardiogram in .     Healthy Habits:     Getting at least 3 servings of Calcium per day:  Yes    Bi-annual eye exam:  Yes    Dental care twice a year:  Yes    Sleep apnea or symptoms of sleep apnea:  None    Diet:  Regular (no restrictions)    Frequency of exercise:  4-5 days/week    Duration of exercise:  30-45 minutes    Taking medications regularly:  No    Barriers to taking medications:  None    Medication side effects:  None    PHQ-2 Total Score: 0    Additional concerns today:  No              Today's PHQ-2 Score:   PHQ-2 (  [employer]) 2019   Q1: Little interest or pleasure in doing things 0   Q2: Feeling down, depressed or hopeless 0   PHQ-2 Score 0   Q1: Little interest or pleasure in doing things Not at all   Q2: Feeling down, depressed or hopeless Not at all   PHQ-2 Score 0       Abuse: Current or Past(Physical, Sexual or Emotional)- No  Do you feel safe in your environment? Yes        Social History     Tobacco Use     Smoking status: Former Smoker     Packs/day: 0.25     Years: 10.00     Pack years: 2.50     Types: Cigarettes     Last attempt to quit: 1990     Years since quittin.9     Smokeless tobacco: Never Used   Substance Use Topics     Alcohol use: No         Alcohol Use 2019   Prescreen: >3 drinks/day or >7 drinks/week? Not Applicable   Prescreen: >3 drinks/day or >7 drinks/week? -       Reviewed orders with patient.  Reviewed health maintenance and updated orders  accordingly - Yes  Patient Active Problem List   Diagnosis     Hyperlipidemia with target LDL less than 130     Migraines     History of basal cell carcinoma     PFO (patent foramen ovale)     Advanced directives, counseling/discussion     Acquired hypothyroidism     Right knee DJD     History of squamous cell carcinoma in situ of skin     Situational anxiety     Ventricular hypertrophy     Family history of idiopathic hypertrophic subaortic stenosis     Past Surgical History:   Procedure Laterality Date     APPENDECTOMY  1967     BIOPSY      Skin Ca.     C  DELIVERY ONLY  ,,      C LAMINECTOMY,>2 SGMT,CERVICAL       C SHOULDER SURG PROC UNLISTED      RT     COLONOSCOPY  2011    Procedure:COLONOSCOPY; Colonoscopy, screening; Surgeon:AUDREY SPENCER; Location:[facility] OR     COLONOSCOPY WITH CO2 INSUFFLATION N/A 2017    Procedure: COLONOSCOPY WITH CO2 INSUFFLATION;  COLON-SCREENING / BINSFELD;  Surgeon: Trevor Raygoza MD;  Location: [facility] OR       Social History     Tobacco Use     Smoking status: Former Smoker     Packs/day: 0.25     Years: 10.00     Pack years: 2.50     Types: Cigarettes     Last attempt to quit: 1990     Years since quittin.9     Smokeless tobacco: Never Used   Substance Use Topics     Alcohol use: No     Family History   Problem Relation Age of Onset     Cancer - colorectal Mother 65     Heart Disease Mother         IHSS     Hypertension Mother      Cancer Father         skin     Blood Disease Father         pernicious anemia     Hypertension Father      Hyperlipidemia Father      Asthma Daughter      Cancer Paternal Aunt         uterine     Diabetes Other         cousin, type I     Diabetes Other          Current Outpatient Medications   Medication Sig Dispense Refill     ALPRAZolam (XANAX PO) For flying       aspirin 81 MG tablet Take 81 mg by mouth daily Reported on 2017       simvastatin (ZOCOR) 40 MG tablet Take 1 tablet by mouth daily 90  tablet 3     SUMAtriptan (IMITREX) 50 MG tablet Take 1-2 tablets ( mg) by mouth at onset of headache for migraine May repeat in 2 hours if needed: max 200 mg/day 9 tablet 11     SYNTHROID 112 MCG tablet Take 1 tablet (112 mcg) by mouth daily 90 tablet 3     Allergies   Allergen Reactions     Erythromycin Hives     palpitations     Penicillins Rash     palpitations       Mammogram Screening: Patient over age 50, mutual decision to screen reflected in health maintenance.    Pertinent mammograms are reviewed under the imaging tab.  History of abnormal Pap smear: NO - age 30-65 PAP every 5 years with negative HPV co-testing recommended  PAP / HPV Latest Ref Rng & Units 2018 9/3/2013 2010   PAP - NIL NIL NIL   HPV 16 DNA NEG:Negative Negative - -   HPV 18 DNA NEG:Negative Negative - -   OTHER HR HPV NEG:Negative Negative - -     Reviewed and updated as needed this visit by clinical staff  Tobacco  Allergies  Meds  Problems  Med Hx  Surg Hx  Fam Hx         Reviewed and updated as needed this visit by Provider  Tobacco  Allergies  Meds  Problems  Med Hx  Surg Hx  Fam Hx        Past Medical History:   Diagnosis Date     Adenomatous goiter      Allergic rhinitis      Basal cell carcinoma ,     scalp, chest, RT upper arm, back     DDD (degenerative disc disease), cervical      History of blood transfusion          Hyperlipidemia LDL goal < 130      Hypothyroidism      Migraine headaches      PFO (patent foramen ovale) 2010     Right knee DJD      Situational anxiety 9/10/2018     Tubular adenoma 2005     Urticaria         Review of Systems   Constitutional: Negative for chills and fever.   HENT: Negative for congestion, ear pain, hearing loss and sore throat.    Eyes: Negative for pain and visual disturbance.   Respiratory: Negative for cough and shortness of breath.    Cardiovascular: Negative for chest pain, palpitations and peripheral edema.   Gastrointestinal:  "Negative for abdominal pain, constipation, diarrhea, heartburn, hematochezia and nausea.   Breasts:  Negative for tenderness, breast mass and discharge.   Genitourinary: Negative for dysuria, frequency, genital sores, hematuria, pelvic pain, urgency, vaginal bleeding and vaginal discharge.   Musculoskeletal: Negative for arthralgias, joint swelling and myalgias.   Skin: Negative for rash.   Neurological: Negative for dizziness, weakness, headaches and paresthesias.   Psychiatric/Behavioral: Negative for mood changes. The patient is not nervous/anxious.           OBJECTIVE:   /72   Pulse 86   Temp 96.5  F (35.8  C) (Oral)   Resp 12   Ht 1.626 m (5' 4\")   Wt 76.7 kg (169 lb)   SpO2 96%   Breastfeeding No   BMI 29.01 kg/m    Physical Exam  GENERAL: alert and no distress  EYES: Eyes grossly normal to inspection, PERRL and conjunctivae and sclerae normal  HENT: ear canals and TM's normal, nose and mouth without ulcers or lesions  NECK: no adenopathy, no asymmetry, masses, or scars and thyroid normal to palpation  RESP: lungs clear to auscultation - no rales, rhonchi or wheezes  BREAST: normal without masses, tenderness or nipple discharge and no palpable axillary masses or adenopathy  CV: regular rate and rhythm, normal S1 S2, no S3 or S4, no murmur, click or rub, no peripheral edema    ABDOMEN: soft, nontender, no hepatosplenomegaly, no masses and bowel sounds normal  MS: no gross musculoskeletal defects noted, no edema  SKIN: no suspicious lesions or rashes  NEURO: Normal strength and tone, mentation intact and speech normal  PSYCH: mentation appears normal, affect normal/bright    Diagnostic Test Results:  Labs reviewed in Epic  No results found for any visits on 12/11/19.    ASSESSMENT/PLAN:   (Z00.00) Routine general medical examination at a health care facility  (primary encounter diagnosis)    (E78.5) Hyperlipidemia with target LDL less than 130  Comment: Well controlled with medications without side " "effects.   Plan: Lipid panel reflex to direct LDL Fasting,         Glucose, simvastatin (ZOCOR) 40 MG tablet,         OFFICE/OUTPT VISIT,EST,LEVL III          (G43.909) Migraine without status migrainosus, not intractable, unspecified migraine type  Comment: Well controlled with medications without side effects.   Plan: SUMAtriptan (IMITREX) 50 MG tablet,         OFFICE/OUTPT VISIT,EST,LEVL III          (E03.9) Acquired hypothyroidism  Comment: euthyroid on replacement   Plan: SYNTHROID 112 MCG tablet, OFFICE/OUTPT         VISIT,EST,LEVL III          (Z82.49) Family history of idiopathic hypertrophic subaortic stenosis  (I51.7) Ventricular hypertrophy  (Q21.1) PFO (patent foramen ovale)  Comment: echocardiogram in    Plan: CARDIOLOGY EVAL ADULT REFERRAL, OFFICE/OUTPT         VISIT,EST,LEVL III          (Z12.31) Visit for screening mammogram  Plan: MA SCREENING DIGITAL BILAT - Future  (s+30)            COUNSELING:  Reviewed preventive health counseling, as reflected in patient instructions  Special attention given to:        Regular exercise       Healthy diet/nutrition       Osteoporosis Prevention/Bone Health       Colon cancer screening       Consider Hep C screening for patients born between 1945 and        HIV screeninx in teen years, 1x in adult years, and at intervals if high risk       The 10-year ASCVD risk score (Irish MONICA Jr., et al., 2013) is: 4.1%    Values used to calculate the score:      Age: 64 years      Sex: Female      Is Non- : No      Diabetic: No      Tobacco smoker: No      Systolic Blood Pressure: 128 mmHg      Is BP treated: No      HDL Cholesterol: 60 mg/dL      Total Cholesterol: 151 mg/dL    Estimated body mass index is 29.01 kg/m  as calculated from the following:    Height as of this encounter: 1.626 m (5' 4\").    Weight as of this encounter: 76.7 kg (169 lb).    Weight management plan: Discussed healthy diet and exercise guidelines     reports that she " quit smoking about 29 years ago. Her smoking use included cigarettes. She has a 2.50 pack-year smoking history. She has never used smokeless tobacco.      Counseling Resources:  ATP IV Guidelines  Pooled Cohorts Equation Calculator  Breast Cancer Risk Calculator  FRAX Risk Assessment  ICSI Preventive Guidelines  Dietary Guidelines for Americans, 2010  USDA's MyPlate  ASA Prophylaxis  Lung CA Screening    Venessa Carter MD  [facility]

## 2019-12-12 ENCOUNTER — OFFICE VISIT (OUTPATIENT)
Dept: DERMATOLOGY | Facility: CLINIC | Age: 64
End: 2019-12-12
Payer: COMMERCIAL

## 2019-12-12 VITALS — OXYGEN SATURATION: 97 % | DIASTOLIC BLOOD PRESSURE: 96 MMHG | SYSTOLIC BLOOD PRESSURE: 139 MMHG | HEART RATE: 89 BPM

## 2019-12-12 DIAGNOSIS — C44.319 BASAL CELL CARCINOMA (BCC) OF FOREHEAD: Primary | ICD-10-CM

## 2019-12-12 DIAGNOSIS — L57.0 AK (ACTINIC KERATOSIS): ICD-10-CM

## 2019-12-12 DIAGNOSIS — L81.4 LENTIGO: ICD-10-CM

## 2019-12-12 DIAGNOSIS — Z85.828 HISTORY OF SKIN CANCER: ICD-10-CM

## 2019-12-12 DIAGNOSIS — L82.1 SEBORRHEIC KERATOSIS: ICD-10-CM

## 2019-12-12 PROCEDURE — 17311 MOHS 1 STAGE H/N/HF/G: CPT | Performed by: DERMATOLOGY

## 2019-12-12 PROCEDURE — 13132 CMPLX RPR F/C/C/M/N/AX/G/H/F: CPT | Mod: 51 | Performed by: DERMATOLOGY

## 2019-12-12 PROCEDURE — 99204 OFFICE O/P NEW MOD 45 MIN: CPT | Mod: 25 | Performed by: DERMATOLOGY

## 2019-12-12 RX ORDER — CALCIPOTRIENE 50 UG/G
CREAM TOPICAL
Qty: 60 G | Refills: 3 | Status: SHIPPED | OUTPATIENT
Start: 2019-12-12 | End: 2020-02-24

## 2019-12-12 NOTE — PATIENT INSTRUCTIONS
Sutured Wound Care     [facility]: [phone]    [facility]: [phone]    ? No strenuous activity for 48 hours. Resume moderate activity in 48 hours. No heavy exercising until you are seen for follow up in one week.     ? Take Tylenol as needed for discomfort.                         ? Do not drink alcoholic beverages for 48 hours.     ? Keep the pressure bandage in place for 24 hours. If the bandage becomes blood tinged or loose, reinforce it with gauze and tape.        (Refer to the reverse side of this page for management of bleeding).    ? Remove pressure bandage in 24 hours     ? Leave the flat bandage in place until your follow up appointment.    ? Keep the bandage dry. Wash around it carefully.    ? If the tape becomes soiled or starts to come off, reinforce it with additional paper tape.    ? Do not smoke for 3 weeks; smoking is detrimental to wound healing.    ? It is normal to have swelling and bruising around the surgical site. The bruising will fade in approximately 10-14 days. Elevate the area to reduce swelling.    ? Numbness, itchiness and sensitivity to temperature changes can occur after surgery and may take up to 18 months to normalize.      POSSIBLE COMPLICATIONS    BLEEDIN. Leave the bandage in place.Use tightly rolled up gauze or a cloth to apply direct pressure over the bandage for 20 minutes.  2. Reapply pressure for an additional 20 minutes if necessary  3. Call the office or go to the nearest emergency room if pressure fails to stop the bleeding.  4. Use additional gauze and tape to maintain pressure once the bleeding has stopped.    PAIN:    1. Post operative pain should slowly get better, never worse.  2. A severe increase in pain may indicate a problem. Call the office if this occurs.    In case of emergency phone:Dr Nava [phone]

## 2019-12-12 NOTE — PROGRESS NOTES
Jacqueline Banegas , a 64 year old year old female patient, I was asked to see by Dr. Pineda for basal cell carcinoma on forehead.  She notes red spots on face.  Patient states this has been present for a while.  Patient reports the following symptoms:  rough .  Patient reports the following previous treatments none.  Patient reports the following modifying factors none.  Associated symptoms: none.  Patient has no other skin complaints today.  Remainder of the HPI, Meds, PMH, Allergies, FH, and SH was reviewed in chart.      Past Medical History:   Diagnosis Date     Adenomatous goiter      Allergic rhinitis      Basal cell carcinoma ,     scalp, chest, RT upper arm, back     DDD (degenerative disc disease), cervical      History of blood transfusion          Hyperlipidemia LDL goal < 130      Hypothyroidism      Migraine headaches      PFO (patent foramen ovale) 2010     Right knee DJD      Situational anxiety 9/10/2018     Squamous cell carcinoma of skin, unspecified 2018    left upper chest      Tubular adenoma 2005     Urticaria        Past Surgical History:   Procedure Laterality Date     APPENDECTOMY       BIOPSY      Skin Ca.     C  DELIVERY ONLY  ,,      C LAMINECTOMY,>2 SGMT,CERVICAL       C SHOULDER SURG PROC UNLISTED      RT     COLONOSCOPY  2011    Procedure:COLONOSCOPY; Colonoscopy, screening; Surgeon:AUDREY SPENCER; Location:[facility]     COLONOSCOPY WITH CO2 INSUFFLATION N/A 2017    Procedure: COLONOSCOPY WITH CO2 INSUFFLATION;  COLON-SCREENING / BINSFELD;  Surgeon: Trevor Raygoza MD;  Location: [facility]        Family History   Problem Relation Age of Onset     Cancer - colorectal Mother 65     Heart Disease Mother         IHSS     Hypertension Mother      Cancer Father         skin     Blood Disease Father         pernicious anemia     Hypertension Father      Hyperlipidemia Father      Asthma Daughter      Skin  Cancer Sister      Cancer Paternal Aunt         uterine     Diabetes Other         cousin, type I     Diabetes Other        Social History     Socioeconomic History     Marital status:      Spouse name: Not on file     Number of children: 4     Years of education: 16     Highest education level: Not on file   Occupational History     Occupation: RN, clinical director     Employer: [employer]   Social Needs     Financial resource strain: Not on file     Food insecurity:     Worry: Not on file     Inability: Not on file     Transportation needs:     Medical: Not on file     Non-medical: Not on file   Tobacco Use     Smoking status: Former Smoker     Packs/day: 0.25     Years: 10.00     Pack years: 2.50     Types: Cigarettes     Last attempt to quit: 1990     Years since quittin.9     Smokeless tobacco: Never Used   Substance and Sexual Activity     Alcohol use: No     Drug use: No     Sexual activity: Not Currently     Partners: Male     Birth control/protection: Post-menopausal   Lifestyle     Physical activity:     Days per week: Not on file     Minutes per session: Not on file     Stress: Not on file   Relationships     Social connections:     Talks on phone: Not on file     Gets together: Not on file     Attends Christian service: Not on file     Active member of club or organization: Not on file     Attends meetings of clubs or organizations: Not on file     Relationship status: Not on file     Intimate partner violence:     Fear of current or ex partner: Not on file     Emotionally abused: Not on file     Physically abused: Not on file     Forced sexual activity: Not on file   Other Topics Concern     Parent/sibling w/ CABG, MI or angioplasty before 65F 55M? No      Service No     Blood Transfusions No     Caffeine Concern No     Occupational Exposure No     Hobby Hazards No     Sleep Concern No     Stress Concern No     Weight Concern Yes     Special Diet No     Back Care No      Exercise Yes     Bike Helmet No     Seat Belt Yes     Self-Exams Yes   Social History Narrative     Not on file       Outpatient Encounter Medications as of 12/12/2019   Medication Sig Dispense Refill     ALPRAZolam (XANAX PO) For flying       aspirin 81 MG tablet Take 81 mg by mouth daily Reported on 5/9/2017       simvastatin (ZOCOR) 40 MG tablet Take 1 tablet by mouth daily 90 tablet 3     SUMAtriptan (IMITREX) 50 MG tablet Take 1-2 tablets ( mg) by mouth at onset of headache for migraine May repeat in 2 hours if needed: max 200 mg/day 9 tablet 11     SYNTHROID 112 MCG tablet Take 1 tablet (112 mcg) by mouth daily 90 tablet 3     [DISCONTINUED] simvastatin (ZOCOR) 40 MG tablet Take 1 tablet by mouth daily 90 tablet 3     [DISCONTINUED] SUMAtriptan (IMITREX) 50 MG tablet Take 1-2 tablets ( mg) by mouth at onset of headache for migraine May repeat in 2 hours if needed: max 200 mg/day 9 tablet 11     [DISCONTINUED] SYNTHROID 112 MCG tablet TAKE 1 TABLET BY MOUTH  DAILY 90 tablet 1     No facility-administered encounter medications on file as of 12/12/2019.              Review Of Systems  Skin: As above  Eyes: negative  Ears/Nose/Throat: negative  Respiratory: No shortness of breath, dyspnea on exertion, cough, or hemoptysis  Cardiovascular: negative  Gastrointestinal: negative  Genitourinary: negative  Musculoskeletal: negative  Neurologic: negative  Psychiatric: negative  Hematologic/Lymphatic/Immunologic: negative  Endocrine: negative      O:   NAD, WDWN, Alert & Oriented, Mood & Affect wnl, Vitals stable   Here today alone   BP (!) 139/96   Pulse 89   SpO2 97%    General appearance hari ii   Vitals stable   Alert, oriented and in no acute distress      Following lymph nodes palpated: Occipital, Cervical, Supraclavicular no lad   Mid super forehead at aireline 5mm red scaly papule    Gritty papules on face     Stuck on papules and brown macules on trunk and ext      The remainder of expanded  problem focused exam was unremarkable; the following areas were examined:  scalp/hair, conjunctiva/lids, face, neck, lips, chest, digits/nails, RUE, LUE.      Eyes: Conjunctivae/lids:Normal     ENT: Lips, buccal mucosa, tongue: normal    MSK:Normal    Cardiovascular: peripheral edema none    Pulm: Breathing Normal    Lymph Nodes: No Head and Neck Lymphadenopathy     Neuro/Psych: Orientation:Normal; Mood/Affect:Normal      A/P:  1. Hx of non-melanoma skin cancer, seborrheic keratosis, eltnigo  2. Basal cell carcinoma forehead  3. Actinic keratosis   Efudex discussed with patient   Using 5-Flurouracil Cream    5-Fluorouracil (5FU) topical cream (brand names Efudex, Carac) is a prescription topical medicine to treat actinic keratoses (pre-squamous cell skin cancer lesions), sun-damaged skin as well as superficial skin cancers.    When applied the areas of sun-damaged skin, the 5FU will  find  damaged skin cells and destroy them.   During treatment, the skin will become red and look very irritated. This is the expected  normal response,  Some patients using 5FU show minimal redness and scaling while others have a very  vigorous  response where the skin scabs and peels. The important thing to realize is that 5FU is treating sun-damaged skin that carries skin cancer risk.      While the skin is irritated, open, sore or scabbed you can apply aquaphor, vaseline or 1% hydrocortisone cream in the morning.     You should apply a thin layer of the cream to the affected area twice a day for 2  weeks every night. A strip of cream the length of your finger tip should be enough to cover your entire face.  For tougher skin like arms, legs, or back, we may suggest longer treatment plans.  However if you react really strong and fast, you might stop earlier or use less frequently. - please call if it is very strong and you are concern you might need to stop early.     If you prescription coverage allows we may add calcipotriene  (Dovonex ), a vitamin-D derivative, to the treatment plan.  In these cases we will have you mix the calcipotriene with the efudex to help shorten the treatment course and improve outcomes.      Typically very strong reactions are related to lots of underlying sun damage, and this means you are getting a good response to the medication. However, there is no need to be miserable while using this. Please let us know if you are having trouble or concerns!   BENIGN LESIONS DISCUSSED WITH PATIENT:  I discussed the specifics of tumor, prognosis, and genetics of benign lesions.  I explained that treatment of these lesions would be purely cosmetic and not medically neccessary.  I discussed with patient different removal options including excision, cautery and /or laser.      Nature and genetics of benign skin lesions dicussed with patient.  Signs and Symptoms of skin cancer discussed with patient.  ABCDEs of melanoma reviewed with patient.  Patient encouraged to perform monthly skin exams.  UV precautions reviewed with patient.  Patient to follow up with Primary Care provider regarding elevated blood pressure.    Skin care regimen reviewed with patient: Eliminate harsh soaps, i.e. Dial, zest, irsih spring; Mild soaps such as Cetaphil or Dove sensitive skin, avoid hot or cold showers, aggressive use of emollients including vanicream, cetaphil or cerave discussed with patient.    Risks of non-melanoma skin cancer discussed with patient   Return to clinic 2-3 months    PROCEDURE NOTE  Superior forehead basal cell carcinoma   MOHS:   Location    After PGACAC discussed with patient, decision for Mohs surgery was made. Indication for Mohs was Location. Patient confirmed the site with Dr. Nava.  After anesthesia with LEC, the tumor was excised using standard Mohs technique in 1 stages(s).  CLEAR MARGINS OBTAINED and Final defect size was 0.9 x 1.1 cm.       REPAIR COMPLEX: Because of the tightness of the surrounding skin and  Because of the size and full thickness nature of the defect, a complex closure was planned. After LEC anesthesia and prep, Burow's triangles were excised in the relaxed skin tension lines. The wound edges were widely undermined by dissection in the subcutaneous plane until adequate tissue mobility was obtained. Hemostasis was obtained. The wound edges were closed in a layered fashion using Vicryl and Fast Absorbing Plain Gut sutures. Postoperative length was 3.4 cm.   EBL minimal; complications none; wound care routine.  The patient was discharged in good condition and will return in one week for wound evaluation.

## 2019-12-12 NOTE — LETTER
2019         RE: Jacqueline Banegas  [address]  [facility] [address]        Dear Colleague,    Thank you for referring your patient, Jacqueline Banegas, to the [facility]. Please see a copy of my visit note below.    Surgical Office Location:  [facility]  [address] [place] [id number]      Jacqueline Banegas , a 64 year old year old female patient, I was asked to see by Dr. Pineda for basal cell carcinoma on forehead.  She notes red spots on face.  Patient states this has been present for a while.  Patient reports the following symptoms:  rough .  Patient reports the following previous treatments none.  Patient reports the following modifying factors none.  Associated symptoms: none.  Patient has no other skin complaints today.  Remainder of the HPI, Meds, PMH, Allergies, FH, and SH was reviewed in chart.      Past Medical History:   Diagnosis Date     Adenomatous goiter      Allergic rhinitis      Basal cell carcinoma ,     scalp, chest, RT upper arm, back     DDD (degenerative disc disease), cervical      History of blood transfusion          Hyperlipidemia LDL goal < 130      Hypothyroidism      Migraine headaches      PFO (patent foramen ovale) 2010     Right knee DJD      Situational anxiety 9/10/2018     Squamous cell carcinoma of skin, unspecified 2018    left upper chest      Tubular adenoma 2005     Urticaria        Past Surgical History:   Procedure Laterality Date     APPENDECTOMY  1967     BIOPSY      Skin Ca.     C  DELIVERY ONLY  ,,      C LAMINECTOMY,>2 SGMT,CERVICAL       C SHOULDER SURG PROC UNLISTED      RT     COLONOSCOPY  2011    Procedure:COLONOSCOPY; Colonoscopy, screening; Surgeon:AUDREY SPENCER; Location:[facility]     COLONOSCOPY WITH CO2 INSUFFLATION N/A 2017    Procedure: COLONOSCOPY WITH CO2 INSUFFLATION;  COLON-SCREENING / JESSA;  Surgeon: Idalmis  Trevor Lockhart MD;  Location: [facility]        Family History   Problem Relation Age of Onset     Cancer - colorectal Mother 65     Heart Disease Mother         IHSS     Hypertension Mother      Cancer Father         skin     Blood Disease Father         pernicious anemia     Hypertension Father      Hyperlipidemia Father      Asthma Daughter      Skin Cancer Sister      Cancer Paternal Aunt         uterine     Diabetes Other         cousin, type I     Diabetes Other        Social History     Socioeconomic History     Marital status:      Spouse name: Not on file     Number of children: 4     Years of education: 16     Highest education level: Not on file   Occupational History     Occupation: RN, clinical director     Employer: [employer]   Social Needs     Financial resource strain: Not on file     Food insecurity:     Worry: Not on file     Inability: Not on file     Transportation needs:     Medical: Not on file     Non-medical: Not on file   Tobacco Use     Smoking status: Former Smoker     Packs/day: 0.25     Years: 10.00     Pack years: 2.50     Types: Cigarettes     Last attempt to quit: 1990     Years since quittin.9     Smokeless tobacco: Never Used   Substance and Sexual Activity     Alcohol use: No     Drug use: No     Sexual activity: Not Currently     Partners: Male     Birth control/protection: Post-menopausal   Lifestyle     Physical activity:     Days per week: Not on file     Minutes per session: Not on file     Stress: Not on file   Relationships     Social connections:     Talks on phone: Not on file     Gets together: Not on file     Attends Alevism service: Not on file     Active member of club or organization: Not on file     Attends meetings of clubs or organizations: Not on file     Relationship status: Not on file     Intimate partner violence:     Fear of current or ex partner: Not on file     Emotionally abused: Not on file     Physically abused: Not on file      Forced sexual activity: Not on file   Other Topics Concern     Parent/sibling w/ CABG, MI or angioplasty before 65F 55M? No      Service No     Blood Transfusions No     Caffeine Concern No     Occupational Exposure No     Hobby Hazards No     Sleep Concern No     Stress Concern No     Weight Concern Yes     Special Diet No     Back Care No     Exercise Yes     Bike Helmet No     Seat Belt Yes     Self-Exams Yes   Social History Narrative     Not on file       Outpatient Encounter Medications as of 12/12/2019   Medication Sig Dispense Refill     ALPRAZolam (XANAX PO) For flying       aspirin 81 MG tablet Take 81 mg by mouth daily Reported on 5/9/2017       simvastatin (ZOCOR) 40 MG tablet Take 1 tablet by mouth daily 90 tablet 3     SUMAtriptan (IMITREX) 50 MG tablet Take 1-2 tablets ( mg) by mouth at onset of headache for migraine May repeat in 2 hours if needed: max 200 mg/day 9 tablet 11     SYNTHROID 112 MCG tablet Take 1 tablet (112 mcg) by mouth daily 90 tablet 3     [DISCONTINUED] simvastatin (ZOCOR) 40 MG tablet Take 1 tablet by mouth daily 90 tablet 3     [DISCONTINUED] SUMAtriptan (IMITREX) 50 MG tablet Take 1-2 tablets ( mg) by mouth at onset of headache for migraine May repeat in 2 hours if needed: max 200 mg/day 9 tablet 11     [DISCONTINUED] SYNTHROID 112 MCG tablet TAKE 1 TABLET BY MOUTH  DAILY 90 tablet 1     No facility-administered encounter medications on file as of 12/12/2019.              Review Of Systems  Skin: As above  Eyes: negative  Ears/Nose/Throat: negative  Respiratory: No shortness of breath, dyspnea on exertion, cough, or hemoptysis  Cardiovascular: negative  Gastrointestinal: negative  Genitourinary: negative  Musculoskeletal: negative  Neurologic: negative  Psychiatric: negative  Hematologic/Lymphatic/Immunologic: negative  Endocrine: negative      O:   NAD, WDWN, Alert & Oriented, Mood & Affect wnl, Vitals stable   Here today alone   BP (!) 139/96   Pulse 89    SpO2 97%    General appearance hari ii   Vitals stable   Alert, oriented and in no acute distress      Following lymph nodes palpated: Occipital, Cervical, Supraclavicular no lad   Mid super forehead at aireline 5mm red scaly papule    Gritty papules on face     Stuck on papules and brown macules on trunk and ext      The remainder of expanded problem focused exam was unremarkable; the following areas were examined:  scalp/hair, conjunctiva/lids, face, neck, lips, chest, digits/nails, RUE, LUE.      Eyes: Conjunctivae/lids:Normal     ENT: Lips, buccal mucosa, tongue: normal    MSK:Normal    Cardiovascular: peripheral edema none    Pulm: Breathing Normal    Lymph Nodes: No Head and Neck Lymphadenopathy     Neuro/Psych: Orientation:Normal; Mood/Affect:Normal      A/P:  1. Hx of non-melanoma skin cancer, seborrheic keratosis, eltnigo  2. Basal cell carcinoma forehead  3. Actinic keratosis   Efudex discussed with patient   Using 5-Flurouracil Cream    5-Fluorouracil (5FU) topical cream (brand names Efudex, Carac) is a prescription topical medicine to treat actinic keratoses (pre-squamous cell skin cancer lesions), sun-damaged skin as well as superficial skin cancers.    When applied the areas of sun-damaged skin, the 5FU will  find  damaged skin cells and destroy them.   During treatment, the skin will become red and look very irritated. This is the expected  normal response,  Some patients using 5FU show minimal redness and scaling while others have a very  vigorous  response where the skin scabs and peels. The important thing to realize is that 5FU is treating sun-damaged skin that carries skin cancer risk.      While the skin is irritated, open, sore or scabbed you can apply aquaphor, vaseline or 1% hydrocortisone cream in the morning.     You should apply a thin layer of the cream to the affected area twice a day for 2  weeks every night. A strip of cream the length of your finger tip should be enough to cover your  entire face.  For tougher skin like arms, legs, or back, we may suggest longer treatment plans.  However if you react really strong and fast, you might stop earlier or use less frequently. - please call if it is very strong and you are concern you might need to stop early.     If you prescription coverage allows we may add calcipotriene (Dovonex ), a vitamin-D derivative, to the treatment plan.  In these cases we will have you mix the calcipotriene with the efudex to help shorten the treatment course and improve outcomes.      Typically very strong reactions are related to lots of underlying sun damage, and this means you are getting a good response to the medication. However, there is no need to be miserable while using this. Please let us know if you are having trouble or concerns!   BENIGN LESIONS DISCUSSED WITH PATIENT:  I discussed the specifics of tumor, prognosis, and genetics of benign lesions.  I explained that treatment of these lesions would be purely cosmetic and not medically neccessary.  I discussed with patient different removal options including excision, cautery and /or laser.      Nature and genetics of benign skin lesions dicussed with patient.  Signs and Symptoms of skin cancer discussed with patient.  ABCDEs of melanoma reviewed with patient.  Patient encouraged to perform monthly skin exams.  UV precautions reviewed with patient.  Patient to follow up with Primary Care provider regarding elevated blood pressure.    Skin care regimen reviewed with patient: Eliminate harsh soaps, i.e. Dial, zest, irsih spring; Mild soaps such as Cetaphil or Dove sensitive skin, avoid hot or cold showers, aggressive use of emollients including vanicream, cetaphil or cerave discussed with patient.    Risks of non-melanoma skin cancer discussed with patient   Return to clinic 2-3 months    PROCEDURE NOTE  Superior forehead basal cell carcinoma   MOHS:   Location    After PGACAC discussed with patient, decision for  Mohs surgery was made. Indication for Mohs was Location. Patient confirmed the site with Dr. Nava.  After anesthesia with LEC, the tumor was excised using standard Mohs technique in 1 stages(s).  CLEAR MARGINS OBTAINED and Final defect size was 0.9 x 1.1 cm.       REPAIR COMPLEX: Because of the tightness of the surrounding skin and Because of the size and full thickness nature of the defect, a complex closure was planned. After LEC anesthesia and prep, Burow's triangles were excised in the relaxed skin tension lines. The wound edges were widely undermined by dissection in the subcutaneous plane until adequate tissue mobility was obtained. Hemostasis was obtained. The wound edges were closed in a layered fashion using Vicryl and Fast Absorbing Plain Gut sutures. Postoperative length was 3.4 cm.   EBL minimal; complications none; wound care routine.  The patient was discharged in good condition and will return in one week for wound evaluation.          Again, thank you for allowing me to participate in the care of your patient.        Sincerely,        Dustin Nava MD

## 2019-12-18 ENCOUNTER — ALLIED HEALTH/NURSE VISIT (OUTPATIENT)
Dept: DERMATOLOGY | Facility: CLINIC | Age: 64
End: 2019-12-18
Payer: COMMERCIAL

## 2019-12-18 DIAGNOSIS — Z48.01 ENCOUNTER FOR CHANGE OR REMOVAL OF SURGICAL WOUND DRESSING: Primary | ICD-10-CM

## 2019-12-18 PROCEDURE — 99207 ZZC NO CHARGE NURSE ONLY: CPT

## 2019-12-18 NOTE — PATIENT INSTRUCTIONS

## 2019-12-18 NOTE — NURSING NOTE
Pt returned to clinic for post surgery 1 week follow up bandage change. Pt has no complaints, denies pain. Bandage removed from superior forehead, area cleansed with normal saline. Site is healing and wound edges approximating well. Reapplied new steri strips and paper tape.    Advised to watch for signs/sx of infection; spreading redness, drainage, odor, fever. Call or report promptly to clinic. Pt given written instructions and informed to rtc as needed. Patient verbalized understanding.     EDIS Bravo-BSN-PHN  [facility]  [phone]

## 2020-02-19 NOTE — PROGRESS NOTES
"Subjective     Jacqueline Banegas is a 65 year old female who presents to clinic today for the following health issues:    HPI   Chief Complaint   Patient presents with     Flank Pain     right side since 9/2019     Jacqueline Banegas is a 65 year old female who presents with right flank pain, dull to burning in quality. Symptom onset has been gradual, unchanged for a time period of 6 months. Severity is described as mild. Course of her symptoms over time is waxing and waning.    Reviewed and updated as needed this visit by Provider  Tobacco  Allergies  Meds  Problems  Med Hx  Surg Hx  Fam Hx         Review of Systems   ROS COMP: CONSTITUTIONAL: NEGATIVE for fever, chills, change in weight  INTEGUMENTARY/SKIN: NEGATIVE for worrisome rashes, moles or lesions  RESP: NEGATIVE for significant cough or SOB  CV: NEGATIVE for chest pain, palpitations or peripheral edema  GI: NEGATIVE for constipation, diarrhea, nausea, poor appetite, vomiting and weight loss  : NEGATIVE for frequency, dysuria, or hematuria  MUSCULOSKELETAL: NEGATIVE for significant arthralgias or myalgia  NEURO: NEGATIVE for weakness, dizziness or paresthesias  ENDOCRINE: NEGATIVE for temperature intolerance, skin/hair changes  HEME: NEGATIVE for bleeding problems      Objective    BP (!) 126/90   Pulse 80   Temp 98.3  F (36.8  C) (Oral)   Resp 14   Ht 1.626 m (5' 4\")   Wt 77.6 kg (171 lb)   SpO2 96%   Breastfeeding No   BMI 29.35 kg/m    Body mass index is 29.35 kg/m .  Physical Exam   GENERAL: alert, no distress and over weight  EYES: Eyes grossly normal to inspection, PERRL and conjunctivae and sclerae normal  NECK: no adenopathy, no asymmetry, masses, or scars and thyroid normal to palpation  RESP: lungs clear to auscultation - no rales, rhonchi or wheezes  CV: regular rate and rhythm, normal S1 S2, no S3 or S4, no murmur, click or rub, no peripheral edema   ABDOMEN: soft, nontender, no hepatosplenomegaly, no masses and bowel sounds " normal  MS: no gross musculoskeletal defects noted, no edema  SKIN: no suspicious lesions or rashes  PSYCH: mentation appears normal, affect normal/bright    Diagnostic Test Results:  Labs reviewed in Epic  No results found for any visits on 02/24/20.        Assessment & Plan     (R10.9) Right flank pain  (primary encounter diagnosis)  Comment: Differential diagnoses would include: radicular pain, post herpetic neuralgia, gastritis, PUD, biliary colic, musculoligamentous pain, mass   Plan: UA reflex to Microscopic and Culture, CT         Abdomen Pelvis w Contrast        Follow-up pending CT to consider further evaluation or medication trial     (I10) Hypertension goal BP (blood pressure) < 140/90  Comment: new diagnosis   Plan: Counseled to make better food choices, exercise as tolerated, and lose weight. See AVS               Return in about 1 month (around 3/24/2020) for free nurse only blood pressure check.    Venessa Carter MD  [facility]

## 2020-02-24 ENCOUNTER — OFFICE VISIT (OUTPATIENT)
Dept: FAMILY MEDICINE | Facility: CLINIC | Age: 65
End: 2020-02-24
Payer: COMMERCIAL

## 2020-02-24 VITALS
DIASTOLIC BLOOD PRESSURE: 90 MMHG | SYSTOLIC BLOOD PRESSURE: 140 MMHG | TEMPERATURE: 98.3 F | HEART RATE: 80 BPM | WEIGHT: 171 LBS | RESPIRATION RATE: 14 BRPM | HEIGHT: 64 IN | OXYGEN SATURATION: 96 % | BODY MASS INDEX: 29.19 KG/M2

## 2020-02-24 DIAGNOSIS — Z78.0 ASYMPTOMATIC POSTMENOPAUSAL STATUS: ICD-10-CM

## 2020-02-24 DIAGNOSIS — F41.8 SITUATIONAL ANXIETY: ICD-10-CM

## 2020-02-24 DIAGNOSIS — R10.9 RIGHT FLANK PAIN: Primary | ICD-10-CM

## 2020-02-24 DIAGNOSIS — I10 HYPERTENSION GOAL BP (BLOOD PRESSURE) < 140/90: ICD-10-CM

## 2020-02-24 PROBLEM — Z82.49 FAMILY HISTORY OF IDIOPATHIC HYPERTROPHIC SUBAORTIC STENOSIS: Status: RESOLVED | Noted: 2019-12-11 | Resolved: 2020-02-24

## 2020-02-24 PROBLEM — Z82.49 FAMILY HISTORY OF IDIOPATHIC HYPERTROPHIC SUBAORTIC STENOSIS: Status: ACTIVE | Noted: 2019-12-11

## 2020-02-24 LAB
ALBUMIN UR-MCNC: NEGATIVE MG/DL
APPEARANCE UR: CLEAR
BILIRUB UR QL STRIP: NEGATIVE
COLOR UR AUTO: YELLOW
GLUCOSE UR STRIP-MCNC: NEGATIVE MG/DL
HGB UR QL STRIP: ABNORMAL
KETONES UR STRIP-MCNC: NEGATIVE MG/DL
LEUKOCYTE ESTERASE UR QL STRIP: NEGATIVE
NITRATE UR QL: NEGATIVE
PH UR STRIP: 5.5 PH (ref 5–7)
RBC #/AREA URNS AUTO: NORMAL /HPF
SOURCE: ABNORMAL
SP GR UR STRIP: 1.02 (ref 1–1.03)
UROBILINOGEN UR STRIP-ACNC: 0.2 EU/DL (ref 0.2–1)
WBC #/AREA URNS AUTO: NORMAL /HPF

## 2020-02-24 PROCEDURE — 99214 OFFICE O/P EST MOD 30 MIN: CPT | Performed by: FAMILY MEDICINE

## 2020-02-24 PROCEDURE — 81001 URINALYSIS AUTO W/SCOPE: CPT | Performed by: FAMILY MEDICINE

## 2020-02-24 RX ORDER — ALPRAZOLAM 0.25 MG
.25-.5 TABLET ORAL 3 TIMES DAILY PRN
Qty: 12 TABLET | Refills: 1 | Status: SHIPPED | OUTPATIENT
Start: 2020-02-24 | End: 2020-10-27

## 2020-02-24 ASSESSMENT — MIFFLIN-ST. JEOR: SCORE: 1305.65

## 2020-02-24 NOTE — PATIENT INSTRUCTIONS
Call the imaging center at [phone] or  [phone] to schedule your CT. Then, follow-up with me by Ankit for next steps.     Did you know you can lower your blood pressure with your daily habits?    *Losing 20 pounds of weight lowers blood pressure 5 to 20 points.  *Eating a low-fat diet rich in fruits, vegetables and low-fat dairy lowers blood pressure 8 to 14 points.  *Eating a low-salt diet lowers blood pressure 2 to 8 points.  *Exercising regularly lowers blood pressure 4 to 9 points.  *Reducing alcohol use lowers blood pressure 2 to 4 points.    It is recommended that you get a vaccination for shingles called Shingrix (given as 2 shots, 2 to 6 months apart), even if you have already had the Zostavax vaccine. Discuss getting the Shingix vaccine from your pharmacist, or schedule an ancillary shot visit here. Some insurances do not cover the cost of these vaccines.      Please schedule a DEXA bone scan for osteoporosis testing at your convenience.  Call our appointment line at [phone] or go to [email or web address].    Venessa Carter MD

## 2020-02-28 ENCOUNTER — ANCILLARY PROCEDURE (OUTPATIENT)
Dept: CT IMAGING | Facility: CLINIC | Age: 65
End: 2020-02-28
Attending: FAMILY MEDICINE
Payer: COMMERCIAL

## 2020-02-28 DIAGNOSIS — R10.9 RIGHT FLANK PAIN: ICD-10-CM

## 2020-02-28 PROCEDURE — 74177 CT ABD & PELVIS W/CONTRAST: CPT | Mod: TC

## 2020-02-28 RX ORDER — IOPAMIDOL 755 MG/ML
500 INJECTION, SOLUTION INTRAVASCULAR ONCE
Status: COMPLETED | OUTPATIENT
Start: 2020-02-28 | End: 2020-02-28

## 2020-02-28 RX ADMIN — IOPAMIDOL 100 ML: 755 INJECTION, SOLUTION INTRAVASCULAR at 08:30

## 2020-02-28 RX ADMIN — Medication 60 ML: at 08:31

## 2020-02-28 NOTE — RESULT ENCOUNTER NOTE
Please call patient:    Your CT is normal. This is reassuring. We can try stopping aspirin to rule out medication side effect like stomach irritation or referral to physical therapy for muscular pain.    Call or return to clinic as needed if these symptoms worsen or fail to improve as anticipated so we can consider further testing or referral. We also discussed a possible medication trial, if your symptoms are bothersome.    Venessa Carter MD

## 2020-03-15 ENCOUNTER — HEALTH MAINTENANCE LETTER (OUTPATIENT)
Age: 65
End: 2020-03-15

## 2020-09-28 NOTE — PROGRESS NOTES
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 ? Skin cancers on leg  2019 - basal cell carcinoma on the left lateral chest wall  6/18/2018 - Superficial basal cell carcinoma on left mid-arm extending to deep and lateral margins  6/18/2018 - Superficial basal cell carcinoma on left lateral mid-upper arm, narrowly excised  6/18/2018 - Nodular basal cell carcinoma on right upper chest, extending to deep margin  6/18/2018 - Superficial basal cell carcinoma on chest midline at T3, narrowly excised  6/18/2018 - Squamous cell carcinoma in situ on left upper chest, narrowly excised     Family history of skin cancer : YES - non-melanoma skin cancer in father and sister.  Autoimmune : NO     Ancestry : Edith, [place], [place], [place]      Sun Exposure History  Previous history of significant sun exposure:  Blistering sunburns : YES  Tanning beds : YES - when younger.  Sunscreen Use : YES, SPF : 50.  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  Refer to electronic medical record (EMR) for past medical history and medications.      ROS: 14 point review of systems was negative except the symptoms listed above in the HPI.        OBJECTIVE:    GENERAL: healthy, alert and no distress.  HEENT: PERRL. Conjunctiva, sclera clear.  SKIN: Choudhary Skin Type - I.  This patient was examined from the top of the head to the bottom of the feet  including scalp, face, neck, trunk, buttocks, both arms, both legs, both hands, both feet, and all fingers and toes. The dermatoscope was used to help evaluate pigmented lesions.  Skin Pertinent Findings:  Face: actinic damage, brown macules    Trunk, arms, legs :           Brown, macule(s) most consistent with benign solar lentigo          Raised, coarse textured, stuck appearing lesion consistent with seborrheic keratosis .          Slightly raised, red lesion(s) consistent with capillary hemangioma          Brown macules of various sizes and shapes most consistent with (benign) melanocytic nevi            Right mid back- 4 mm erythematous, scaly lesion ? Basal cell carcinoma ? Squamous cell carcinoma ? Actinic keratosis ? Other                           Right upper arm- 3 mm erythematous, scaly, non indurated lesion                      Erythematous, scaly, non-indurated lesion(s) most consistent with actinic keratosis   Name: Liquid nitrogen Cry-Ac cryotherapy  Indication: Pre-malignant actinic keratosis  Completed by: Katlyn Sultana MD.  Note : Discussed natural history of lesion and treatment options. Prior to treatment, we discussed inflammation, tenderness post-procedure, the healing process, and the risks of pain, infection, scarring, blistering, and hypo-/hyperpigmentation after healing. Explained that these lesions may grow back and may need additional treatment or re-evaluation. The patient understood and verbally agreed to proceed with cryotherapy.    Each actinic keratosis was treated using liquid nitrogen Cry-Ac with a two five second bursts with a pause to allow for the area to thaw.    The patient tolerated the procedure well and left in good condition. If this lesion should persist or recur, then it needs  to be re-evaluated.  Total number of lesions treated: 1          ASSESSMENT:     Encounter Diagnoses   Name Primary?     Neoplasm of uncertain behavior of skin Yes     Seborrheic keratosis      Solar lentiginosis      Melanocytic nevi of trunk      History of squamous cell carcinoma in situ of skin      History of basal cell carcinoma      Actinic keratosis      Need for prophylactic vaccination and inoculation against influenza          PLAN:   Patient Instructions   FUTURE APPOINTMENTS  Follow up per pathology report. You will be notified, generally via letter or MyChart, in approximately 10 days. If there is anything we need to discuss or further treatment needed, I will call you to discuss it.    Vaseline and bandage for 5-7 days.     Can shower normally.     Skin exam in 6 months.         WOUND CARE INSTRUCTIONS  1. Wash hands before every dressing change.  2. Change the dressing after 24 hours and once daily, or earlier if it becomes saturated.  3. Wash the wound area with a mild soap, then rinse.  4. Gently pat dry with a sterile gauze or Q-tip.  5. Using a Q-tip, apply Vaseline or Aquaphor only over entire wound. DO NOT use Neosporin - as many people react to neomycin.  6. Finally, cover with a bandage or sterile non-stick gauze with micropore paper tape.  7. Repeat once daily until wound has healed.      Soap, water and shampoo will not hurt this area.    Do not go swimming or take baths, but showering is encouraged.    Limit use of the area where the procedure was done for a few days to allow for optimal healing.    Signs of Infection:  Infection can occur in any area where skin has been disrupted. If you notice persistent redness, swelling, colored drainage, increasing pain, fever or other signs of infection, please call us at: [phone] and ask to have me or my colleague paged. We will call you back to discuss.    If you experience bleeding:  Wash hands and hold firm pressure on the area for 10  "minutes without checking to see if the bleeding has stopped. \"Checking\" pulls off the protective wound clot and restarts the bleeding all over again. Re-apply pressure for 10 minutes if necessary to stop bleeding.  Use additional sterile gauze and tape to maintain pressure once bleeding has stopped.  If bleeding continues, then call back to clinic at [phone].    PATIENT INFORMATION : WOUNDS  During the healing process you will notice a number of changes.     All wounds normally drain.  The larger the wound the more drainage there will be.  After 7-10 days, you will notice the wound beginning to shrink and new skin will begin to grow.  The wound is healed when you can see that skin has formed over the entire area.  A healed wound has a healthy, shiny look to the surface and is red to dark pink in color to normalize.  Wounds may take approximately 4-6 weeks to heal.  Larger wounds may take 6-8 weeks. After the wound is healed you may discontinue dressing changes.    All wounds develop a small halo of redness surrounding the wound which means that healing is occurring. Severe itching with extensive redness usually indicates sensitivity to the ointment or bandage tape used to dress the wound.  You should call our office if severe itching with extensive redness develops.    Swelling  and/or discoloration around your surgical site is common, particularly when performed around the eye.  You may experience a sensation of tightness as your wound heals. This is normal and will gradually subside.  Your healed wound may be sensitive to temperature changes. This sensitivity improves with time, but if you re having a lot of discomfort, try to avoid temperature extremes.  Patients frequently experience itching after their wound appears to have healed because of the continue healing under the skin.  Plain Vaseline will help relieve the itching.        The patient was counseled about sunscreens and sun avoidance. The patient " was counseled to check the skin regularly and report any lesion that is new, changing, itching, scabbing, bleeding or otherwise bothersome. The patient was discharged ambulatory and in stable condition.        TT: 25 minutes.  CT: 15 minutes.

## 2020-09-29 ENCOUNTER — OFFICE VISIT (OUTPATIENT)
Dept: FAMILY MEDICINE | Facility: CLINIC | Age: 65
End: 2020-09-29
Payer: COMMERCIAL

## 2020-09-29 VITALS — SYSTOLIC BLOOD PRESSURE: 132 MMHG | DIASTOLIC BLOOD PRESSURE: 84 MMHG

## 2020-09-29 DIAGNOSIS — L57.0 ACTINIC KERATOSIS: ICD-10-CM

## 2020-09-29 DIAGNOSIS — Z23 NEED FOR PROPHYLACTIC VACCINATION AND INOCULATION AGAINST INFLUENZA: ICD-10-CM

## 2020-09-29 DIAGNOSIS — L82.1 SEBORRHEIC KERATOSIS: ICD-10-CM

## 2020-09-29 DIAGNOSIS — Z86.007 HISTORY OF SQUAMOUS CELL CARCINOMA IN SITU OF SKIN: ICD-10-CM

## 2020-09-29 DIAGNOSIS — Z85.828 HISTORY OF BASAL CELL CARCINOMA: ICD-10-CM

## 2020-09-29 DIAGNOSIS — L81.4 SOLAR LENTIGINOSIS: ICD-10-CM

## 2020-09-29 DIAGNOSIS — D22.5 MELANOCYTIC NEVI OF TRUNK: ICD-10-CM

## 2020-09-29 DIAGNOSIS — D48.5 NEOPLASM OF UNCERTAIN BEHAVIOR OF SKIN: Primary | ICD-10-CM

## 2020-09-29 PROCEDURE — 90662 IIV NO PRSV INCREASED AG IM: CPT | Performed by: FAMILY MEDICINE

## 2020-09-29 PROCEDURE — 90471 IMMUNIZATION ADMIN: CPT | Performed by: FAMILY MEDICINE

## 2020-09-29 PROCEDURE — 99213 OFFICE O/P EST LOW 20 MIN: CPT | Mod: 25 | Performed by: FAMILY MEDICINE

## 2020-09-29 PROCEDURE — 17000 DESTRUCT PREMALG LESION: CPT | Mod: 59 | Performed by: FAMILY MEDICINE

## 2020-09-29 PROCEDURE — 88305 TISSUE EXAM BY PATHOLOGIST: CPT | Mod: TC | Performed by: FAMILY MEDICINE

## 2020-09-29 PROCEDURE — 11300 SHAVE SKIN LESION 0.5 CM/<: CPT | Performed by: FAMILY MEDICINE

## 2020-09-29 NOTE — PROCEDURES
Name : Shave Excision  Indication : Excision of tissue for pathology evaluation.  Location(s) : Right mid back- 4 mm erythematous, scaly lesion ? Basal cell carcinoma ? Squamous cell carcinoma ? Actinic keratosis ? other  Completed by : Katlyn Sultana MD  Photo Taken : yes.  Anesthesia : Patient was anesthetized by infiltrating the area surrounding the lesion with 1% lidocaine.   epinephrine [id number] : Yes.  Note : Discussed the risk of pain, infection, scarring, hypo- or hyperpigmentation and recurrence or need for re-treatment. The benefits of treatment and alternative treatments were also discussed.    During this procedure, the universal protocol was utilized. The patient's identity was confirmed by no less than two patient identifiers, correct procedure was verified, correct site was verified and marked as applicable and a final pause was completed.    Sterile technique was used throughout the procedure. The skin was cleaned and prepped with surgical cleanser. Once adequate anesthesia was obtained, the lesion was removed with a deep scallop shave procedure. The specimen was sent to pathology.    Direct pressure and aluminum chloride and monopolar cautery was applied for hemostasis. No bleeding was present upon the completion of the procedure. The wound was coated with antibacterial ointment. A dry sterile dressing was applied. Patient tolerated the procedure well and left in satisfactory condition.    Primary provider and referring provider will be informed regarding the tissue report when it returns.

## 2020-09-29 NOTE — LETTER
9/29/2020         RE: Jacqueline Banegas  [address]  [place] [address]        Dear Colleague,    Thank you for referring your patient, [facility]as, [facility]. Please see a copy of my visit note below.    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 ? Skin cancers on leg  2019 - basal cell carcinoma on the left lateral chest wall  6/18/2018 - Superficial basal cell carcinoma on left mid-arm extending to deep and lateral margins  6/18/2018 - Superficial basal cell carcinoma on left lateral mid-upper arm, narrowly excised  6/18/2018 - Nodular basal cell carcinoma on right upper chest, extending to deep margin  6/18/2018 - Superficial basal cell carcinoma on chest midline at T3, narrowly excised  6/18/2018 - Squamous cell carcinoma in situ on left upper chest, narrowly excised     Family history of skin cancer : YES - non-melanoma skin cancer in father and sister.  Autoimmune : NO     Ancestry : Edith, [place], [place], [place]      Sun Exposure History  Previous history of significant sun exposure:  Blistering sunburns : YES  Tanning beds : YES - when younger.  Sunscreen Use : YES, SPF : 50.  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  Refer to electronic medical record (EMR) for past medical history and medications.      ROS: 14 point review of systems was negative except the symptoms listed above in the HPI.        OBJECTIVE:   GENERAL: healthy, alert and no distress.  HEENT: PERRL. Conjunctiva, sclera clear.  SKIN: Choudhary Skin Type - I.  This patient was examined from the top of the head to the bottom of the feet  including scalp, face, neck, trunk, buttocks, both arms, both legs, both hands, both feet, and all fingers and toes. The dermatoscope was used to help evaluate pigmented lesions.  Skin Pertinent Findings:  Face: actinic damage, brown macules    Trunk, arms, legs :           Brown, macule(s) most consistent with benign solar lentigo          Raised, coarse textured, stuck appearing lesion consistent with seborrheic keratosis .          Slightly raised, red lesion(s) consistent with capillary hemangioma          Brown macules of various sizes and shapes most consistent with (benign) melanocytic nevi            Right mid back- 4 mm erythematous, scaly lesion ? Basal cell carcinoma ? Squamous cell carcinoma ? Actinic keratosis ? Other                           Right upper arm- 3 mm erythematous, scaly, non indurated lesion                      Erythematous, scaly, non-indurated lesion(s) most consistent with actinic keratosis   Name: Liquid nitrogen Cry-Ac cryotherapy  Indication: Pre-malignant actinic keratosis  Completed by: Katlyn Sultana MD.  Note : Discussed natural history of lesion and treatment options. Prior to treatment, we discussed inflammation, tenderness post-procedure, the healing process, and the risks of pain, infection, scarring, blistering, and hypo-/hyperpigmentation after healing. Explained that these lesions may grow back and may need additional treatment or re-evaluation. The patient understood and verbally agreed to proceed with cryotherapy.    Each actinic  keratosis was treated using liquid nitrogen Cry-Ac with a two five second bursts with a pause to allow for the area to thaw.    The patient tolerated the procedure well and left in good condition. If this lesion should persist or recur, then it needs to be re-evaluated.  Total number of lesions treated: 1          ASSESSMENT:     Encounter Diagnoses   Name Primary?     Neoplasm of uncertain behavior of skin Yes     Seborrheic keratosis      Solar lentiginosis      Melanocytic nevi of trunk      History of squamous cell carcinoma in situ of skin      History of basal cell carcinoma      Actinic keratosis      Need for prophylactic vaccination and inoculation against influenza          PLAN:   Patient Instructions   FUTURE APPOINTMENTS  Follow up per pathology report. You will be notified, generally via letter or MyChart, in approximately 10 days. If there is anything we need to discuss or further treatment needed, I will call you to discuss it.    Vaseline and bandage for 5-7 days.     Can shower normally.     Skin exam in 6 months.         WOUND CARE INSTRUCTIONS  1. Wash hands before every dressing change.  2. Change the dressing after 24 hours and once daily, or earlier if it becomes saturated.  3. Wash the wound area with a mild soap, then rinse.  4. Gently pat dry with a sterile gauze or Q-tip.  5. Using a Q-tip, apply Vaseline or Aquaphor only over entire wound. DO NOT use Neosporin - as many people react to neomycin.  6. Finally, cover with a bandage or sterile non-stick gauze with micropore paper tape.  7. Repeat once daily until wound has healed.      Soap, water and shampoo will not hurt this area.    Do not go swimming or take baths, but showering is encouraged.    Limit use of the area where the procedure was done for a few days to allow for optimal healing.    Signs of Infection:  Infection can occur in any area where skin has been disrupted. If you notice persistent redness, swelling, colored drainage,  "increasing pain, fever or other signs of infection, please call us at: [phone] and ask to have me or my colleague paged. We will call you back to discuss.    If you experience bleeding:  Wash hands and hold firm pressure on the area for 10 minutes without checking to see if the bleeding has stopped. \"Checking\" pulls off the protective wound clot and restarts the bleeding all over again. Re-apply pressure for 10 minutes if necessary to stop bleeding.  Use additional sterile gauze and tape to maintain pressure once bleeding has stopped.  If bleeding continues, then call back to clinic at [phone].    PATIENT INFORMATION : WOUNDS  During the healing process you will notice a number of changes.     All wounds normally drain.  The larger the wound the more drainage there will be.  After 7-10 days, you will notice the wound beginning to shrink and new skin will begin to grow.  The wound is healed when you can see that skin has formed over the entire area.  A healed wound has a healthy, shiny look to the surface and is red to dark pink in color to normalize.  Wounds may take approximately 4-6 weeks to heal.  Larger wounds may take 6-8 weeks. After the wound is healed you may discontinue dressing changes.    All wounds develop a small halo of redness surrounding the wound which means that healing is occurring. Severe itching with extensive redness usually indicates sensitivity to the ointment or bandage tape used to dress the wound.  You should call our office if severe itching with extensive redness develops.    Swelling  and/or discoloration around your surgical site is common, particularly when performed around the eye.  You may experience a sensation of tightness as your wound heals. This is normal and will gradually subside.  Your healed wound may be sensitive to temperature changes. This sensitivity improves with time, but if you re having a lot of discomfort, try to avoid temperature extremes.  Patients " frequently experience itching after their wound appears to have healed because of the continue healing under the skin.  Plain Vaseline will help relieve the itching.        The patient was counseled about sunscreens and sun avoidance. The patient was counseled to check the skin regularly and report any lesion that is new, changing, itching, scabbing, bleeding or otherwise bothersome. The patient was discharged ambulatory and in stable condition.        TT: 25 minutes.  CT: 15 minutes.        Again, thank you for allowing me to participate in the care of your patient.        Sincerely,        Garima Sultana MD

## 2020-09-29 NOTE — PATIENT INSTRUCTIONS
"FUTURE APPOINTMENTS  Follow up per pathology report. You will be notified, generally via letter or MyChart, in approximately 10 days. If there is anything we need to discuss or further treatment needed, I will call you to discuss it.    Vaseline and bandage for 5-7 days.     Can shower normally.     Skin exam in 6 months.         WOUND CARE INSTRUCTIONS  1. Wash hands before every dressing change.  2. Change the dressing after 24 hours and once daily, or earlier if it becomes saturated.  3. Wash the wound area with a mild soap, then rinse.  4. Gently pat dry with a sterile gauze or Q-tip.  5. Using a Q-tip, apply Vaseline or Aquaphor only over entire wound. DO NOT use Neosporin - as many people react to neomycin.  6. Finally, cover with a bandage or sterile non-stick gauze with micropore paper tape.  7. Repeat once daily until wound has healed.      Soap, water and shampoo will not hurt this area.    Do not go swimming or take baths, but showering is encouraged.    Limit use of the area where the procedure was done for a few days to allow for optimal healing.    Signs of Infection:  Infection can occur in any area where skin has been disrupted. If you notice persistent redness, swelling, colored drainage, increasing pain, fever or other signs of infection, please call us at: [phone] and ask to have me or my colleague paged. We will call you back to discuss.    If you experience bleeding:  Wash hands and hold firm pressure on the area for 10 minutes without checking to see if the bleeding has stopped. \"Checking\" pulls off the protective wound clot and restarts the bleeding all over again. Re-apply pressure for 10 minutes if necessary to stop bleeding.  Use additional sterile gauze and tape to maintain pressure once bleeding has stopped.  If bleeding continues, then call back to clinic at [phone].    PATIENT INFORMATION : WOUNDS  During the healing process you will notice a number of changes.     All " wounds normally drain.  The larger the wound the more drainage there will be.  After 7-10 days, you will notice the wound beginning to shrink and new skin will begin to grow.  The wound is healed when you can see that skin has formed over the entire area.  A healed wound has a healthy, shiny look to the surface and is red to dark pink in color to normalize.  Wounds may take approximately 4-6 weeks to heal.  Larger wounds may take 6-8 weeks. After the wound is healed you may discontinue dressing changes.    All wounds develop a small halo of redness surrounding the wound which means that healing is occurring. Severe itching with extensive redness usually indicates sensitivity to the ointment or bandage tape used to dress the wound.  You should call our office if severe itching with extensive redness develops.    Swelling  and/or discoloration around your surgical site is common, particularly when performed around the eye.  You may experience a sensation of tightness as your wound heals. This is normal and will gradually subside.  Your healed wound may be sensitive to temperature changes. This sensitivity improves with time, but if you re having a lot of discomfort, try to avoid temperature extremes.  Patients frequently experience itching after their wound appears to have healed because of the continue healing under the skin.  Plain Vaseline will help relieve the itching.

## 2020-10-01 LAB — COPATH REPORT: NORMAL

## 2020-10-08 ENCOUNTER — TELEPHONE (OUTPATIENT)
Dept: FAMILY MEDICINE | Facility: CLINIC | Age: 65
End: 2020-10-08

## 2020-10-08 NOTE — TELEPHONE ENCOUNTER
----- Message from Garima Sultana MD sent at 10/8/2020  1:52 PM CDT -----  Please call :     Superficial basal cell carcinoma         Can treat with ED&C , topical 5 fluorouracil or excision.               Topical 5 fluorouracil would be two times per day for 4-6 weeks potential 25 % chance of recurrence at 5 years              ED&C is curetting the base and using cautery, total of 3 cycles at the same appointment . 5 year recurrence is variable 5-25 % recurrence              Finally the excision ( which she is familiar with )     Thank you,   Garima Sultana M.D.

## 2020-10-08 NOTE — TELEPHONE ENCOUNTER
Left message on answering machine for patient to call back.    Almaz SYLVESTERRN  [facility]  [phone]

## 2020-10-09 NOTE — TELEPHONE ENCOUNTER
Left message on answering machine for patient to call back.    Almaz SYLVESTERRN  [facility]  [phone]

## 2020-10-09 NOTE — TELEPHONE ENCOUNTER
Patient called back- discussed treatment options below with patient she actually has 5FU at home from the last time she needed it. She is comfortable starting it again bid to AA for 4-6 weeks. Advised 4 week follow up with provider and to call if excessive irritation   Patient verbalized understanding    Almaz GALERN BSN  [facility]  [phone]

## 2020-10-26 ASSESSMENT — ENCOUNTER SYMPTOMS
HEMATOCHEZIA: 0
HEMATURIA: 0
JOINT SWELLING: 0
HEADACHES: 0
HEARTBURN: 0
FREQUENCY: 0
WEAKNESS: 0
CONSTIPATION: 0
EYE PAIN: 0
ABDOMINAL PAIN: 0
DIARRHEA: 0
ARTHRALGIAS: 0
MYALGIAS: 0
FEVER: 0
NAUSEA: 0
SHORTNESS OF BREATH: 0
CHILLS: 0
NERVOUS/ANXIOUS: 0
PALPITATIONS: 0
DYSURIA: 0
BREAST MASS: 0
PARESTHESIAS: 0
COUGH: 0
DIZZINESS: 0
SORE THROAT: 0

## 2020-10-26 ASSESSMENT — ACTIVITIES OF DAILY LIVING (ADL): CURRENT_FUNCTION: NO ASSISTANCE NEEDED

## 2020-10-27 ENCOUNTER — OFFICE VISIT (OUTPATIENT)
Dept: FAMILY MEDICINE | Facility: CLINIC | Age: 65
End: 2020-10-27
Payer: COMMERCIAL

## 2020-10-27 ENCOUNTER — MYC MEDICAL ADVICE (OUTPATIENT)
Dept: FAMILY MEDICINE | Facility: CLINIC | Age: 65
End: 2020-10-27

## 2020-10-27 VITALS
HEART RATE: 105 BPM | HEIGHT: 64 IN | SYSTOLIC BLOOD PRESSURE: 126 MMHG | OXYGEN SATURATION: 95 % | TEMPERATURE: 98.1 F | BODY MASS INDEX: 29.37 KG/M2 | RESPIRATION RATE: 14 BRPM | WEIGHT: 172 LBS | DIASTOLIC BLOOD PRESSURE: 80 MMHG

## 2020-10-27 DIAGNOSIS — Z00.00 ENCOUNTER FOR MEDICARE ANNUAL WELLNESS EXAM: Primary | ICD-10-CM

## 2020-10-27 DIAGNOSIS — Z86.007 HISTORY OF SQUAMOUS CELL CARCINOMA IN SITU OF SKIN: ICD-10-CM

## 2020-10-27 DIAGNOSIS — E03.9 ACQUIRED HYPOTHYROIDISM: ICD-10-CM

## 2020-10-27 DIAGNOSIS — F41.8 SITUATIONAL ANXIETY: ICD-10-CM

## 2020-10-27 DIAGNOSIS — Z12.31 VISIT FOR SCREENING MAMMOGRAM: ICD-10-CM

## 2020-10-27 DIAGNOSIS — G43.809 OTHER MIGRAINE WITHOUT STATUS MIGRAINOSUS, NOT INTRACTABLE: ICD-10-CM

## 2020-10-27 DIAGNOSIS — E78.5 HYPERLIPIDEMIA WITH TARGET LDL LESS THAN 130: ICD-10-CM

## 2020-10-27 DIAGNOSIS — Z71.89 ADVANCED DIRECTIVES, COUNSELING/DISCUSSION: ICD-10-CM

## 2020-10-27 LAB
CHOLEST SERPL-MCNC: 156 MG/DL
GLUCOSE SERPL-MCNC: 88 MG/DL (ref 70–99)
HDLC SERPL-MCNC: 73 MG/DL
LDLC SERPL CALC-MCNC: 66 MG/DL
NONHDLC SERPL-MCNC: 83 MG/DL
TRIGL SERPL-MCNC: 85 MG/DL
TSH SERPL DL<=0.005 MIU/L-ACNC: 1.7 MU/L (ref 0.4–4)

## 2020-10-27 PROCEDURE — 99213 OFFICE O/P EST LOW 20 MIN: CPT | Mod: 25 | Performed by: FAMILY MEDICINE

## 2020-10-27 PROCEDURE — 90471 IMMUNIZATION ADMIN: CPT | Performed by: FAMILY MEDICINE

## 2020-10-27 PROCEDURE — 82947 ASSAY GLUCOSE BLOOD QUANT: CPT | Performed by: FAMILY MEDICINE

## 2020-10-27 PROCEDURE — 36415 COLL VENOUS BLD VENIPUNCTURE: CPT | Performed by: FAMILY MEDICINE

## 2020-10-27 PROCEDURE — 80061 LIPID PANEL: CPT | Performed by: FAMILY MEDICINE

## 2020-10-27 PROCEDURE — 84443 ASSAY THYROID STIM HORMONE: CPT | Performed by: FAMILY MEDICINE

## 2020-10-27 PROCEDURE — 90732 PPSV23 VACC 2 YRS+ SUBQ/IM: CPT | Performed by: FAMILY MEDICINE

## 2020-10-27 PROCEDURE — 99397 PER PM REEVAL EST PAT 65+ YR: CPT | Mod: 25 | Performed by: FAMILY MEDICINE

## 2020-10-27 RX ORDER — ALPRAZOLAM 0.25 MG
.25-.5 TABLET ORAL 3 TIMES DAILY PRN
Qty: 12 TABLET | Refills: 1 | Status: CANCELLED | OUTPATIENT
Start: 2020-10-27

## 2020-10-27 RX ORDER — LEVOTHYROXINE SODIUM 112 MCG
112 TABLET ORAL DAILY
Qty: 90 TABLET | Refills: 3 | Status: SHIPPED | OUTPATIENT
Start: 2020-10-27 | End: 2020-10-28

## 2020-10-27 RX ORDER — ALPRAZOLAM 0.25 MG
.25-.5 TABLET ORAL 3 TIMES DAILY PRN
Qty: 12 TABLET | Refills: 0 | Status: SHIPPED | OUTPATIENT
Start: 2020-10-27 | End: 2020-11-03

## 2020-10-27 RX ORDER — SIMVASTATIN 40 MG
TABLET ORAL
Qty: 90 TABLET | Refills: 3 | Status: SHIPPED | OUTPATIENT
Start: 2020-10-27 | End: 2020-11-03

## 2020-10-27 ASSESSMENT — MIFFLIN-ST. JEOR: SCORE: 1310.19

## 2020-10-27 ASSESSMENT — ACTIVITIES OF DAILY LIVING (ADL): CURRENT_FUNCTION: NO ASSISTANCE NEEDED

## 2020-10-27 NOTE — PATIENT INSTRUCTIONS
[facility]    If you have any questions regarding to your visit please contact your care team:       Team Red:   Clinic Hours Telephone Number   [address] 7am-7pm  Monday - Thursday   7am-5pm  Fridays  [phone]  (Appointment scheduling available 24/7)    Questions about your recent visit?   Team Line  [phone]   Urgent Care - [place] and [facility] - 11am-9pm Monday-Friday Saturday-Sunday- 9am-5pm   [place] - 5pm-9pm Monday-Friday Saturday-Sunday- 9am-5pm  [phone] - [place]  [phone] - [place]       What options do I have for a visit other than an office visit? We offer electronic visits (e-visits) and telephone visits, when medically appropriate.  Please check with your medical insurance to see if these types of visits are covered, as you will be responsible for any charges that are not paid by your insurance.      You can use [employer] (secure electronic communication) to access to your chart, send your primary care provider a message, or make an appointment. Ask a team member how to get started.     For a price quote for your services, please call our Consumer Price Line at [phone] or our Imaging Cost estimation line at [phone] (for imaging tests).    Patient Education   Personalized Prevention Plan  You are due for the preventive services outlined below.  Your care team is available to assist you in scheduling these services.  If you have already completed any of these items, please share that information with your care team to update in your medical record.  Health Maintenance Due   Topic Date Due     Osteoporosis Screening  1955     Zoster (Shingles) Vaccine (1 of 2) 01/03/2005     Mammogram  12/08/2019     FALL RISK ASSESSMENT  01/03/2020     Pneumococcal Vaccine (1 of 1 - PPSV23) 01/03/2020     Thyroid Function Lab  10/01/2020

## 2020-10-27 NOTE — PROGRESS NOTES
"SUBJECTIVE:   Jacqueline Banegas is a 65 year old female who presents for Preventive Visit.  She is Moving to [place]    Patient has been advised of split billing requirements and indicates understanding: Yes   Are you in the first 12 months of your Medicare coverage?  No    Healthy Habits:     In general, how would you rate your overall health?  Excellent    Frequency of exercise:  4-5 days/week    Duration of exercise:  45-60 minutes    Do you usually eat at least 4 servings of fruit and vegetables a day, include whole grains    & fiber and avoid regularly eating high fat or \"junk\" foods?  Yes    Taking medications regularly:  No    Medication side effects:  Not applicable    Ability to successfully perform activities of daily living:  No assistance needed    Home Safety:  No safety concerns identified    Hearing Impairment:  No hearing concerns    In the past 6 months, have you been bothered by leaking of urine?  No    In general, how would you rate your overall mental or emotional health?  Good      PHQ-2 Total Score: 0    Do you feel safe in your environment? Yes    Have you ever done Advance Care Planning? (For example, a Health Directive, POLST, or a discussion with a medical provider or your loved ones about your wishes): No, advance care planning information given to patient to review.  Advanced care planning was discussed at today's visit.      Fall risk  Fallen 2 or more times in the past year?: No  Any fall with injury in the past year?: No    Cognitive Screening   1) Repeat 3 items (Leader, Season, Table)    2) Clock draw: NORMAL  3) 3 item recall: Recalls 3 objects  Results: 3 items recalled: COGNITIVE IMPAIRMENT LESS LIKELY    Mini-CogTM Copyright [employer]. Licensed by the author for use in [facility]; reprinted with permission (selwyn@.[facility]). All rights reserved.      Do you have sleep apnea, excessive snoring or daytime drowsiness?: no    Reviewed and updated as needed this visit by clinical " staff  Tobacco                Reviewed and updated as needed this visit by Provider                Social History     Tobacco Use     Smoking status: Former Smoker     Packs/day: 0.25     Years: 10.00     Pack years: 2.50     Types: Cigarettes     Quit date: 1990     Years since quittin.8     Smokeless tobacco: Never Used   Substance Use Topics     Alcohol use: No     If you drink alcohol do you typically have >3 drinks per day or >7 drinks per week? No    Alcohol Use 10/27/2020   Prescreen: >3 drinks/day or >7 drinks/week? -   Prescreen: >3 drinks/day or >7 drinks/week? No   No flowsheet data found.        Hyperlipidemia Follow-Up      Are you regularly taking any medication or supplement to lower your cholesterol?   Yes- statin    Are you having muscle aches or other side effects that you think could be caused by your cholesterol lowering medication?  No    Migraine     Since your last clinic visit, how have your headaches changed?  Improved    How often are you getting headaches or migraines? q 2 months     Are you able to do normal daily activities when you have a migraine? Yes    Are you taking rescue/relief medications? (Select all that apply) sumatriptan (Imitrex)    How helpful is your rescue/relief medication?  I get total relief    Are you taking any medications to prevent migraines? (Select all that apply)  No    In the past 4 weeks, how often have you gone to urgent care or the emergency room because of your headaches?  0    Doing well on Thyroid medicines  Current providers sharing in care for this patient include:   Patient Care Team:  Venessa Carter MD as PCP - General  Venessa Carter MD as Assigned PCP    The following health maintenance items are reviewed in Epic and correct as of today:  Health Maintenance   Topic Date Due     DEXA  1955     ZOSTER IMMUNIZATION (1 of 2) 2005     MAMMO SCREENING  2019     FALL RISK ASSESSMENT  2020      Pneumococcal Vaccine: 65+ Years (1 of 1 - PPSV23) 2020     TSH W/FREE T4 REFLEX  10/01/2020     LIPID  2020     MEDICARE ANNUAL WELLNESS VISIT  10/27/2021     DTAP/TDAP/TD IMMUNIZATION (4 - Td) 2022     COLORECTAL CANCER SCREENING  2022     ADVANCE CARE PLANNING  10/27/2025     HEPATITIS C SCREENING  Completed     HIV SCREENING  Completed     PHQ-2  Completed     INFLUENZA VACCINE  Completed     Pneumococcal Vaccine: Pediatrics (0 to 5 Years) and At-Risk Patients (6 to 64 Years)  Aged Out     IPV IMMUNIZATION  Aged Out     MENINGITIS IMMUNIZATION  Aged Out     HEPATITIS B IMMUNIZATION  Aged Out     Lab work is in process  Labs reviewed in EPIC  BP Readings from Last 3 Encounters:   10/27/20 126/80   20 132/84   20 (!) 140/90    Wt Readings from Last 3 Encounters:   10/27/20 78 kg (172 lb)   20 77.6 kg (171 lb)   19 76.7 kg (169 lb)                  Patient Active Problem List   Diagnosis     Hyperlipidemia with target LDL less than 130     Migraines     History of basal cell carcinoma     PFO (patent foramen ovale)     Advanced directives, counseling/discussion     Acquired hypothyroidism     Right knee DJD     History of squamous cell carcinoma in situ of skin     Situational anxiety     Ventricular hypertrophy     Hypertension goal BP (blood pressure) < 140/90     Past Surgical History:   Procedure Laterality Date     APPENDECTOMY  1967     BIOPSY      Skin Ca.     C  DELIVERY ONLY  ,,      C LAMINECTOMY,>2 SGMT,CERVICAL       C SHOULDER SURG PROC UNLISTED      RT     COLONOSCOPY  2011    Procedure:COLONOSCOPY; Colonoscopy, screening; Surgeon:AUDREY SPENCER; Location:[facility] OR     COLONOSCOPY WITH CO2 INSUFFLATION N/A 2017    Procedure: COLONOSCOPY WITH CO2 INSUFFLATION;  COLON-SCREENING / BINSFELD;  Surgeon: Trevor Raygoza MD;  Location: [facility] OR       Social History     Tobacco Use     Smoking status: Former Smoker      Packs/day: 0.25     Years: 10.00     Pack years: 2.50     Types: Cigarettes     Quit date: 1990     Years since quittin.8     Smokeless tobacco: Never Used   Substance Use Topics     Alcohol use: No     Family History   Problem Relation Age of Onset     Cancer - colorectal Mother 65     Heart Disease Mother         IHSS     Hypertension Mother      Cancer Father         skin     Blood Disease Father         pernicious anemia     Hypertension Father      Hyperlipidemia Father      Asthma Daughter      Skin Cancer Sister      Cancer Paternal Aunt         uterine     Diabetes Other         cousin, type I     Diabetes Other          Current Outpatient Medications   Medication Sig Dispense Refill     ALPRAZolam (XANAX) 0.25 MG tablet Take 1-2 tablets (0.25-0.5 mg) by mouth 3 times daily as needed for anxiety (with airplane travel) For flying 12 tablet 1     aspirin 81 MG tablet Take 81 mg by mouth daily Reported on 2017       simvastatin (ZOCOR) 40 MG tablet Take 1 tablet by mouth daily 90 tablet 3     SUMAtriptan (IMITREX) 50 MG tablet Take 1-2 tablets ( mg) by mouth at onset of headache for migraine May repeat in 2 hours if needed: max 200 mg/day 9 tablet 11     SYNTHROID 112 MCG tablet Take 1 tablet (112 mcg) by mouth daily 90 tablet 3     Allergies   Allergen Reactions     Erythromycin Hives     palpitations     Penicillins Rash     palpitations         Review of Systems  CONSTITUTIONAL: NEGATIVE for fever, chills, change in weight  INTEGUMENTARY/SKIN: NEGATIVE for worrisome rashes, moles or lesions  EYES: NEGATIVE for vision changes or irritation  ENT/MOUTH: NEGATIVE for ear, mouth and throat problems  RESP: NEGATIVE for significant cough or SOB  BREAST: NEGATIVE for masses, tenderness or discharge  CV: NEGATIVE for chest pain, palpitations or peripheral edema  GI: NEGATIVE for nausea, abdominal pain, heartburn, or change in bowel habits  : NEGATIVE for frequency, dysuria, or  "hematuria  MUSCULOSKELETAL: NEGATIVE for significant arthralgias or myalgia  NEURO: NEGATIVE for weakness, dizziness or paresthesias  ENDOCRINE: NEGATIVE for temperature intolerance, skin/hair changes  HEME: NEGATIVE for bleeding problems  PSYCHIATRIC: NEGATIVE for changes in mood or affect    OBJECTIVE:   /80   Pulse 105   Temp 98.1  F (36.7  C)   Resp 14   Ht 1.626 m (5' 4\")   Wt 78 kg (172 lb)   SpO2 95%   BMI 29.52 kg/m   Estimated body mass index is 29.52 kg/m  as calculated from the following:    Height as of this encounter: 1.626 m (5' 4\").    Weight as of this encounter: 78 kg (172 lb).  Physical Exam  GENERAL APPEARANCE: healthy, alert and no distress  EYES: Eyes grossly normal to inspection, PERRL and conjunctivae and sclerae normal  HENT: ear canals and TM's normal, nose and mouth without ulcers or lesions, oropharynx clear and oral mucous membranes moist  NECK: no adenopathy, no asymmetry, masses, or scars and thyroid normal to palpation  RESP: lungs clear to auscultation - no rales, rhonchi or wheezes  BREAST: normal without masses, tenderness or nipple discharge and no palpable axillary masses or adenopathy  CV: regular rate and rhythm, normal S1 S2, no S3 or S4, no murmur, click or rub, no peripheral edema and peripheral pulses strong  ABDOMEN: soft, nontender, no hepatosplenomegaly, no masses and bowel sounds normal  MS: no musculoskeletal defects are noted and gait is age appropriate without ataxia  SKIN: no suspicious lesions or rashes  NEURO: Normal strength and tone, sensory exam grossly normal, mentation intact and speech normal  PSYCH: mentation appears normal and affect normal/bright    Diagnostic Test Results:  Labs reviewed in Epic  Pending     ASSESSMENT / PLAN:   1. Encounter for Medicare annual wellness exam    - Lipid panel reflex to direct LDL Fasting  - Glucose    2. Situational anxiety  Refilled  Pt needs it when she Flies  - ALPRAZolam (XANAX) 0.25 MG tablet; Take 1-2 " "tablets (0.25-0.5 mg) by mouth 3 times daily as needed for anxiety (with airplane travel) For flying  Dispense: 12 tablet; Refill: 0    3. Acquired hypothyroidism  Stable   - TSH with free T4 reflex  - SYNTHROID 112 MCG tablet; Take 1 tablet (112 mcg) by mouth daily  Dispense: 90 tablet; Refill: 3        5. History of squamous cell carcinoma in situ of skin      6. Other migraine without status migrainosus, not intractable  better    7. Advanced directives, counseling/discussion  Discussed     8. Visit for screening mammogram  Advised   - MA Screening Digital Bilateral; Future    9. Hyperlipidemia with target LDL less than 130  Labs pend   - simvastatin (ZOCOR) 40 MG tablet; Take 1 tablet by mouth daily  Dispense: 90 tablet; Refill: 3    Patient has been advised of split billing requirements and indicates understanding: Yes  COUNSELING:  Reviewed preventive health counseling, as reflected in patient instructions       Regular exercise       Healthy diet/nutrition       Vision screening       Hearing screening       Dental care       Bladder control       Fall risk prevention       Immunizations    Vaccinated for: Pneumococcal             Osteoporosis Prevention/Bone Health       Advanced Planning     Estimated body mass index is 29.52 kg/m  as calculated from the following:    Height as of this encounter: 1.626 m (5' 4\").    Weight as of this encounter: 78 kg (172 lb).        She reports that she quit smoking about 30 years ago. Her smoking use included cigarettes. She has a 2.50 pack-year smoking history. She has never used smokeless tobacco.      Appropriate preventive services were discussed with this patient, including applicable screening as appropriate for cardiovascular disease, diabetes, osteopenia/osteoporosis, and glaucoma.  As appropriate for age/gender, discussed screening for colorectal cancer, prostate cancer, breast cancer, and cervical cancer. Checklist reviewing preventive services available has " been given to the patient.    Reviewed patients plan of care and provided an AVS. The Intermediate Care Plan ( asthma action plan, low back pain action plan, and migraine action plan) for Jacqueline meets the Care Plan requirement. This Care Plan has been established and reviewed with the Patient.    Counseling Resources:  ATP IV Guidelines  Pooled Cohorts Equation Calculator  Breast Cancer Risk Calculator  Breast Cancer: Medication to Reduce Risk  FRAX Risk Assessment  ICSI Preventive Guidelines  Dietary Guidelines for Americans, 2010  USDA's MyPlate  ASA Prophylaxis  Lung CA Screening    Miryam Nunes MD  [facility]    Identified Health Risks:

## 2020-10-28 RX ORDER — LEVOTHYROXINE SODIUM 112 MCG
112 TABLET ORAL DAILY
Qty: 90 TABLET | Refills: 3 | Status: SHIPPED | OUTPATIENT
Start: 2020-10-28

## 2020-11-03 RX ORDER — SIMVASTATIN 40 MG
TABLET ORAL
Qty: 90 TABLET | Refills: 3 | Status: SHIPPED | OUTPATIENT
Start: 2020-11-03

## 2020-11-03 RX ORDER — ALPRAZOLAM 0.25 MG
.25-.5 TABLET ORAL 3 TIMES DAILY PRN
Qty: 12 TABLET | Refills: 0 | Status: SHIPPED | OUTPATIENT
Start: 2020-11-03

## 2020-11-03 NOTE — TELEPHONE ENCOUNTER
Signed Prescriptions:                        Disp   Refills    SYNTHROID 112 MCG tablet                   90 tab*3        Sig: Take 1 tablet (112 mcg) by mouth daily  Authorizing Provider: FER DESIR  Ordering User: GONZALO PARKINSON    simvastatin (ZOCOR) 40 MG tablet           90 tab*3        Sig: Take 1 tablet by mouth daily  Authorizing Provider: ARTURO GERMAIN    ALPRAZolam (XANAX) 0.25 MG tablet          12 tab*0        Sig: Take 1-2 tablets (0.25-0.5 mg) by mouth 3 times daily           as needed for anxiety (with airplane travel) For           flying  Authorizing Provider: ARTURO GERMAIN

## 2021-10-23 ENCOUNTER — HEALTH MAINTENANCE LETTER (OUTPATIENT)
Age: 66
End: 2021-10-23

## 2021-12-18 ENCOUNTER — HEALTH MAINTENANCE LETTER (OUTPATIENT)
Age: 66
End: 2021-12-18

## 2022-04-08 ENCOUNTER — TELEPHONE (OUTPATIENT)
Dept: FAMILY MEDICINE | Facility: CLINIC | Age: 67
End: 2022-04-08
Payer: COMMERCIAL

## 2022-04-08 NOTE — TELEPHONE ENCOUNTER
Patient Quality Outreach    Patient is due for the following:   Breast Cancer Screening - Mammogram, AWV, DEXA, lipid, TSH    NEXT STEPS:   Chart routed to abstraction.      Type of outreach:    Chart review performed, no outreach needed.      Mammogram done by this group [facility] on this date: 11/5/21 and DEXA done by this group [facility] on this date: 9/29/21 and LIPID and TSH done by [facility] on this date: 9/22/21    Questions for provider review:    None     Christina Hobbs MA  Chart routed to abstract.

## 2022-04-09 ENCOUNTER — HEALTH MAINTENANCE LETTER (OUTPATIENT)
Age: 67
End: 2022-04-09

## 2022-09-14 ENCOUNTER — TELEPHONE (OUTPATIENT)
Dept: FAMILY MEDICINE | Facility: CLINIC | Age: 67
End: 2022-09-14

## 2022-09-14 NOTE — TELEPHONE ENCOUNTER
Patient Quality Outreach    Patient is due for the following:   Breast Cancer Screening - Mammogram  Physical Annual Wellness Visit      Topic Date Due     Zoster (Shingles) Vaccine (1 of 2) Never done     COVID-19 Vaccine (3 - Booster for Moderna series) 09/02/2021     Pneumococcal Vaccine (2 - PCV) 10/27/2021     Flu Vaccine (1) 09/01/2022     Diptheria Tetanus Pertussis (DTAP/TDAP/TD) Vaccine (4 - Td or Tdap) 08/22/2022       Next Steps:   Schedule a Annual Wellness Visit    Type of outreach:    Sent [employer] message.      Questions for provider review:    None     Patricia Sanders [facility]  Chart routed to Care Team.

## 2022-09-14 NOTE — LETTER
September 21, 2022      Jacqueline Banegas  [address]  [place] [address]      Your healthcare team cares about your health. To provide you with the best care,   we have reviewed your chart and based on our findings, we see that you are due to:     - BREAST CANCER SCREENING:  Schedule Annual Mammogram. Breast center scheduling number - [phone] or schedule in MyChart (self referall)  - ANNUAL WELLNESS FOLLOW UP:   Schedule an Annual Medicare Wellness Exam. This can be done by in person visit or virtual video visit.     If you have already completed these items, please contact the clinic via phone or   [facility] so your care team can review and update your records. Thank you for   choosing [facility] for your healthcare needs. For any questions,   concerns, or to schedule an appointment please contact the clinic.       Healthy Regards,      Your [facility] Care Team

## 2022-09-21 NOTE — TELEPHONE ENCOUNTER
Patient Quality Outreach    Patient is due for the following:   Breast Cancer Screening - Mammogram  Physical Annual Wellness Visit    Next Steps:   Schedule a Annual Wellness Visit    Type of outreach:    Sent letter.    Next Steps:  Reach out within 90 days via Letter.    Max number of attempts reached: Yes. Will try again in 90 days if patient still on fail list.    Questions for provider review:    None     Patricia Sanders [facility]  Chart routed to Care Team.

## 2022-10-09 ENCOUNTER — HEALTH MAINTENANCE LETTER (OUTPATIENT)
Age: 67
End: 2022-10-09

## 2024-05-25 ENCOUNTER — HEALTH MAINTENANCE LETTER (OUTPATIENT)
Age: 69
End: 2024-05-25

## (undated) DEVICE — SOL WATER IRRIG 1000ML BOTTLE 07139-09

## (undated) RX ORDER — FENTANYL CITRATE 50 UG/ML
INJECTION, SOLUTION INTRAMUSCULAR; INTRAVENOUS
Status: DISPENSED
Start: 2017-12-05